# Patient Record
Sex: FEMALE | Race: WHITE | NOT HISPANIC OR LATINO | Employment: FULL TIME | URBAN - METROPOLITAN AREA
[De-identification: names, ages, dates, MRNs, and addresses within clinical notes are randomized per-mention and may not be internally consistent; named-entity substitution may affect disease eponyms.]

---

## 2017-01-16 ENCOUNTER — OFFICE VISIT (OUTPATIENT)
Dept: DERMATOLOGY | Facility: CLINIC | Age: 57
End: 2017-01-16
Payer: COMMERCIAL

## 2017-01-16 DIAGNOSIS — D22.9 MULTIPLE BENIGN NEVI: ICD-10-CM

## 2017-01-16 DIAGNOSIS — Z86.19 HISTORY OF ONYCHOMYCOSIS: ICD-10-CM

## 2017-01-16 DIAGNOSIS — L21.9 SEBORRHEIC DERMATITIS: ICD-10-CM

## 2017-01-16 DIAGNOSIS — Z12.83 SKIN CANCER SCREENING: Primary | ICD-10-CM

## 2017-01-16 PROCEDURE — 1159F MED LIST DOCD IN RCRD: CPT | Mod: S$GLB,,, | Performed by: DERMATOLOGY

## 2017-01-16 PROCEDURE — 99999 PR PBB SHADOW E&M-EST. PATIENT-LVL I: CPT | Mod: PBBFAC,,, | Performed by: DERMATOLOGY

## 2017-01-16 PROCEDURE — 99214 OFFICE O/P EST MOD 30 MIN: CPT | Mod: S$GLB,,, | Performed by: DERMATOLOGY

## 2017-01-16 RX ORDER — KETOCONAZOLE 20 MG/ML
SHAMPOO, SUSPENSION TOPICAL
Qty: 120 ML | Refills: 5 | Status: SHIPPED | OUTPATIENT
Start: 2017-01-16 | End: 2019-02-01

## 2017-01-16 NOTE — PROGRESS NOTES
Subjective:       Patient ID:  Brandie Richmond is a 56 y.o. female who presents for No chief complaint on file.    HPI Comments: Pt here for skin check. Last seen on 8-4-2016    Dry scaling skin on scalp for a few weeks since starting Prednisone, using [place] shampoo    Recent Pathology  FINAL PATHOLOGIC DIAGNOSIS   Skin, left neck, shave biopsy:   - ACROCHORDON (FIBROEPITHELIAL POLYP).   MICROSCOPIC DESCRIPTION: Sections show a fibrovascular core covered by squamous epithelium of the   surface epidermal type.   Diagnosed by: Chandrika Johnson M.D.   (Electronically Signed: 2016-08-08 13:40:08)   Gross Description   Clinic # [id number]   Received in formalin, labeled left neck, is a 0.4 cm shave biopsy nonpigmented skin which has a 0.3 x 0.3 cm   pedunculated skin papule. Specimen is bisected and entirely submitted in a single cassette.   Marco Antonio Atkinson      Onychomycosis  negative fungal culture in past  Dc'dtavaborole (KERYDIN) 5 % Marina July 2016 - toe nails have improved      No phx skin ca  Father had skin ca - type unknown                   Review of Systems   Skin: Positive for activity-related sunscreen use. Negative for daily sunscreen use and recent sunburn.        Objective:    Physical Exam   Constitutional: She appears well-developed and well-nourished. No distress.   HENT:   Head:       Mouth/Throat: Lips normal.    Eyes: Lids are normal.  No conjunctival no injection.   Cardiovascular: There is no local extremity swelling and no dependent edema.     Neurological: She is alert and oriented to person, place, and time. She is not disoriented.   Psychiatric: She has a normal mood and affect.   Skin:   Areas Examined (abnormalities noted in diagram):   Scalp / Hair Palpated and Inspected  Head / Face Inspection Performed  Neck Inspection Performed  Chest / Axilla Inspection Performed  Abdomen Inspection Performed  Genitals / Buttocks / Groin Inspection Performed  Back Inspection Performed  RUE Inspected  ROBERT  Inspection Performed  RLE Inspected  LLE Inspection Performed  Nails and Digits Inspection Performed              Diagram Legend     Erythematous scaling macule/papule c/w actinic keratosis       Vascular papule c/w angioma      Pigmented verrucoid papule/plaque c/w seborrheic keratosis      Yellow umbilicated papule c/w sebaceous hyperplasia      Irregularly shaped tan macule c/w lentigo     1-2 mm smooth white papules consistent with Milia      Movable subcutaneous cyst with punctum c/w epidermal inclusion cyst      Subcutaneous movable cyst c/w pilar cyst      Firm pink to brown papule c/w dermatofibroma      Pedunculated fleshy papule(s) c/w skin tag(s)      Evenly pigmented macule c/w junctional nevus     Mildly variegated pigmented, slightly irregular-bordered macule c/w mildly atypical nevus      Flesh colored to evenly pigmented papule c/w intradermal nevus       Pink pearly papule/plaque c/w basal cell carcinoma      Erythematous hyperkeratotic cursted plaque c/w SCC      Surgical scar with no sign of skin cancer recurrence      Open and closed comedones      Inflammatory papules and pustules      Verrucoid papule consistent consistent with wart     Erythematous eczematous patches and plaques     Dystrophic onycholytic nail with subungual debris c/w onychomycosis     Umbilicated papule    Erythematous-base heme-crusted tan verrucoid plaque consistent with inflamed seborrheic keratosis     Erythematous Silvery Scaling Plaque c/w Psoriasis     See annotation      Assessment / Plan:        Skin cancer screening    Total body skin examination performed today including at least 12 points as noted in physical examination. No lesions suspicious for malignancy noted.      Seborrheic dermatitis  -     ketoconazole (NIZORAL) 2 % shampoo; Wash hair with medicated shampoo at least 2x/week - let sit on scalp at least 5 minutes prior to rinsing  Dispense: 120 mL; Refill: 5    Multiple benign nevi  Discussed ABCDE's of  nevi.  Monitor for new mole or moles that are becoming bigger, darker, irritated, or developing irregular borders.       History of onychomycosis  Resolved, reassurance            Return if symptoms worsen or fail to improve.

## 2017-02-21 ENCOUNTER — OFFICE VISIT (OUTPATIENT)
Dept: CARDIOLOGY | Facility: CLINIC | Age: 57
End: 2017-02-21
Payer: COMMERCIAL

## 2017-02-21 VITALS
BODY MASS INDEX: 29.65 KG/M2 | HEART RATE: 82 BPM | SYSTOLIC BLOOD PRESSURE: 168 MMHG | DIASTOLIC BLOOD PRESSURE: 80 MMHG | WEIGHT: 184.5 LBS | HEIGHT: 66 IN

## 2017-02-21 DIAGNOSIS — I10 ESSENTIAL HYPERTENSION: ICD-10-CM

## 2017-02-21 DIAGNOSIS — Z82.49 FAMILY HISTORY OF CARDIOVASCULAR DISEASE: Chronic | ICD-10-CM

## 2017-02-21 DIAGNOSIS — M35.3 PMR (POLYMYALGIA RHEUMATICA): ICD-10-CM

## 2017-02-21 DIAGNOSIS — Z82.49 FAMILY HISTORY OF EARLY CAD: Primary | ICD-10-CM

## 2017-02-21 PROCEDURE — 99214 OFFICE O/P EST MOD 30 MIN: CPT | Mod: S$GLB,,, | Performed by: INTERNAL MEDICINE

## 2017-02-21 PROCEDURE — 3079F DIAST BP 80-89 MM HG: CPT | Mod: S$GLB,,, | Performed by: INTERNAL MEDICINE

## 2017-02-21 PROCEDURE — 3077F SYST BP >= 140 MM HG: CPT | Mod: S$GLB,,, | Performed by: INTERNAL MEDICINE

## 2017-02-21 PROCEDURE — 99999 PR PBB SHADOW E&M-EST. PATIENT-LVL II: CPT | Mod: PBBFAC,,, | Performed by: INTERNAL MEDICINE

## 2017-02-21 PROCEDURE — 1160F RVW MEDS BY RX/DR IN RCRD: CPT | Mod: S$GLB,,, | Performed by: INTERNAL MEDICINE

## 2017-02-21 RX ORDER — PREDNISONE 1 MG/1
4 TABLET ORAL DAILY
Refills: 2 | COMMUNITY
Start: 2017-02-10 | End: 2017-02-21

## 2017-02-21 RX ORDER — METHOTREXATE 2.5 MG/1
TABLET ORAL
Refills: 3 | COMMUNITY
Start: 2017-01-19 | End: 2021-07-02

## 2017-02-21 RX ORDER — CLONIDINE HYDROCHLORIDE 0.1 MG/1
0.1 TABLET ORAL 3 TIMES DAILY PRN
Qty: 90 TABLET | Refills: 6 | Status: SHIPPED | OUTPATIENT
Start: 2017-02-21 | End: 2021-11-08 | Stop reason: SDUPTHER

## 2017-02-21 RX ORDER — FOLIC ACID 1 MG/1
TABLET ORAL
Refills: 3 | COMMUNITY
Start: 2017-01-29 | End: 2021-07-02

## 2017-02-21 RX ORDER — DILTIAZEM HYDROCHLORIDE 120 MG/1
120 CAPSULE, EXTENDED RELEASE ORAL NIGHTLY
Qty: 90 CAPSULE | Refills: 6 | Status: SHIPPED | OUTPATIENT
Start: 2017-02-21 | End: 2017-03-30 | Stop reason: DRUGHIGH

## 2017-02-21 NOTE — MR AVS SNAPSHOT
[place] [facility]  [address]  [place] [address]  Phone: [phone]                  Brandie Richmond   2017 3:00 PM   Office Visit    Description:  Female : 1960   Provider:  David Meek MD   Department:  [place] [facility]           Reason for Visit     Hypertension     Shortness of Breath           Diagnoses this Visit        Comments    Family history of early CAD    -  Primary     Family history of cardiovascular disease         Essential hypertension         PMR (polymyalgia rheumatica)                To Do List           Future Appointments        Provider Department Dept Phone    2017 8:40 AM Judy Arrieta MD [place] [facility] [phone]    2017 8:00 AM Tuyet Gallo MD [facility] - [phone]      Goals (5 Years of Data)     None      Follow-Up and Disposition     Return in about 6 months (around 2017).       These Medications        Disp Refills Start End    diltiaZEM (DILACOR XR) 120 MG CDCR 90 capsule 6 2017    Take 1 capsule (120 mg total) by mouth every evening. - Oral    Pharmacy: [facility]/pharmacy #[address] [place] [address] - [id number] [facility] Ph #: [phone]       cloNIDine (CATAPRES) 0.1 MG tablet 90 tablet 6 2017    Take 1 tablet (0.1 mg total) by mouth 3 (three) times daily as needed (PRN SBP > 165 mmHg). - Oral    Pharmacy: [facility]/pharmacy #[address] [place] [address] - [id number] [facility] Ph #: [phone]         [facility]     [facility] On Call Nurse Care Line -  Assistance  Registered nurses in the [facility] provide clinical advisement, health education, appointment booking, and other advisory services.  Call for this free service at [phone].             Medications           Message regarding Medications     Verify the changes and/or additions to your medication regime listed below are the same as  discussed with your clinician today.  If any of these changes or additions are incorrect, please notify your healthcare provider.        START taking these NEW medications        Refills    diltiaZEM (DILACOR XR) 120 MG CDCR 6    Sig: Take 1 capsule (120 mg total) by mouth every evening.    Class: Normal    Route: Oral    cloNIDine (CATAPRES) 0.1 MG tablet 6    Sig: Take 1 tablet (0.1 mg total) by mouth 3 (three) times daily as needed (PRN SBP > 165 mmHg).    Class: Normal    Route: Oral      STOP taking these medications     metoprolol succinate (TOPROL-XL) 25 MG 24 hr tablet Take 1 tablet (25 mg total) by mouth once daily.           Verify that the below list of medications is an accurate representation of the medications you are currently taking.  If none reported, the list may be blank. If incorrect, please contact your healthcare provider. Carry this list with you in case of emergency.           Current Medications     folic acid (FOLVITE) 1 MG tablet 1 TABLET BY MOUTH ONCE A DAY.    hydrochlorothiazide (MICROZIDE) 12.5 mg capsule Take 1 capsule (12.5 mg total) by mouth once daily.    ketoconazole (NIZORAL) 2 % shampoo Wash hair with medicated shampoo at least 2x/week - let sit on scalp at least 5 minutes prior to rinsing    methotrexate 10 MG tablet Take 10 mg by mouth once a week.    methotrexate 2.5 MG Tab 4 TABS BY MOUTH ONCE WEEKLY ALL AT THE SAME TIME.    nystatin (MYCOSTATIN) cream Apply topically 2 (two) times daily.    predniSONE (DELTASONE) 10 MG tablet Take 15 mg by mouth once daily.    SYNTHROID 50 mcg tablet TAKE 1 TABLET BY MOUTH EVERY MONDAY, TUESDAY, THURSDAY, FRIDAY, SUNDAY    SYNTHROID 75 mcg tablet TAKE 1 TABLET BY MOUTH TWICE A WEEK.    cloNIDine (CATAPRES) 0.1 MG tablet Take 1 tablet (0.1 mg total) by mouth 3 (three) times daily as needed (PRN SBP > 165 mmHg).    diltiaZEM (DILACOR XR) 120 MG CDCR Take 1 capsule (120 mg total) by mouth every evening.           Clinical Reference Information  "          Your Vitals Were     BP Pulse Height Weight BMI    168/80 82 5' 6" (1.676 m) 83.7 kg (184 lb 8.4 oz) 29.78 kg/m2      Blood Pressure          Most Recent Value    BP  (!)  168/80      Allergies as of 2/21/2017     Iodine      Immunizations Administered on Date of Encounter - 2/21/2017     None      Language Assistance Services     ATTENTION: Language assistance services are available, free of charge. Please call [phone].      ATENCIÓN: Si habla rani, tiene a pickett disposición servicios gratuitos de asistencia lingüística. Llame al [phone].     CHÚ Ý: N?u b?n nói Ti?ng Vi?t, có các d?ch v? h? tr? ngôn ng? mi?n phí dành cho b?n. G?i s? [phone].         [facility] complies with applicable Federal civil rights laws and does not discriminate on the basis of race, color, national origin, age, disability, or sex.        "

## 2017-02-21 NOTE — PROGRESS NOTES
Subjective:    Patient ID:  Brandie Richmond is a 56 y.o. female who presents for follow-up of Hypertension (established pt) and Shortness of Breath      HPI   Here for f/u HTN/fam HX. Dx with PMR. BP elevating since then has spikes. Was in ED 1 x for . No CP or SOB. No raynauds. Patients states is doing well no chest pain, SOB or change in exertional tolerence.       Review of Systems   Constitution: Negative for chills, decreased appetite, weakness and night sweats.   HENT: Negative for headaches and nosebleeds.    Eyes: Negative for blurred vision and visual disturbance.   Cardiovascular: Negative for chest pain, claudication, cyanosis, dyspnea on exertion, irregular heartbeat, leg swelling, near-syncope, orthopnea, palpitations, paroxysmal nocturnal dyspnea and syncope.   Respiratory: Negative for cough and shortness of breath.    Endocrine: Negative for polyuria.   Hematologic/Lymphatic: Does not bruise/bleed easily.   Skin: Negative for flushing and rash.   Musculoskeletal: Negative for arthritis, joint pain, muscle cramps and myalgias.   Gastrointestinal: Negative for abdominal pain, change in bowel habit and heartburn.   Genitourinary: Negative for bladder incontinence.   Neurological: Negative for dizziness, light-headedness and loss of balance.   Psychiatric/Behavioral: Negative for altered mental status.        Objective:    Physical Exam   Constitutional: She is oriented to person, place, and time. She appears well-developed and well-nourished.   HENT:   Head: Normocephalic.   Eyes: Conjunctivae are normal.   Neck: Normal range of motion. Neck supple. No JVD present.   Cardiovascular: Normal rate, regular rhythm, normal heart sounds and intact distal pulses.    Pulses:       Carotid pulses are 2+ on the right side, and 2+ on the left side.       Radial pulses are 2+ on the right side, and 2+ on the left side.        Dorsalis pedis pulses are 2+ on the right side, and 2+ on the left side.         Posterior tibial pulses are 2+ on the right side, and 2+ on the left side.   Pulmonary/Chest: Effort normal and breath sounds normal.   Abdominal: Soft. Bowel sounds are normal.   Musculoskeletal: She exhibits no edema or tenderness.   Neurological: She is alert and oriented to person, place, and time. Gait normal.   Skin: Skin is warm, dry and intact. No cyanosis. Nails show no clubbing.   Psychiatric: She has a normal mood and affect. Her speech is normal and behavior is normal. Thought content normal.   Nursing note and vitals reviewed.            ..    Chemistry        Component Value Date/Time     06/28/2016 1419    K 3.5 06/28/2016 1419     06/28/2016 1419    CO2 28 06/28/2016 1419    BUN 21 (H) 06/28/2016 1419    CREATININE 0.9 06/28/2016 1419     06/28/2016 1419        Component Value Date/Time    CALCIUM 9.5 06/28/2016 1419    ALKPHOS 84 06/28/2016 1419    AST 16 06/28/2016 1419    ALT 18 06/28/2016 1419    BILITOT 0.3 06/28/2016 1419            ..  Lab Results   Component Value Date    CHOL 205 (H) 06/17/2016    CHOL 186 09/13/2014    CHOL 200 (H) 12/23/2013     Lab Results   Component Value Date    HDL 58 06/17/2016    HDL 62 09/13/2014    HDL 58 12/23/2013     Lab Results   Component Value Date    LDLCALC 124.0 06/17/2016    LDLCALC 102.4 09/13/2014    LDLCALC 116.4 12/23/2013     Lab Results   Component Value Date    TRIG 115 06/17/2016    TRIG 108 09/13/2014    TRIG 128 12/23/2013     Lab Results   Component Value Date    CHOLHDL 28.3 06/17/2016    CHOLHDL 33.3 09/13/2014    CHOLHDL 29.0 12/23/2013     ..  Lab Results   Component Value Date    WBC 10.45 06/28/2016    HGB 14.6 06/28/2016    HCT 42.7 06/28/2016    MCV 92 06/28/2016     06/28/2016       Test(s) Reviewed  I have reviewed the following in detail:  [] Stress test   [] Angiography   [x] Echocardiogram   [x] Labs   [] Other:       Assessment:         ICD-10-CM ICD-9-CM   1. Family history of early CAD Z82.49 V17.3    2. Family history of cardiovascular disease Z82.49 V17.49   3. Essential hypertension I10 401.9     Problem List Items Addressed This Visit     Essential hypertension    Family history of cardiovascular disease (Chronic)      Other Visit Diagnoses     Family history of early CAD    -  Primary           Plan:           Return to clinic 6 months   Aerobic exercise 5x's/wk. Heart healthy diet and risk factor modification.    See labs and med orders.  Change to cardizem. Clonidine PRN

## 2017-03-22 ENCOUNTER — PATIENT MESSAGE (OUTPATIENT)
Dept: CARDIOLOGY | Facility: CLINIC | Age: 57
End: 2017-03-22

## 2017-03-23 ENCOUNTER — PATIENT MESSAGE (OUTPATIENT)
Dept: CARDIOLOGY | Facility: CLINIC | Age: 57
End: 2017-03-23

## 2017-03-30 RX ORDER — DILTIAZEM HYDROCHLORIDE 180 MG/1
180 CAPSULE, EXTENDED RELEASE ORAL DAILY
Qty: 90 CAPSULE | Refills: 3 | Status: SHIPPED | OUTPATIENT
Start: 2017-03-30 | End: 2017-05-30

## 2017-04-06 ENCOUNTER — OFFICE VISIT (OUTPATIENT)
Dept: FAMILY MEDICINE | Facility: CLINIC | Age: 57
End: 2017-04-06
Payer: COMMERCIAL

## 2017-04-06 VITALS
SYSTOLIC BLOOD PRESSURE: 122 MMHG | BODY MASS INDEX: 29.72 KG/M2 | WEIGHT: 184.94 LBS | HEART RATE: 63 BPM | HEIGHT: 66 IN | DIASTOLIC BLOOD PRESSURE: 93 MMHG

## 2017-04-06 DIAGNOSIS — J06.9 VIRAL UPPER RESPIRATORY TRACT INFECTION: Primary | ICD-10-CM

## 2017-04-06 DIAGNOSIS — I10 ESSENTIAL HYPERTENSION: ICD-10-CM

## 2017-04-06 DIAGNOSIS — J30.1 SEASONAL ALLERGIC RHINITIS DUE TO POLLEN: ICD-10-CM

## 2017-04-06 PROCEDURE — 99213 OFFICE O/P EST LOW 20 MIN: CPT | Mod: S$GLB,,, | Performed by: PHYSICIAN ASSISTANT

## 2017-04-06 PROCEDURE — 1160F RVW MEDS BY RX/DR IN RCRD: CPT | Mod: S$GLB,,, | Performed by: PHYSICIAN ASSISTANT

## 2017-04-06 PROCEDURE — 3078F DIAST BP <80 MM HG: CPT | Mod: S$GLB,,, | Performed by: PHYSICIAN ASSISTANT

## 2017-04-06 PROCEDURE — 99999 PR PBB SHADOW E&M-EST. PATIENT-LVL IV: CPT | Mod: PBBFAC,,, | Performed by: PHYSICIAN ASSISTANT

## 2017-04-06 PROCEDURE — 3074F SYST BP LT 130 MM HG: CPT | Mod: S$GLB,,, | Performed by: PHYSICIAN ASSISTANT

## 2017-04-06 RX ORDER — FERROUS SULFATE, DRIED 160(50) MG
1 TABLET, EXTENDED RELEASE ORAL 2 TIMES DAILY WITH MEALS
COMMUNITY
End: 2019-02-01

## 2017-04-06 RX ORDER — FLUTICASONE PROPIONATE 50 MCG
2 SPRAY, SUSPENSION (ML) NASAL DAILY
Qty: 16 G | Refills: 12 | Status: SHIPPED | OUTPATIENT
Start: 2017-04-06 | End: 2018-04-06

## 2017-04-06 NOTE — PROGRESS NOTES
Subjective:       Patient ID: Brandie Richmond is a 56 y.o. female.    Chief Complaint: URI    HPI   Cough and congestion x 5 days  No fever  No colored mucus  BP is bouncing about a lot  Dr Meek recent DC'd metoprolol   Review of Systems   Constitutional: Negative.  Negative for activity change, appetite change, chills, diaphoresis, fatigue, fever and unexpected weight change.   HENT: Positive for congestion. Negative for postnasal drip, sinus pressure and sore throat.    Eyes: Negative.    Respiratory: Positive for cough. Negative for shortness of breath and wheezing.    Cardiovascular: Negative.  Negative for chest pain and leg swelling.   Gastrointestinal: Negative.    Endocrine: Negative.    Genitourinary: Negative.    Musculoskeletal: Negative.    Skin: Negative.  Negative for rash.   Neurological: Negative.        Objective:      Physical Exam   Constitutional: She appears well-developed and well-nourished. No distress.   HENT:   Head: Normocephalic and atraumatic.   Right Ear: External ear normal.   Left Ear: External ear normal.   Nose: Nose normal.   Mouth/Throat: Oropharynx is clear and moist. No oropharyngeal exudate.   Sinuses non tender  Mucus clear  Boggy pale 3+ turbinates bilat   Eyes: Conjunctivae are normal. No scleral icterus.   Neck: Normal range of motion. Neck supple. No tracheal deviation present. No thyromegaly present.   Cardiovascular: Normal rate, regular rhythm, normal heart sounds and intact distal pulses.  Exam reveals no gallop and no friction rub.    No murmur heard.  Pulmonary/Chest: Effort normal and breath sounds normal. No respiratory distress. She has no wheezes. She has no rales.   Musculoskeletal: She exhibits no edema.   Lymphadenopathy:     She has no cervical adenopathy.   Skin: Skin is warm and dry. No rash noted.   Vitals reviewed.      Assessment:       1. Viral upper respiratory tract infection    2. Seasonal allergic rhinitis due to pollen    3. Essential hypertension         Plan:       Brandie was seen today for uri.    Diagnoses and all orders for this visit:    Viral upper respiratory tract infection    Seasonal allergic rhinitis due to pollen  -     fluticasone (FLONASE) 50 mcg/actuation nasal spray; 2 sprays by Each Nare route once daily.    Essential hypertension  -     Cancel: Comprehensive metabolic panel; Future      Discussed otc's

## 2017-05-30 ENCOUNTER — TELEPHONE (OUTPATIENT)
Dept: FAMILY MEDICINE | Facility: CLINIC | Age: 57
End: 2017-05-30

## 2017-05-30 ENCOUNTER — OFFICE VISIT (OUTPATIENT)
Dept: INTERNAL MEDICINE | Facility: CLINIC | Age: 57
End: 2017-05-30
Payer: COMMERCIAL

## 2017-05-30 VITALS
RESPIRATION RATE: 16 BRPM | HEART RATE: 74 BPM | WEIGHT: 187.38 LBS | BODY MASS INDEX: 30.11 KG/M2 | DIASTOLIC BLOOD PRESSURE: 84 MMHG | OXYGEN SATURATION: 98 % | SYSTOLIC BLOOD PRESSURE: 138 MMHG | HEIGHT: 66 IN

## 2017-05-30 DIAGNOSIS — M79.673 PAIN OF FOOT, UNSPECIFIED LATERALITY: ICD-10-CM

## 2017-05-30 DIAGNOSIS — Z51.81 MEDICATION MONITORING ENCOUNTER: ICD-10-CM

## 2017-05-30 DIAGNOSIS — I10 ESSENTIAL HYPERTENSION: Primary | ICD-10-CM

## 2017-05-30 DIAGNOSIS — R09.A2 SENSATION OF LUMP IN THROAT: ICD-10-CM

## 2017-05-30 DIAGNOSIS — R10.10 PAIN OF UPPER ABDOMEN: ICD-10-CM

## 2017-05-30 PROCEDURE — 99999 PR PBB SHADOW E&M-EST. PATIENT-LVL IV: CPT | Mod: PBBFAC,,, | Performed by: INTERNAL MEDICINE

## 2017-05-30 PROCEDURE — 99214 OFFICE O/P EST MOD 30 MIN: CPT | Mod: S$GLB,,, | Performed by: INTERNAL MEDICINE

## 2017-05-30 RX ORDER — PREDNISONE 5 MG/1
10 TABLET ORAL DAILY
Refills: 3 | COMMUNITY
Start: 2017-05-23 | End: 2017-05-30

## 2017-05-30 RX ORDER — DILTIAZEM HYDROCHLORIDE 120 MG/1
1 CAPSULE, EXTENDED RELEASE ORAL DAILY
Qty: 30 CAPSULE | Refills: 1 | Status: SHIPPED | OUTPATIENT
Start: 2017-05-30 | End: 2017-06-29 | Stop reason: SDUPTHER

## 2017-05-30 NOTE — TELEPHONE ENCOUNTER
Per Dr Arrieta:    Pt. Stopped diltiazem, looks like B/Ps ok, is now on HCTZ only.  Was there a specific reason you would like her to continue with a CCB?      Please advise and I'll msg Dr Arrieta back. thx

## 2017-05-30 NOTE — PROGRESS NOTES
HISTORY OF PRESENT ILLNESS:  Pt. is a 56 y.o. female presents for monitoring of her HTN, hypothyroidism, PMR/chronic use of steroids.  HTN is in control on current med.  She is currently seeing Dr. Adam Gonzalez, now [place] of rheumatology, is weaning off of prednisone, now at 14 mg, is on methotrexate, doing better.  TSH is WNL on current med.  Mammogram and Pap performed 11/16/16.  Colonoscopy done 4/29/11.  She is UTD with tetanus vaccine.  She had seen Dr. Meek in the last several months for FMHx of early CAD and HTN, was changed to diltaizem and clonidine prn, but pt. States her B/P is fluctuating a lot.  She had cough, was put in folnase.  She states she stopped the cardizem, is only on HCTZ.  In the AMs: 135-145/78-85.  Messaged Dr. Meek concening med changes.  She feels a flushing.  She has had a sinusitis with yellow/green/ blood.  Today just a little specks of blood.  States in 4 months, feels like she may have mass in  Her throat.    Lab Results   Component Value Date    WBC 10.45 06/28/2016    HGB 14.6 06/28/2016    HCT 42.7 06/28/2016     06/28/2016    CHOL 205 (H) 06/17/2016    TRIG 115 06/17/2016    HDL 58 06/17/2016    ALT 18 06/28/2016    AST 16 06/28/2016     06/28/2016    K 3.5 06/28/2016     06/28/2016    CREATININE 0.9 06/28/2016    BUN 21 (H) 06/28/2016    CO2 28 06/28/2016    TSH 1.212 11/02/2016    INR 1.0 02/27/2012    HGBA1C 5.4 11/02/2016     Lab Results   Component Value Date    LDLCALC 124.0 06/17/2016             ROS:  GENERAL: No fever, chills, fatigability; no weight loss.  SKIN: No rashes, itching or changes in color or texture of skin; gets skin checks;  HEAD: Occ headaches/feeling of flushing; no recent head trauma.  EARS: Denies ear pain, discharge or vertigo.  NOSE: No loss of smell, occ epistaxis; positive postnasal drip.  MOUTH & THROAT: No hoarseness or change in voice. No excessive gum bleeding.  NODES: Denies swollen glands.  CHEST: Denies JUARES, cyanosis,  wheezing, cough and sputum production.  CARDIOVASCULAR: Occ chest pain, no PND, orthopnea or reduced exercise tolerance.  ABDOMEN: Appetite fine. No weight loss. Denies constipation, diarrhea, abdominal pain, hematemesis or blood in stool.  URINARY: No flank pain, dysuria or hematuria.  PERIPHERAL VASCULAR: No claudication or cyanosis. No edema.  MUSCULOSKELETAL: No joint stiffness or swelling. Denies back pain.  NEUROLOGIC: Denies numbness    PE:   Vitals:   Vitals:    05/30/17 0843   BP: 138/84   Pulse: 74   Resp: 16     GENERAL: no acute distress, A&Ox3, comfortable.  Female with BMI of 30   HEENT: tympanic membranes clear, nasal mucosa pink, no pharyngeal erythema or exudate  NECK: supple, no cervical lymphadenopathy, no thyromegaly; no supraclavicular nodes;   CHEST:  clear to auscultation bilaterally, no crackles or wheeze; no increased work of breathing;  CARDIOVASCULAR: regular rate and rhythm, no rubs, murmurs or gallops.  ABDOMEN: normal bowel sounds, soft positive pain on palpation of diaphragm, non-distended; no palpable organomegaly;   EXT: no clubbing, cyanosis or edema.     ASSESSMENT/PLAN:    Essential hypertension  -     Lipid panel; Future; Expected date: 05/30/2017    Medication monitoring encounter  -     Comprehensive metabolic panel; Future; Expected date: 05/30/2017  -     CBC auto differential; Future; Expected date: 05/30/2017    Sensation of lump in throat  -     US Soft Tissue Head Neck Thyroid; Future; Expected date: 05/30/2017    Pain of foot, unspecified laterality  -     Ambulatory referral to Podiatry    Pain of upper abdomen  -     US Abdomen Complete; Future; Expected date: 05/30/2017        Call if condition changes or worsens.

## 2017-05-30 NOTE — TELEPHONE ENCOUNTER
Pt. Stopped diltiazem, looks like B/Ps ok, is now on HCTZ only.  Was there a specific reason you would like her to continue with a CCB?

## 2017-05-30 NOTE — TELEPHONE ENCOUNTER
Notify pt. That I send a lower dose of cardizem [facility].  Dr. Meek would rather she take that that the HCTZ.  Have her stop the HCTZ and monitor B/P.  RTC in 3 weeks for nurse visit with B/P check.

## 2017-05-30 NOTE — TELEPHONE ENCOUNTER
Spoke to pt, advised pt of message below, pt verbalized understanding, pt scheduled for nurse visit on 6/21.

## 2017-06-01 PROBLEM — R09.A2 SENSATION OF LUMP IN THROAT: Status: ACTIVE | Noted: 2017-06-01

## 2017-06-01 PROBLEM — M79.673 PAIN OF FOOT: Status: ACTIVE | Noted: 2017-06-01

## 2017-06-12 ENCOUNTER — HOSPITAL ENCOUNTER (OUTPATIENT)
Dept: RADIOLOGY | Facility: HOSPITAL | Age: 57
Discharge: HOME | End: 2017-06-12
Attending: INTERNAL MEDICINE
Payer: COMMERCIAL

## 2017-06-12 DIAGNOSIS — R10.10 PAIN OF UPPER ABDOMEN: ICD-10-CM

## 2017-06-12 DIAGNOSIS — R09.A2 SENSATION OF LUMP IN THROAT: ICD-10-CM

## 2017-06-12 PROCEDURE — 76536 US EXAM OF HEAD AND NECK: CPT | Mod: 26,,, | Performed by: RADIOLOGY

## 2017-06-12 PROCEDURE — 76700 US EXAM ABDOM COMPLETE: CPT | Mod: 26,,, | Performed by: RADIOLOGY

## 2017-06-12 PROCEDURE — 76700 US EXAM ABDOM COMPLETE: CPT | Mod: TC,PO

## 2017-06-12 PROCEDURE — 76536 US EXAM OF HEAD AND NECK: CPT | Mod: TC,PO

## 2017-06-16 ENCOUNTER — TELEPHONE (OUTPATIENT)
Dept: FAMILY MEDICINE | Facility: CLINIC | Age: 57
End: 2017-06-16

## 2017-06-16 DIAGNOSIS — E87.0 HYPERNATREMIA: Primary | ICD-10-CM

## 2017-06-16 NOTE — TELEPHONE ENCOUNTER
The patient has a upcomming Nurse visit, she wanted to know if the patient there are any labs to do? Please advise TY

## 2017-06-16 NOTE — TELEPHONE ENCOUNTER
----- Message from Oneil Hernandez sent at 6/16/2017  1:15 PM CDT -----  Contact: Brandie  patient said she was advised she needs orders put in for a recheck. She is thinking she may need to change the nurse visit to the same day she gets her next labs done. She was not told when they need to be done. Please call back [phone] (home)   Thanks!

## 2017-06-29 ENCOUNTER — TELEPHONE (OUTPATIENT)
Dept: FAMILY MEDICINE | Facility: CLINIC | Age: 57
End: 2017-06-29

## 2017-06-29 ENCOUNTER — CLINICAL SUPPORT (OUTPATIENT)
Dept: FAMILY MEDICINE | Facility: CLINIC | Age: 57
End: 2017-06-29
Payer: COMMERCIAL

## 2017-06-29 ENCOUNTER — LAB VISIT (OUTPATIENT)
Dept: LAB | Facility: HOSPITAL | Age: 57
End: 2017-06-29
Attending: INTERNAL MEDICINE
Payer: COMMERCIAL

## 2017-06-29 VITALS — SYSTOLIC BLOOD PRESSURE: 128 MMHG | DIASTOLIC BLOOD PRESSURE: 80 MMHG

## 2017-06-29 DIAGNOSIS — Z01.30 BP CHECK: Primary | ICD-10-CM

## 2017-06-29 DIAGNOSIS — E87.0 HYPERNATREMIA: ICD-10-CM

## 2017-06-29 LAB
ANION GAP SERPL CALC-SCNC: 7 MMOL/L
BUN SERPL-MCNC: 21 MG/DL
CALCIUM SERPL-MCNC: 9.1 MG/DL
CHLORIDE SERPL-SCNC: 106 MMOL/L
CO2 SERPL-SCNC: 30 MMOL/L
CREAT SERPL-MCNC: 0.9 MG/DL
EST. GFR  (AFRICAN AMERICAN): >60 ML/MIN/1.73 M^2
EST. GFR  (NON AFRICAN AMERICAN): >60 ML/MIN/1.73 M^2
GLUCOSE SERPL-MCNC: 76 MG/DL
POTASSIUM SERPL-SCNC: 3.7 MMOL/L
SODIUM SERPL-SCNC: 143 MMOL/L

## 2017-06-29 PROCEDURE — 80048 BASIC METABOLIC PNL TOTAL CA: CPT

## 2017-06-29 PROCEDURE — 99499 UNLISTED E&M SERVICE: CPT | Mod: S$GLB,,, | Performed by: INTERNAL MEDICINE

## 2017-06-29 PROCEDURE — 36415 COLL VENOUS BLD VENIPUNCTURE: CPT | Mod: PO

## 2017-06-29 RX ORDER — DILTIAZEM HYDROCHLORIDE 120 MG/1
CAPSULE, EXTENDED RELEASE ORAL
Qty: 60 CAPSULE | Refills: 5 | Status: SHIPPED | OUTPATIENT
Start: 2017-06-29 | End: 2017-07-23 | Stop reason: SDUPTHER

## 2017-06-29 RX ORDER — DILTIAZEM HYDROCHLORIDE 120 MG/1
1 CAPSULE, EXTENDED RELEASE ORAL DAILY
Qty: 60 CAPSULE | Refills: 5 | Status: SHIPPED | OUTPATIENT
Start: 2017-06-29 | End: 2017-06-29 | Stop reason: SDUPTHER

## 2017-06-29 NOTE — TELEPHONE ENCOUNTER
Spoke with patient, she states her BP in the mornings range in the 130-140/78, and the evening ranges from 140-160/80.  She said when she was taking the diltiazem 12hr she felt better, she said she would like to go back to this and take it twice a day instead of once.

## 2017-06-29 NOTE — TELEPHONE ENCOUNTER
Pt had bp check on today 120/80, pt stated bp in am are low, in the evenings it gets higher, would like advise on med. Please advise.

## 2017-07-23 RX ORDER — DILTIAZEM HYDROCHLORIDE 120 MG/1
1 CAPSULE, EXTENDED RELEASE ORAL DAILY
Qty: 30 CAPSULE | Refills: 9 | Status: SHIPPED | OUTPATIENT
Start: 2017-07-23 | End: 2017-08-25

## 2017-08-03 ENCOUNTER — HOSPITAL ENCOUNTER (EMERGENCY)
Facility: HOSPITAL | Age: 57
Discharge: HOME | End: 2017-08-03
Attending: EMERGENCY MEDICINE | Admitting: EMERGENCY MEDICINE
Payer: COMMERCIAL

## 2017-08-03 VITALS
HEIGHT: 67 IN | OXYGEN SATURATION: 100 % | BODY MASS INDEX: 29.03 KG/M2 | DIASTOLIC BLOOD PRESSURE: 77 MMHG | TEMPERATURE: 99 F | HEART RATE: 79 BPM | SYSTOLIC BLOOD PRESSURE: 174 MMHG | RESPIRATION RATE: 17 BRPM | WEIGHT: 185 LBS
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
## 2017-08-03 DIAGNOSIS — S83.92XA SPRAIN OF LEFT KNEE, UNSPECIFIED LIGAMENT, INITIAL ENCOUNTER: Primary | ICD-10-CM

## 2017-08-03 PROCEDURE — 99284 EMERGENCY DEPT VISIT MOD MDM: CPT

## 2017-08-03 PROCEDURE — 99282 EMERGENCY DEPT VISIT SF MDM: CPT | Mod: ,,, | Performed by: PHYSICIAN ASSISTANT

## 2017-08-03 NOTE — ED TRIAGE NOTES
Brandie Richmond, a 56 y.o. female presents to the ED fall while trying to get into car today and heard a pop. Bag got caught on door and she fell.  Denies LOC. Denies numbness or tibgling.       Chief Complaint   Patient presents with    Fall     Pt presented to the ED via EMS. Pt slipped in the parking lot. Pt c/o left knee pain. Pt alert.      Review of patient's allergies indicates:   Allergen Reactions    Iodine      Other reaction(s): Rash     Past Medical History:   Diagnosis Date    Abnormal Pap smear of cervix     ascus 2013    Dizziness     DJD (degenerative joint disease) of cervical spine     DJD (degenerative joint disease) of cervical spine     Hemorrhoids     HTN (hypertension)     Hypothyroidism     Polyarthritis rheumatica 2016     LOC: Patient name and date of birth verified. The patient is awake, alert and aware of environment with an appropriate affect, the patient is oriented x 3 and speaking appropriately.   APPEARANCE: Patient resting comfortably, patient is clean and well groomed, patient's clothing is properly fastened.  SKIN: The skin is warm and dry, color consistent with ethnicity, patient has normal skin turgor and moist mucus membranes, skin intact, no breakdown or bruising noted.  MUSCULOSKELETAL: Patient moving all extremities well, no obvious swelling or deformities noted. L knee pain 5/10 from fall  RESPIRATORY: Respirations are spontaneous, patient has a normal effort and rate, no accessory muscle use noted.  CARDIAC: Patient has a normal rate and rhythm, no periphreal edema noted, capillary refill < 3 seconds.  ABDOMEN: Soft and non tender to palpation, no distention noted. Bowel sounds present in all four quadrants.  NEUROLOGIC: Eyes open spontaneously, behavior appropriate to situation, follows commands, facial expression symmetrical, bilateral hand grasp equal and even, purposeful motor response noted, normal sensation in all extremities when touched with a  finger.

## 2017-08-03 NOTE — ED PROVIDER NOTES
Encounter Date: 8/3/2017    SCRIBE #1 NOTE: I, Reno Goldberg, am scribing for, and in the presence of,  Benoit Curry MD. I have scribed the following portions of the note - the APC attestation. Other sections scribed: Xray Reading.       History     Chief Complaint   Patient presents with    Fall     Pt presented to the ED via EMS. Pt slipped in the parking lot. Pt c/o left knee pain. Pt alert.      This is a 56 year old female with a PMH of HTN, hypothyroid, polyarthritis rheumatica on daily prednisone and MTX presents to the ED with a chief complaint of fall. Patient was exiting her vehicle in the rain this morning when the strap of her bag caught something, causing her to slip and fall back. She landed on her left hip. States she heard a pop in the left knee. Has pain and swelling to the left knee and lower leg. She was able to get back into the car and called EMS for transportation. She denies head injury, LOC, chest pain, SOB, neck or back pain, weakness, numbness/tingling. There are no open wounds.          Review of patient's allergies indicates:   Allergen Reactions    Iodine      Other reaction(s): Rash     Past Medical History:   Diagnosis Date    Abnormal Pap smear of cervix     ascus     Dizziness     DJD (degenerative joint disease) of cervical spine     DJD (degenerative joint disease) of cervical spine     Hemorrhoids     HTN (hypertension)     Hypothyroidism     Polyarthritis rheumatica      Past Surgical History:   Procedure Laterality Date    cervical fusion       SECTION      COLONOSCOPY      DEXA      ,     RHINOPLASTY TIP       Family History   Problem Relation Age of Onset    Heart failure Mother     Heart disease Mother     Osteoporosis Mother     Heart failure Father     Heart disease Father     Hypertension Brother     Diabetes Maternal Grandfather      Social History   Substance Use Topics    Smoking status: Never Smoker    Smokeless  tobacco: Never Used    Alcohol use 1.0 oz/week     2 Standard drinks or equivalent per week      Comment: several times a week     Review of Systems   Constitutional: Negative for chills and fever.   HENT: Negative for sore throat.    Respiratory: Negative for shortness of breath.    Cardiovascular: Negative for chest pain.   Gastrointestinal: Negative for nausea and vomiting.   Genitourinary: Negative for dysuria.   Musculoskeletal: Positive for arthralgias and joint swelling. Negative for back pain.   Skin: Negative for rash.   Neurological: Negative for weakness.   Hematological: Does not bruise/bleed easily.       Physical Exam     Initial Vitals [08/03/17 0900]   BP Pulse Resp Temp SpO2   (!) 174/77 79 17 98.6 °F (37 °C) 100 %      MAP       109.33         Physical Exam    Constitutional: She appears well-developed and well-nourished. No distress.   HENT:   Head: Atraumatic.   Eyes: Conjunctivae and EOM are normal. Pupils are equal, round, and reactive to light.   Neck: Normal range of motion. Neck supple.   Cardiovascular: Normal rate, regular rhythm and normal heart sounds.   Pulmonary/Chest: Breath sounds normal. No respiratory distress. She has no wheezes. She has no rhonchi. She has no rales.   Abdominal: Soft. Bowel sounds are normal. There is no tenderness. There is no rebound and no guarding.   Musculoskeletal:        Left knee: She exhibits swelling. She exhibits normal range of motion and normal alignment. Tenderness (diffuse) found.        Cervical back: Normal. She exhibits normal range of motion and no tenderness.        Lumbar back: Normal. She exhibits normal range of motion and no tenderness.   Exam limited by significant left knee effusion. No gross joint laxity appreciated. Negative anterior drawer test. DP pulses 2+, distal sensation intact. Skin is warm and dry without wounds or rashes.   Neurological: She is alert and oriented to person, place, and time.   Skin: Skin is warm and dry. No  rash noted.         ED Course   Procedures  Labs Reviewed - No data to display      Imaging Results          X-Ray Knee 3 View Left (Final result)  Result time 08/03/17 10:49:13    Final result by Javier Koch III, MD (08/03/17 10:49:13)                 Narrative:    3 views: No fracture dislocation bone destruction or OCD seen.  There is mild DJD.      Electronically signed by: JAVIER KOCH  Date:     08/03/17  Time:    10:49                              X-Ray Tibia Fibula 2 View Left (Final result)  Result time 08/03/17 10:49:25    Final result by Javier Koch III, MD (08/03/17 10:49:25)                 Narrative:    2 views: No fracture dislocation bone destruction seen.  There is mild DJD.      Electronically signed by: JAVIER KOCH  Date:     08/03/17  Time:    10:49                                 X-Rays:   Independently Interpreted Readings:   Other Readings:  X-Ray Knee 3 View Left: No evidence of fracture or dislocation  X-Ray Tibia Fibula 2 View Left: No evidence of fracture or dislocation          Medical Decision Making:   History:   Old Medical Records: I decided to obtain old medical records.  Independently Interpreted Test(s):   I have ordered and independently interpreted X-rays - see prior notes.  Clinical Tests:   Radiological Study: Ordered and Reviewed       APC / Resident Notes:   56 year old female presents with left knee pain s/p fall.  On exam the left knee is markedly swollen. She has FROM with some limitation secondary to swelling. The joint is warm with generalized tenderness. No overlying erythema. No joint laxity. She is neurovascularly intact.    DDX includes but is not limited to fracture, ligamentous injury, knee sprain, contusion. I considered but do not suspect DVT.    X-ray negative for fracture.  Rest, ice, elevation, compression, limited weight bearing with crutches.  Referral for outpatient f/u with Ortho. NSAIDs as directed for pain/swelling. Return to ED precautions  given. She is stable for discharge. I discussed the care of this patient with my supervising MD.          Deannaibe Attestation:   Scribe #1: I performed the above scribed service and the documentation accurately describes the services I performed. I attest to the accuracy of the note.    Attending Attestation:     Physician Attestation Statement for NP/PA:   I discussed this assessment and plan of this patient with the NP/PA, but I did not personally examine the patient. The face to face encounter was performed by the NP/PA.    Other NP/PA Attestation Additions:    History of Present Illness: 56 year old woman with Hx of chronic pain presents by EMS for evaluation of left knee pain after sustaining a fall from standing early this morning          Physician Attestation for Scribe:  Physician Attestation Statement for Scribe #1: I, Benoit Curry MD, reviewed documentation, as scribed by Reno Goldberg in my presence, and it is both accurate and complete.                 ED Course     Clinical Impression:   The primary encounter diagnosis was Sprain of left knee, unspecified ligament, initial encounter. A diagnosis of Fall was also pertinent to this visit.    Disposition:   Disposition: Discharged  Condition: Stable                        SUKHI Vaughn  08/03/17 0769

## 2017-08-08 DIAGNOSIS — E03.9 HYPOTHYROIDISM: ICD-10-CM

## 2017-08-14 RX ORDER — LEVOTHYROXINE SODIUM 50 UG/1
TABLET ORAL
Qty: 30 TABLET | Refills: 2 | Status: SHIPPED | OUTPATIENT
Start: 2017-08-14 | End: 2017-12-05 | Stop reason: SDUPTHER

## 2017-08-25 ENCOUNTER — OFFICE VISIT (OUTPATIENT)
Dept: CARDIOLOGY | Facility: CLINIC | Age: 57
End: 2017-08-25
Payer: COMMERCIAL

## 2017-08-25 VITALS
HEART RATE: 72 BPM | DIASTOLIC BLOOD PRESSURE: 68 MMHG | BODY MASS INDEX: 29.52 KG/M2 | WEIGHT: 188.06 LBS | SYSTOLIC BLOOD PRESSURE: 139 MMHG | HEIGHT: 67 IN

## 2017-08-25 DIAGNOSIS — I10 ESSENTIAL HYPERTENSION: Primary | ICD-10-CM

## 2017-08-25 DIAGNOSIS — Z82.49 FAMILY HISTORY OF CARDIOVASCULAR DISEASE: Chronic | ICD-10-CM

## 2017-08-25 DIAGNOSIS — M35.3 PMR (POLYMYALGIA RHEUMATICA): ICD-10-CM

## 2017-08-25 PROCEDURE — 3078F DIAST BP <80 MM HG: CPT | Mod: S$GLB,,, | Performed by: INTERNAL MEDICINE

## 2017-08-25 PROCEDURE — 99213 OFFICE O/P EST LOW 20 MIN: CPT | Mod: S$GLB,,, | Performed by: INTERNAL MEDICINE

## 2017-08-25 PROCEDURE — 3008F BODY MASS INDEX DOCD: CPT | Mod: S$GLB,,, | Performed by: INTERNAL MEDICINE

## 2017-08-25 PROCEDURE — 99999 PR PBB SHADOW E&M-EST. PATIENT-LVL III: CPT | Mod: PBBFAC,,, | Performed by: INTERNAL MEDICINE

## 2017-08-25 PROCEDURE — 3075F SYST BP GE 130 - 139MM HG: CPT | Mod: S$GLB,,, | Performed by: INTERNAL MEDICINE

## 2017-08-25 RX ORDER — DILTIAZEM HYDROCHLORIDE 180 MG/1
180 CAPSULE, COATED, EXTENDED RELEASE ORAL DAILY
Qty: 90 CAPSULE | Refills: 5 | Status: SHIPPED | OUTPATIENT
Start: 2017-08-25 | End: 2017-12-05 | Stop reason: SDUPTHER

## 2017-08-25 NOTE — PROGRESS NOTES
Subjective:    Patient ID:  Brandie Richmond is a 56 y.o. female who presents for follow-up of Hypertension (6 month f/u )      HPI   Here for f/u HTN/fam HX CAD/PMR. Doing well . Need left ACL repair. Patients states is doing well no chest pain, SOB or change in exertional tolerence.         Review of Systems   Constitution: Negative for malaise/fatigue.   Eyes: Negative for blurred vision.   Cardiovascular: Negative for chest pain, claudication, cyanosis, dyspnea on exertion, irregular heartbeat, leg swelling, near-syncope, orthopnea, palpitations, paroxysmal nocturnal dyspnea and syncope.   Respiratory: Negative for cough and shortness of breath.    Hematologic/Lymphatic: Does not bruise/bleed easily.   Musculoskeletal: Negative for back pain, falls, joint pain, muscle cramps, muscle weakness and myalgias.   Gastrointestinal: Negative for abdominal pain, change in bowel habit, nausea and vomiting.   Genitourinary: Negative for urgency.   Neurological: Negative for dizziness, focal weakness and light-headedness.        Objective:    Physical Exam   Constitutional: She is oriented to person, place, and time. She appears well-developed and well-nourished.   HENT:   Head: Normocephalic.   Eyes: Conjunctivae are normal.   Neck: Normal range of motion. Neck supple. No JVD present.   Cardiovascular: Normal rate, regular rhythm, normal heart sounds and intact distal pulses.    Pulses:       Carotid pulses are 2+ on the right side, and 2+ on the left side.       Radial pulses are 2+ on the right side, and 2+ on the left side.        Dorsalis pedis pulses are 2+ on the right side, and 2+ on the left side.        Posterior tibial pulses are 2+ on the right side, and 2+ on the left side.   Pulmonary/Chest: Effort normal and breath sounds normal.   Abdominal: Soft. Bowel sounds are normal.   Musculoskeletal: She exhibits no edema or tenderness.   Neurological: She is alert and oriented to person, place, and time. Gait normal.    Skin: Skin is warm, dry and intact. No cyanosis. Nails show no clubbing.   Psychiatric: She has a normal mood and affect. Her speech is normal and behavior is normal. Thought content normal.   Nursing note and vitals reviewed.            ..    Chemistry        Component Value Date/Time     06/29/2017 0806    K 3.7 06/29/2017 0806     06/29/2017 0806    CO2 30 (H) 06/29/2017 0806    BUN 21 (H) 06/29/2017 0806    CREATININE 0.9 06/29/2017 0806    GLU 76 06/29/2017 0806        Component Value Date/Time    CALCIUM 9.1 06/29/2017 0806    ALKPHOS 58 06/12/2017 0758    AST 16 06/12/2017 0758    ALT 14 06/12/2017 0758    BILITOT 0.3 06/12/2017 0758    ESTGFRAFRICA >60.0 06/29/2017 0806    EGFRNONAA >60.0 06/29/2017 0806            ..  Lab Results   Component Value Date    CHOL 171 06/12/2017    CHOL 205 (H) 06/17/2016    CHOL 186 09/13/2014     Lab Results   Component Value Date    HDL 61 06/12/2017    HDL 58 06/17/2016    HDL 62 09/13/2014     Lab Results   Component Value Date    LDLCALC 58.4 (L) 06/12/2017    LDLCALC 124.0 06/17/2016    LDLCALC 102.4 09/13/2014     Lab Results   Component Value Date    TRIG 258 (H) 06/12/2017    TRIG 115 06/17/2016    TRIG 108 09/13/2014     Lab Results   Component Value Date    CHOLHDL 35.7 06/12/2017    CHOLHDL 28.3 06/17/2016    CHOLHDL 33.3 09/13/2014     ..  Lab Results   Component Value Date    WBC 8.66 06/12/2017    HGB 13.1 06/12/2017    HCT 40.7 06/12/2017    MCV 97 06/12/2017     06/12/2017       Test(s) Reviewed  I have reviewed the following in detail:  [] Stress test   [] Angiography   [] Echocardiogram   [x] Labs   [x] Other:       Assessment:         ICD-10-CM ICD-9-CM   1. Essential hypertension I10 401.9   2. Family history of cardiovascular disease Z82.49 V17.49   3. PMR (polymyalgia rheumatica) M35.3 725     Problem List Items Addressed This Visit     Essential hypertension - Primary    Family history of cardiovascular disease (Chronic)    PMR  (polymyalgia rheumatica)      Other Visit Diagnoses    None.          Plan:           Return to clinic 9 months   Aerobic exercise 5x's/wk. Heart healthy diet and risk factor modification.    See labs and med orders.  Change to cardizem cd 180  Clonidine prn  Patient low risk for surgery from CV standpoint. Continue BP meds nick-op.

## 2017-11-26 NOTE — PROGRESS NOTES
HISTORY OF PRESENT ILLNESS:  Pt. is a 57 y.o. female presents monitoring of her HTN, hypothyroidism, PMR/chronic use of steroids.  HTN is on current med.  She is currently seeing Dr. Adam Gonzalez, now [place] of rheumatology, was weaning off of prednisone, was on methotrexate/has been stopped after surgery, doing better.  She is overdue for TSH, is due for mammogram, has scheduled next week.  She had seen cardiology for clearance for ACL repair.  Thyroid nodule stable on recent US thyroid.  She has seen elevated B/Ps at home.    Health Maintenance Topics with due status: Not Due       Topic Last Completion Date    Colonoscopy 2011    TETANUS VACCINE 2016    Pap Smear with HPV Cotest 2016    Mammogram 2016    Lipid Panel 2017     There are no preventive care reminders to display for this patient.    Lab Results   Component Value Date    WBC 8.66 2017    HGB 13.1 2017    HCT 40.7 2017     2017    CHOL 171 2017    TRIG 258 (H) 2017    HDL 61 2017    LDLCALC 58.4 (L) 2017    ALT 14 2017    AST 16 2017     2017    K 3.7 2017     2017    CREATININE 0.9 2017    BUN 21 (H) 2017    CO2 30 (H) 2017    ALBUMIN 3.4 (L) 2017    TSH 1.212 2016    INR 1.0 2012    HGBA1C 5.4 2016       Past Medical History:   Diagnosis Date    Abnormal Pap smear of cervix     ascus     Dizziness     DJD (degenerative joint disease) of cervical spine     DJD (degenerative joint disease) of cervical spine     Hemorrhoids     HTN (hypertension)     Hypothyroidism     Polyarthritis rheumatica 2016       Past Surgical History:   Procedure Laterality Date    cervical fusion       SECTION      COLONOSCOPY      DEXA      , 2016    RHINOPLASTY TIP         Social History     Social History    Marital status:      Spouse name: N/A    Number of  children: 1    Years of education: N/A     Occupational History     [employer]     Social History Main Topics    Smoking status: Never Smoker    Smokeless tobacco: Never Used    Alcohol use 1.0 oz/week     2 Standard drinks or equivalent per week      Comment: several times a week    Drug use: No    Sexual activity: Yes     Partners: Male     Birth control/ protection: None     Other Topics Concern    None     Social History Narrative    None       ROS:  GENERAL: No fever, chills, fatigability or weight loss.  SKIN: No rashes, itching or changes in color or texture of skin.  HEAD: No headaches or recent head trauma.  EARS: Denies ear pain, discharge or vertigo.  NOSE: No loss of smell, no epistaxis or postnasal drip.  MOUTH & THROAT: No hoarseness or change in voice. No excessive gum bleeding.  NODES: Denies swollen glands.  CHEST: Denies JUARES, cyanosis, wheezing, cough and sputum production.  CARDIOVASCULAR: Denies chest pain, PND, orthopnea or reduced exercise tolerance.  ABDOMEN: Appetite fine. No weight loss. Denies constipation, diarrhea, abdominal pain, hematemesis or blood in stool.  URINARY: No flank pain, dysuria or hematuria.  PERIPHERAL VASCULAR: No claudication or cyanosis. No edema.  MUSCULOSKELETAL: Positive joint stiffness; minimal swelling. Denies back pain.  NEUROLOGIC: Denies numbness    PE:   Vitals:   Vitals:    11/28/17 0813   BP: (!) 142/84   Pulse: 65   Resp: 16   Temp: 97.8 °F (36.6 °C)     GENERAL: no acute distress, A&Ox3, comfortable.  Female with BMI of 30  HEENT: tympanic membranes clear, nasal mucosa pink, no pharyngeal erythema or exudate  NECK: supple, no cervical lymphadenopathy, no thyromegaly; no supraclavicular nodes;   CHEST:  clear to auscultation bilaterally, no crackles or wheeze; no increased work of breathing;  CARDIOVASCULAR: regular rate and rhythm, no rubs, murmurs or gallops.  ABDOMEN: normal bowel sounds, soft non-tender, non-distended; no  palpable organomegaly;   EXT: no clubbing, cyanosis or edema.     ASSESSMENT/PLAN:    Hypothyroidism, unspecified type  -     TSH; Future; Expected date: 11/28/2017    Hypertension: pt. Will followup with response; Dr. Meek had increased CCB to 180 mg po q day;  -     hydroCHLOROthiazide (MICROZIDE) 12.5 mg capsule; Take 1 capsule (12.5 mg total) by mouth once daily.  Dispense: 30 capsule; Refill: 1    Health Maintenance: has upcoming mammogram scheduled;      Medication List with Changes/Refills   New Medications    HYDROCHLOROTHIAZIDE (MICROZIDE) 12.5 MG CAPSULE    Take 1 capsule (12.5 mg total) by mouth once daily.   Current Medications    CALCIUM-VITAMIN D3 (CALCIUM 500 + D) 500 MG(1,250MG) -200 UNIT PER TABLET    Take 1 tablet by mouth 2 (two) times daily with meals.    CLONIDINE (CATAPRES) 0.1 MG TABLET    Take 1 tablet (0.1 mg total) by mouth 3 (three) times daily as needed (PRN SBP > 165 mmHg).    DILTIAZEM (CARDIZEM CD) 180 MG 24 HR CAPSULE    Take 1 capsule (180 mg total) by mouth once daily.    FLUTICASONE (FLONASE) 50 MCG/ACTUATION NASAL SPRAY    2 sprays by Each Nare route once daily.    FOLIC ACID (FOLVITE) 1 MG TABLET    1 TABLET BY MOUTH ONCE A DAY.    KETOCONAZOLE (NIZORAL) 2 % SHAMPOO    Wash hair with medicated shampoo at least 2x/week - let sit on scalp at least 5 minutes prior to rinsing    METHOTREXATE 2.5 MG TAB    4 TABS BY MOUTH ONCE WEEKLY ALL AT THE SAME TIME.    NYSTATIN (MYCOSTATIN) CREAM    Apply topically 2 (two) times daily.    PREDNISONE (DELTASONE) 1 MG TABLET        PREDNISONE (DELTASONE) 10 MG TABLET    Take 9 mg by mouth once daily.     SYNTHROID 50 MCG TABLET    TAKE 1 TABLET BY MOUTH EVERY MONDAY, TUESDAY, THURSDAY, FRIDAY, SUNDAY    SYNTHROID 75 MCG TABLET    TAKE 1 TABLET BY MOUTH TWICE A WEEK.   Discontinued Medications    HYDROCHLOROTHIAZIDE (MICROZIDE) 12.5 MG CAPSULE    Take 1 capsule (12.5 mg total) by mouth once daily.    HYDROCHLOROTHIAZIDE (MICROZIDE) 12.5 MG CAPSULE     Take 1 capsule (12.5 mg total) by mouth once daily.     Call if condition changes or worsens.

## 2017-11-28 ENCOUNTER — LAB VISIT (OUTPATIENT)
Dept: LAB | Facility: HOSPITAL | Age: 57
End: 2017-11-28
Attending: INTERNAL MEDICINE
Payer: COMMERCIAL

## 2017-11-28 ENCOUNTER — OFFICE VISIT (OUTPATIENT)
Dept: INTERNAL MEDICINE | Facility: CLINIC | Age: 57
End: 2017-11-28
Payer: COMMERCIAL

## 2017-11-28 VITALS
HEART RATE: 65 BPM | WEIGHT: 192.69 LBS | HEIGHT: 67 IN | BODY MASS INDEX: 30.24 KG/M2 | DIASTOLIC BLOOD PRESSURE: 84 MMHG | RESPIRATION RATE: 16 BRPM | TEMPERATURE: 98 F | SYSTOLIC BLOOD PRESSURE: 142 MMHG | OXYGEN SATURATION: 99 %

## 2017-11-28 DIAGNOSIS — I10 ESSENTIAL HYPERTENSION: ICD-10-CM

## 2017-11-28 DIAGNOSIS — E03.9 HYPOTHYROIDISM, UNSPECIFIED TYPE: Primary | ICD-10-CM

## 2017-11-28 DIAGNOSIS — Z00.00 HEALTH CARE MAINTENANCE: ICD-10-CM

## 2017-11-28 DIAGNOSIS — E03.9 HYPOTHYROIDISM, UNSPECIFIED TYPE: ICD-10-CM

## 2017-11-28 LAB — TSH SERPL DL<=0.005 MIU/L-ACNC: 1.93 UIU/ML

## 2017-11-28 PROCEDURE — 99213 OFFICE O/P EST LOW 20 MIN: CPT | Mod: S$GLB,,, | Performed by: INTERNAL MEDICINE

## 2017-11-28 PROCEDURE — 36415 COLL VENOUS BLD VENIPUNCTURE: CPT | Mod: PO

## 2017-11-28 PROCEDURE — 99999 PR PBB SHADOW E&M-EST. PATIENT-LVL III: CPT | Mod: PBBFAC,,, | Performed by: INTERNAL MEDICINE

## 2017-11-28 PROCEDURE — 84443 ASSAY THYROID STIM HORMONE: CPT

## 2017-11-28 RX ORDER — HYDROCHLOROTHIAZIDE 12.5 MG/1
12.5 CAPSULE ORAL DAILY
Qty: 30 CAPSULE | Refills: 1 | Status: SHIPPED | OUTPATIENT
Start: 2017-11-28 | End: 2018-01-28 | Stop reason: SDUPTHER

## 2017-11-28 RX ORDER — PREDNISONE 1 MG/1
2 TABLET ORAL DAILY
COMMUNITY
Start: 2017-09-01 | End: 2019-02-05

## 2017-12-04 ENCOUNTER — HOSPITAL ENCOUNTER (OUTPATIENT)
Dept: RADIOLOGY | Facility: HOSPITAL | Age: 57
Discharge: HOME | End: 2017-12-04
Attending: OBSTETRICS & GYNECOLOGY
Payer: COMMERCIAL

## 2017-12-04 ENCOUNTER — OFFICE VISIT (OUTPATIENT)
Dept: OBSTETRICS AND GYNECOLOGY | Facility: CLINIC | Age: 57
End: 2017-12-04
Payer: COMMERCIAL

## 2017-12-04 ENCOUNTER — PATIENT MESSAGE (OUTPATIENT)
Dept: CARDIOLOGY | Facility: CLINIC | Age: 57
End: 2017-12-04

## 2017-12-04 VITALS
BODY MASS INDEX: 31.11 KG/M2 | SYSTOLIC BLOOD PRESSURE: 132 MMHG | HEIGHT: 66 IN | WEIGHT: 193.56 LBS | DIASTOLIC BLOOD PRESSURE: 86 MMHG

## 2017-12-04 VITALS — HEIGHT: 66 IN | WEIGHT: 193 LBS | BODY MASS INDEX: 31.02 KG/M2

## 2017-12-04 DIAGNOSIS — M35.3 PMR (POLYMYALGIA RHEUMATICA): ICD-10-CM

## 2017-12-04 DIAGNOSIS — Z12.31 VISIT FOR SCREENING MAMMOGRAM: ICD-10-CM

## 2017-12-04 DIAGNOSIS — Z01.419 ENCOUNTER FOR GYNECOLOGICAL EXAMINATION WITHOUT ABNORMAL FINDING: Primary | ICD-10-CM

## 2017-12-04 DIAGNOSIS — Z12.4 ENCOUNTER FOR PAP SMEAR OF CERVIX WITH HPV DNA COTESTING: ICD-10-CM

## 2017-12-04 PROCEDURE — 99999 PR PBB SHADOW E&M-EST. PATIENT-LVL III: CPT | Mod: PBBFAC,,, | Performed by: OBSTETRICS & GYNECOLOGY

## 2017-12-04 PROCEDURE — 77067 SCR MAMMO BI INCL CAD: CPT | Mod: TC,PN

## 2017-12-04 PROCEDURE — 99396 PREV VISIT EST AGE 40-64: CPT | Mod: S$GLB,,, | Performed by: OBSTETRICS & GYNECOLOGY

## 2017-12-04 PROCEDURE — 77067 SCR MAMMO BI INCL CAD: CPT | Mod: 26,,, | Performed by: RADIOLOGY

## 2017-12-04 PROCEDURE — 88175 CYTOPATH C/V AUTO FLUID REDO: CPT

## 2017-12-04 PROCEDURE — 77063 BREAST TOMOSYNTHESIS BI: CPT | Mod: 26,,, | Performed by: RADIOLOGY

## 2017-12-04 PROCEDURE — 87624 HPV HI-RISK TYP POOLED RSLT: CPT

## 2017-12-04 NOTE — PROGRESS NOTES
Chief Complaint   Patient presents with    Well Woman       History of Present Illness: Brandie Richmond is a 57 y.o. female that presents today 2017 for well gyn visit.    Past Medical History:   Diagnosis Date    Abnormal Pap smear of cervix     ascus     Dizziness     DJD (degenerative joint disease) of cervical spine     DJD (degenerative joint disease) of cervical spine     Hemorrhoids     HTN (hypertension)     Hypothyroidism     Polyarthritis rheumatica        Past Surgical History:   Procedure Laterality Date    cervical fusion       SECTION      COLONOSCOPY      DEXA      ,     RHINOPLASTY TIP         Current Outpatient Prescriptions   Medication Sig Dispense Refill    calcium-vitamin D3 (CALCIUM 500 + D) 500 mg(1,250mg) -200 unit per tablet Take 1 tablet by mouth 2 (two) times daily with meals.      cloNIDine (CATAPRES) 0.1 MG tablet Take 1 tablet (0.1 mg total) by mouth 3 (three) times daily as needed (PRN SBP > 165 mmHg). 90 tablet 6    diltiaZEM (CARDIZEM CD) 180 MG 24 hr capsule Take 1 capsule (180 mg total) by mouth once daily. 90 capsule 5    fluticasone (FLONASE) 50 mcg/actuation nasal spray 2 sprays by Each Nare route once daily. 16 g 12    folic acid (FOLVITE) 1 MG tablet 1 TABLET BY MOUTH ONCE A DAY.  3    hydroCHLOROthiazide (MICROZIDE) 12.5 mg capsule Take 1 capsule (12.5 mg total) by mouth once daily. 30 capsule 1    ketoconazole (NIZORAL) 2 % shampoo Wash hair with medicated shampoo at least 2x/week - let sit on scalp at least 5 minutes prior to rinsing 120 mL 5    predniSONE (DELTASONE) 1 MG tablet       SYNTHROID 50 mcg tablet TAKE 1 TABLET BY MOUTH EVERY MONDAY, TUESDAY, THURSDAY, FRIDAY,  30 tablet 2    SYNTHROID 75 mcg tablet TAKE 1 TABLET BY MOUTH TWICE A WEEK. 30 tablet 9    methotrexate 2.5 MG Tab 4 TABS BY MOUTH ONCE WEEKLY ALL AT THE SAME TIME.  3    nystatin (MYCOSTATIN) cream Apply topically 2 (two) times daily. 15 g 0     predniSONE (DELTASONE) 10 MG tablet Take 9 mg by mouth once daily.        No current facility-administered medications for this visit.        Review of patient's allergies indicates:   Allergen Reactions    Iodine      Other reaction(s): Rash       Family History   Problem Relation Age of Onset    Heart failure Mother     Heart disease Mother     Osteoporosis Mother     Heart failure Father     Heart disease Father     Hypertension Brother     Diabetes Maternal Grandfather     Breast cancer Neg Hx     Ovarian cancer Neg Hx        Social History     Social History    Marital status:      Spouse name: N/A    Number of children: 1    Years of education: N/A     Occupational History     [employer]     Social History Main Topics    Smoking status: Never Smoker    Smokeless tobacco: Never Used    Alcohol use 1.0 oz/week     2 Standard drinks or equivalent per week      Comment: several times a week    Drug use: No    Sexual activity: Yes     Partners: Male     Birth control/ protection: None, Post-menopausal     Other Topics Concern    None     Social History Narrative    None       OB History    Para Term  AB Living   1 1 1     1   SAB TAB Ectopic Multiple Live Births           1      # Outcome Date GA Lbr Kevon/2nd Weight Sex Delivery Anes PTL Lv   1 Term    3.175 kg (7 lb) M CS-LTranv EPI N MESSI          Review of Symptoms:  GENERAL: Denies weight gain or weight loss. Feeling well overall.   SKIN: Denies rash or lesions.   HEAD: Denies head injury or headache.   NODES: Denies enlarged lymph nodes.   CHEST: Denies chest pain or shortness of breath.   CARDIOVASCULAR: Denies palpitations or left sided chest pain.   ABDOMEN: No abdominal pain, constipation, diarrhea, nausea, vomiting or rectal bleeding.   URINARY: No frequency, dysuria, hematuria, or burning on urination.  HEMATOLOGIC: No easy bruisability or excessive bleeding.   MUSCULOSKELETAL: Denies joint  "pain or swelling.     /86   Ht 5' 6" (1.676 m)   Wt 87.8 kg (193 lb 9 oz)   LMP 12/06/2013   Physical Exam:  APPEARANCE: Well nourished, well developed, in no acute distress.  SKIN: Normal skin turgor, no lesions.  NECK: Neck symmetric without masses   RESPIRATORY: Normal respiratory effort with no retractions or use of accessory muscles  CARDIOVASCULAR: Peripheral vascular system with no swelling no varicosities and palpation of pulses normal  LYMPHATIC: No enlargements of the lymph nodes noted in the neck, axillae, or groin  ABDOMEN: Soft. No tenderness or masses. No hepatosplenomegaly. No hernias.  BREASTS: Symmetrical, no skin changes or visible lesions. No palpable masses, nipple discharge or adenopathy bilaterally.  PELVIC: Normal external female genitalia without lesions. Normal hair distribution. Adequate perineal body, normal urethral meatus. Urethra with no masses.  Bladder nontender. Vagina moist and well rugated without lesions or discharge. Cervix pink and without lesions. No significant cystocele or rectocele. Bimanual exam showed uterus normal size, shape, position, mobile and nontender. Adnexa without masses or tenderness. Urethra and bladder normal.   EXTREMITIES: No clubbing cyanosis or edema.    ASSESSMENT/PLAN:  Encounter for gynecological examination without abnormal finding    Visit for screening mammogram  -     Cancel: Mammo Digital Screening Bilat With CAD; Future; Expected date: 12/04/2017    PMR (polymyalgia rheumatica)    Encounter for Pap smear of cervix with HPV DNA cotesting  -     Liquid-based pap smear, screening  -     HPV High Risk Genotypes, PCR          Patient was counseled today on Pap guidelines, recommendation for pelvic exams, mammograms every other year after the age of 40 and annually after the age of 50, Colonoscopy after the age of 50, Dexa Bone Scan and calcium and vitamin D supplementation in menopause and to see her PCP for other health maintenance.   " FOLLOW-UP:prn

## 2017-12-04 NOTE — TELEPHONE ENCOUNTER
Please advise:   Pt. States  BP elevated since ACL reconstruction surgery on Oct 3rd, been using the clonidine more often.  Dr. Arrieta prescribed hydrochlorithiazide 12.5 mg daily.  pt gets headaches and blurry vision, and it does not lower BP.     Wants to know can she increase the dosage for the cardizem? have not had any obvious side effects on the 180 mg dosage.

## 2017-12-05 DIAGNOSIS — E03.9 HYPOTHYROIDISM: ICD-10-CM

## 2017-12-05 RX ORDER — LEVOTHYROXINE SODIUM 50 UG/1
TABLET ORAL
Qty: 30 TABLET | Refills: 11 | Status: SHIPPED | OUTPATIENT
Start: 2017-12-05 | End: 2019-01-07 | Stop reason: SDUPTHER

## 2017-12-06 RX ORDER — DILTIAZEM HYDROCHLORIDE 240 MG/1
240 CAPSULE, COATED, EXTENDED RELEASE ORAL NIGHTLY
Qty: 90 CAPSULE | Refills: 3 | Status: SHIPPED | OUTPATIENT
Start: 2017-12-06 | End: 2018-11-06 | Stop reason: ALTCHOICE

## 2017-12-12 LAB
HPV16 AG SPEC QL: NEGATIVE
HPV16+18+H RISK 12 DNA CVX-IMP: NEGATIVE
HPV18 DNA SPEC QL NAA+PROBE: NEGATIVE

## 2018-01-22 ENCOUNTER — PATIENT MESSAGE (OUTPATIENT)
Dept: ADMINISTRATIVE | Facility: OTHER | Age: 58
End: 2018-01-22

## 2018-01-25 ENCOUNTER — PATIENT MESSAGE (OUTPATIENT)
Dept: ADMINISTRATIVE | Facility: OTHER | Age: 58
End: 2018-01-25

## 2018-01-26 ENCOUNTER — PATIENT OUTREACH (OUTPATIENT)
Dept: OTHER | Facility: OTHER | Age: 58
End: 2018-01-26

## 2018-01-26 NOTE — LETTER
Kristin Raman, PharmD  [address] [place] [address]     Dear Brandie Richmond,    Welcome to the [facility]pert[facility] Digital [facility] Program!         My name is Kristin Raman PharmD and I am your dedicated Digital Medicine clinician.  As an expert in medication management, I will help ensure that the medications you are taking continue to provide you with the intended benefits.      I am Briana Harper and I will be your health  for the duration of the program.  My  job is to help you identify lifestyle changes to improve your blood pressure control.  We will talk about nutrition, exercise, and other ways that you may be able to adjust your current habits to better your health. Together, we will work to improve your overall health and encourage you to meet your goals for a healthier lifestyle.    What we expect from YOU:    You will need to take blood pressure readings multiple times a week and no less than one reading per week.   It is important that you take your measurements at different times during the day, when possible.     What you should expect from your Digital Medicine Care Team:   We will provide you with education about high blood pressure, including lifestyle changes that could help you to control your blood pressure.   We will review your weekly readings and provide you with monthly blood pressure progress reports after you have been in the program for more than 30 days.   We will send monthly progress reports on your blood pressure control to your physician so they can follow along with your progress as well.    You will be able to reach me by phone at [phone] or through your [facility] account by clicking my name under Care Team on the right side of the home screen.    I look forward to working with you to achieve your blood pressure goals!    Sincerely,    Kristin Raman PharmD  Your personal clinician    Please visit  [email or web address] to learn more about high blood pressure and what you can do lower your blood pressure.                                                                                           Brandie Richmond  [address]  [place] [address]

## 2018-01-26 NOTE — PROGRESS NOTES
Ms. Brandie Richmond is a 57 y.o. female who is newly enrolled in the [facility] Medicine Hypertension Clinic.     The following information was reviewed/updated:  Preferred pharmacy   [id number] - [place], [place] - [address] [facility]  [address]  [facility] [address]  Phone: [phone] Fax: [phone]    [facility] [address] [place] [address] [address] [facility]  [address] [facility] [address]  Phone: [phone] Fax: [phone]    Patient prefers a 30 days supply    Review of patient's allergies indicates:   Allergen Reactions    Iodine      Other reaction(s): Rash     Current Outpatient Prescriptions on File Prior to Visit   Medication Sig Dispense Refill    calcium-vitamin D3 (CALCIUM 500 + D) 500 mg(1,250mg) -200 unit per tablet Take 1 tablet by mouth 2 (two) times daily with meals.      cloNIDine (CATAPRES) 0.1 MG tablet Take 1 tablet (0.1 mg total) by mouth 3 (three) times daily as needed (PRN SBP > 165 mmHg). 90 tablet 6    diltiaZEM (CARDIZEM CD) 240 MG 24 hr capsule Take 1 capsule (240 mg total) by mouth every evening. 90 capsule 3    fluticasone (FLONASE) 50 mcg/actuation nasal spray 2 sprays by Each Nare route once daily. 16 g 12    folic acid (FOLVITE) 1 MG tablet 1 TABLET BY MOUTH ONCE A DAY.  3    hydroCHLOROthiazide (MICROZIDE) 12.5 mg capsule Take 1 capsule (12.5 mg total) by mouth once daily. 30 capsule 1    ketoconazole (NIZORAL) 2 % shampoo Wash hair with medicated shampoo at least 2x/week - let sit on scalp at least 5 minutes prior to rinsing 120 mL 5    methotrexate 2.5 MG Tab 4 TABS BY MOUTH ONCE WEEKLY ALL AT THE SAME TIME.  3    nystatin (MYCOSTATIN) cream Apply topically 2 (two) times daily. 15 g 0    predniSONE (DELTASONE) 1 MG tablet       predniSONE (DELTASONE) 10 MG tablet Take 9 mg by mouth once daily.       SYNTHROID 50 mcg tablet TAKE 1 TABLET BY MOUTH EVERY MONDAY, TUESDAY,  THURSDAY,FRIDAY,AND SUNDAY 30 tablet 11    SYNTHROID 75 mcg tablet TAKE 1 TABLET BY MOUTH TWICE A WEEK. 30 tablet 9     No current facility-administered medications on file prior to visit.        Reviewed non-pharmacologic therapies and impact on BP:  Patient had a physical therapy appointment to make; defer to health   1. Low-sodium diet- 11 mmHg reduction 2-4 weeks. I have reviewed [facility] diet sodium restrictions (<2000mg/day)  2. Exercise- 7 mmHg reduction 4-12 weeks. I have recommended patient engage in exercise as tolerated at least 30 minutes 5x per week to improve cardiovascular health.    3. Alcohol intake- 3 mmHg reduction 4-12 weeks. I have discussed with patient the maximum recommended number of 1 drink(s) per day for women.     Explained that we expect patient to obtain several blood pressures per week at random times of day.   Our goal is to get  BP to consistently below 130/80mmHg and make the process convenient so patient can avoid extra trips to the office. Getting your blood pressure below 130/80mmHg (definition of control) will reduce your risk for heart attack, kidney failure, stroke and death (as well as kidney failure, eye disease, & dementia).     Patient is not meeting the goal already.   When asked what the patient thinks is causing BP to be elevated, she states:    Instructed patient not to allow anyone else to use phone and BP cuff.   I'm not available for emergencies. Patient will call [facility] on-call ([phone] or [phone]) or [phone] if needed.     Discussed appropriate BP measuring technique:  Before taking your blood pressure  Find a quiet place. You will need to listen for your heartbeat.  Roll up the sleeve on your left arm or remove any tight-sleeved clothing, if needed. (It's best to take your blood pressure from your left arm if you are right-handed.You can use the other arm if you have been told by your health care provider to do so.)  Rest in a chair next to a table  for 5 to 10 minutes. (Your left arm should rest comfortably at heart level.)  Sit up straight with your back against the chair, legs uncrossed and on the ground.  Rest your forearm on the table with the palm of your hand facing up.  You should not talk, read the newspaper, or watch television during this process.      Patient and I agreed that she will continue to monitor blood pressure and sodium intake, and continue to remain adherent to medications.     I will plan to follow-up with the patient in 3-4 weeks.   Emailed patient link to [facility] webpage and my contact information in case she has any questions.     Last 5 Patient Entered Readings                                      Current 30 Day Average: 138/83     Recent Readings 1/30/2018 1/29/2018 1/28/2018 1/27/2018 1/26/2018    SBP (mmHg) 131 123 131 122 162    DBP (mmHg) 78 78 79 80 91    Pulse 71 75 83 77 68          Patient has not been waiting 5 minutes prior to checking BP. Most BP readings have been taken prior to HCTZ dose. While her daily schedule varies, she will try to obtain some BP levels at least 1 hour after BP medications. Patient states plan is to decrease prednisone by 1 mg per month if she is able to tolerate. She is aware of the effect prednisone has on BP.

## 2018-01-28 ENCOUNTER — TELEPHONE (OUTPATIENT)
Dept: INTERNAL MEDICINE | Facility: CLINIC | Age: 58
End: 2018-01-28

## 2018-01-28 DIAGNOSIS — Z51.81 MEDICATION MONITORING ENCOUNTER: Primary | ICD-10-CM

## 2018-01-29 RX ORDER — HYDROCHLOROTHIAZIDE 12.5 MG/1
CAPSULE ORAL
Qty: 30 CAPSULE | Refills: 0 | Status: SHIPPED | OUTPATIENT
Start: 2018-01-29 | End: 2018-03-31 | Stop reason: SDUPTHER

## 2018-01-30 RX ORDER — MULTIVITAMIN
1 TABLET ORAL DAILY
COMMUNITY

## 2018-01-30 RX ORDER — SELENIUM 50 MCG
50 TABLET ORAL DAILY
COMMUNITY
End: 2018-08-30 | Stop reason: CLARIF

## 2018-01-30 RX ORDER — AMOXICILLIN 500 MG
CAPSULE ORAL
COMMUNITY
End: 2019-11-11

## 2018-01-31 ENCOUNTER — LAB VISIT (OUTPATIENT)
Dept: LAB | Facility: HOSPITAL | Age: 58
End: 2018-01-31
Attending: INTERNAL MEDICINE
Payer: COMMERCIAL

## 2018-01-31 DIAGNOSIS — Z51.81 MEDICATION MONITORING ENCOUNTER: ICD-10-CM

## 2018-01-31 LAB
ANION GAP SERPL CALC-SCNC: 11 MMOL/L
BUN SERPL-MCNC: 21 MG/DL
CALCIUM SERPL-MCNC: 9.5 MG/DL
CHLORIDE SERPL-SCNC: 101 MMOL/L
CO2 SERPL-SCNC: 28 MMOL/L
CREAT SERPL-MCNC: 1 MG/DL
EST. GFR  (AFRICAN AMERICAN): >60 ML/MIN/1.73 M^2
EST. GFR  (NON AFRICAN AMERICAN): >60 ML/MIN/1.73 M^2
GLUCOSE SERPL-MCNC: 89 MG/DL
POTASSIUM SERPL-SCNC: 3.4 MMOL/L
SODIUM SERPL-SCNC: 140 MMOL/L

## 2018-01-31 PROCEDURE — 36415 COLL VENOUS BLD VENIPUNCTURE: CPT | Mod: PO

## 2018-01-31 PROCEDURE — 80048 BASIC METABOLIC PNL TOTAL CA: CPT

## 2018-02-01 ENCOUNTER — PATIENT OUTREACH (OUTPATIENT)
Dept: OTHER | Facility: OTHER | Age: 58
End: 2018-02-01

## 2018-02-01 NOTE — PROGRESS NOTES
"Last 5 Patient Entered Readings                                      Current 30 Day Average: 136/81     Recent Readings 2/1/2018 1/31/2018 1/30/2018 1/29/2018 1/28/2018    SBP (mmHg) 111 149 131 123 131    DBP (mmHg) 72 75 78 78 79    Pulse 66 66 71 75 83          Hypertension Digital Medicine Program (HDMP): Health  Introduction    Introduced Mrs. Brandie Richmond to HDMP. Discussed health  role and recommended lifestyle modifications.    Diet (i.e. Low sodium, food labels): Reports she has started to reduce sodium by reading food labels, not adding salt to her food, and by increasing her amount of salads, and vegetables overall.  Notes her  does much of the cooking, and that he often grills or BBQ for  "meat and potatoes" meal.  Educated patient on benefits of continuing to monitor sodium on blood pressure, especially when adhering to <2000mg per day.  Verbalizes understanding and will send additional information on low sodium or salt-free seasonings.  Of note, patient states she has been successful with weight loss in the past when completing program with dietician and Rodrigue Cyr - however found this hard to continue after injuries and life changes.     Exercise: Per patient, completes PT exercises for knee 1-2x/day but does not generally complete any other cardiovascular activity. States she would like to begin strengthening and cardio once cleared to do so, and agrees to setting goal of researching local places to exercise to assist her in her overall goal of weight loss. States this is very important to her and that she is confident she will be able to research an option to help her fit her needs.   agrees to research additional programs/exercise facilities to assist patient on [facility] near [place].     Alcohol/Tobacco: Did not review this call. Will inquire at follow up.     Medication Adherence: has been compliant with the medicaiton regimen      Reviewed AHA recommendations:  Limit " sodium intake to <2000mg/day  Perform 150 minutes of physical activity per week    Patient is aware of the importance of taking daily BP readings at various times of the day  Patient aware that the health  can be used as a resource for lifestyle modifications to help reduce or maintain a healthy BP  Patient is aware of the importance of medication adherence.  [facility]. Patient should call 911 or [facility] if one arises.    3/1 FU pushed back 2 weeks per Edouard Raman.

## 2018-02-14 DIAGNOSIS — E03.9 HYPOTHYROIDISM, UNSPECIFIED TYPE: ICD-10-CM

## 2018-02-15 RX ORDER — LEVOTHYROXINE SODIUM 75 UG/1
TABLET ORAL
Qty: 30 TABLET | Refills: 9 | Status: SHIPPED | OUTPATIENT
Start: 2018-02-15 | End: 2019-01-07 | Stop reason: SDUPTHER

## 2018-02-22 ENCOUNTER — LAB VISIT (OUTPATIENT)
Dept: LAB | Facility: HOSPITAL | Age: 58
End: 2018-02-22
Attending: INTERNAL MEDICINE
Payer: COMMERCIAL

## 2018-02-22 DIAGNOSIS — Z82.49 FAMILY HISTORY OF CARDIOVASCULAR DISEASE: Chronic | ICD-10-CM

## 2018-02-22 DIAGNOSIS — I10 ESSENTIAL HYPERTENSION: ICD-10-CM

## 2018-02-22 LAB
ALBUMIN SERPL BCP-MCNC: 3.6 G/DL
ALP SERPL-CCNC: 61 U/L
ALT SERPL W/O P-5'-P-CCNC: 12 U/L
ANION GAP SERPL CALC-SCNC: 13 MMOL/L
AST SERPL-CCNC: 15 U/L
BILIRUB SERPL-MCNC: 0.6 MG/DL
BUN SERPL-MCNC: 21 MG/DL
CALCIUM SERPL-MCNC: 9.7 MG/DL
CHLORIDE SERPL-SCNC: 102 MMOL/L
CHOLEST SERPL-MCNC: 236 MG/DL
CHOLEST/HDLC SERPL: 3.6 {RATIO}
CO2 SERPL-SCNC: 27 MMOL/L
CREAT SERPL-MCNC: 1.1 MG/DL
EST. GFR  (AFRICAN AMERICAN): >60 ML/MIN/1.73 M^2
EST. GFR  (NON AFRICAN AMERICAN): 55.5 ML/MIN/1.73 M^2
GLUCOSE SERPL-MCNC: 88 MG/DL
HDLC SERPL-MCNC: 65 MG/DL
HDLC SERPL: 27.5 %
LDLC SERPL CALC-MCNC: 142.6 MG/DL
NONHDLC SERPL-MCNC: 171 MG/DL
POTASSIUM SERPL-SCNC: 3.7 MMOL/L
PROT SERPL-MCNC: 7.5 G/DL
SODIUM SERPL-SCNC: 142 MMOL/L
TRIGL SERPL-MCNC: 142 MG/DL

## 2018-02-22 PROCEDURE — 80053 COMPREHEN METABOLIC PANEL: CPT

## 2018-02-22 PROCEDURE — 80061 LIPID PANEL: CPT

## 2018-02-22 PROCEDURE — 36415 COLL VENOUS BLD VENIPUNCTURE: CPT | Mod: PO

## 2018-02-27 ENCOUNTER — OFFICE VISIT (OUTPATIENT)
Dept: CARDIOLOGY | Facility: CLINIC | Age: 58
End: 2018-02-27
Payer: COMMERCIAL

## 2018-02-27 ENCOUNTER — PATIENT OUTREACH (OUTPATIENT)
Dept: OTHER | Facility: OTHER | Age: 58
End: 2018-02-27

## 2018-02-27 VITALS
HEIGHT: 66 IN | SYSTOLIC BLOOD PRESSURE: 136 MMHG | WEIGHT: 188.06 LBS | DIASTOLIC BLOOD PRESSURE: 66 MMHG | HEART RATE: 62 BPM | BODY MASS INDEX: 30.22 KG/M2

## 2018-02-27 DIAGNOSIS — M35.3 PMR (POLYMYALGIA RHEUMATICA): ICD-10-CM

## 2018-02-27 DIAGNOSIS — I10 ESSENTIAL HYPERTENSION: Primary | ICD-10-CM

## 2018-02-27 DIAGNOSIS — Z82.49 FAMILY HISTORY OF CARDIOVASCULAR DISEASE: Chronic | ICD-10-CM

## 2018-02-27 PROCEDURE — 99213 OFFICE O/P EST LOW 20 MIN: CPT | Mod: S$GLB,,, | Performed by: INTERNAL MEDICINE

## 2018-02-27 PROCEDURE — 99999 PR PBB SHADOW E&M-EST. PATIENT-LVL III: CPT | Mod: PBBFAC,,, | Performed by: INTERNAL MEDICINE

## 2018-02-27 PROCEDURE — 3008F BODY MASS INDEX DOCD: CPT | Mod: S$GLB,,, | Performed by: INTERNAL MEDICINE

## 2018-02-27 NOTE — PROGRESS NOTES
Subjective:    Patient ID:  Brandie Richmond is a 57 y.o. female who presents for follow-up of Hypertension (6 month f/u - review labs )      HPI  Here for f/u HTN/fam HX CAD/PMR. Patients states is doing well no chest pain, SOB or change in exertional tolerence. Patient does not exercise but remains very active with out change in exertional tolerance or chest pain.     Review of Systems   Constitution: Negative for malaise/fatigue.   Eyes: Negative for blurred vision.   Cardiovascular: Negative for chest pain, claudication, cyanosis, dyspnea on exertion, irregular heartbeat, leg swelling, near-syncope, orthopnea, palpitations, paroxysmal nocturnal dyspnea and syncope.   Respiratory: Negative for cough and shortness of breath.    Hematologic/Lymphatic: Does not bruise/bleed easily.   Musculoskeletal: Negative for back pain, falls, joint pain, muscle cramps, muscle weakness and myalgias.   Gastrointestinal: Negative for abdominal pain, change in bowel habit, nausea and vomiting.   Genitourinary: Negative for urgency.   Neurological: Negative for dizziness, focal weakness and light-headedness.        Objective:    Physical Exam   Constitutional: She is oriented to person, place, and time. She appears well-developed and well-nourished.   HENT:   Head: Normocephalic.   Eyes: Conjunctivae are normal.   Neck: Normal range of motion. Neck supple. No JVD present.   Cardiovascular: Normal rate, regular rhythm, normal heart sounds and intact distal pulses.    Pulses:       Carotid pulses are 2+ on the right side, and 2+ on the left side.       Radial pulses are 2+ on the right side, and 2+ on the left side.        Dorsalis pedis pulses are 2+ on the right side, and 2+ on the left side.        Posterior tibial pulses are 2+ on the right side, and 2+ on the left side.   Pulmonary/Chest: Effort normal and breath sounds normal.   Abdominal: Soft. Bowel sounds are normal.   Musculoskeletal: She exhibits no edema or tenderness.    Neurological: She is alert and oriented to person, place, and time. Gait normal.   Skin: Skin is warm, dry and intact. No cyanosis. Nails show no clubbing.   Psychiatric: She has a normal mood and affect. Her speech is normal and behavior is normal. Thought content normal.   Nursing note and vitals reviewed.            ..    Chemistry        Component Value Date/Time     02/22/2018 0824    K 3.7 02/22/2018 0824     02/22/2018 0824    CO2 27 02/22/2018 0824    BUN 21 (H) 02/22/2018 0824    CREATININE 1.1 02/22/2018 0824    GLU 88 02/22/2018 0824        Component Value Date/Time    CALCIUM 9.7 02/22/2018 0824    ALKPHOS 61 02/22/2018 0824    AST 15 02/22/2018 0824    ALT 12 02/22/2018 0824    BILITOT 0.6 02/22/2018 0824    ESTGFRAFRICA >60.0 02/22/2018 0824    EGFRNONAA 55.5 (A) 02/22/2018 0824            ..  Lab Results   Component Value Date    CHOL 236 (H) 02/22/2018    CHOL 171 06/12/2017    CHOL 205 (H) 06/17/2016     Lab Results   Component Value Date    HDL 65 02/22/2018    HDL 61 06/12/2017    HDL 58 06/17/2016     Lab Results   Component Value Date    LDLCALC 142.6 02/22/2018    LDLCALC 58.4 (L) 06/12/2017    LDLCALC 124.0 06/17/2016     Lab Results   Component Value Date    TRIG 142 02/22/2018    TRIG 258 (H) 06/12/2017    TRIG 115 06/17/2016     Lab Results   Component Value Date    CHOLHDL 27.5 02/22/2018    CHOLHDL 35.7 06/12/2017    CHOLHDL 28.3 06/17/2016     ..  Lab Results   Component Value Date    WBC 8.66 06/12/2017    HGB 13.1 06/12/2017    HCT 40.7 06/12/2017    MCV 97 06/12/2017     06/12/2017       Test(s) Reviewed  I have reviewed the following in detail:  [] Stress test   [] Angiography   [x] Echocardiogram   [x] Labs   [] Other:       Assessment:         ICD-10-CM ICD-9-CM   1. Essential hypertension I10 401.9   2. Family history of cardiovascular disease Z82.49 V17.49   3. PMR (polymyalgia rheumatica) M35.3 725     Problem List Items Addressed This Visit     Essential  hypertension - Primary    Family history of cardiovascular disease (Chronic)    PMR (polymyalgia rheumatica)           Plan:       Return to clinic 1 year   Aerobic exercise 5x's/wk. Heart healthy diet and risk factor modification.    See labs and med orders.  Lipid panel in 5/2017 f/u PCP if still elevated try zetia

## 2018-02-27 NOTE — PROGRESS NOTES
Last 5 Patient Entered Readings                                      Current 30 Day Average: 144/78     Recent Readings 2/27/2018 2/26/2018 2/25/2018 2/24/2018 2/23/2018    SBP (mmHg) 146 149 157 158 153    DBP (mmHg) 84 68 87 84 77    Pulse 65 67 79 67 56          Patient's BP average is above goal of <130/80.     Patient denies s/s of hypotension (lightheadedness, dizziness, nausea, fatigue) associated with low readings. Instructed patient to inform me if this occurs, patient confirms understanding.      Patient denies s/s of hypertension (SOB, CP, severe headaches, changes in vision) associated with high readings. Instructed patient to go to the ED if BP > 180/110 and accompanied by hypertensive s/s, patient confirms understanding.    Will continue to monitor regularly. Will follow up in 2-3 weeks, sooner if BP begins to trend upward or downward.    Patient has my contact information and knows to call with any concerns or clinical changes.     Current HTN regimen:  Hypertension Medications             cloNIDine (CATAPRES) 0.1 MG tablet Take 1 tablet (0.1 mg total) by mouth 3 (three) times daily as needed (PRN SBP > 165 mmHg).    diltiaZEM (CARDIZEM CD) 240 MG 24 hr capsule Take 1 capsule (240 mg total) by mouth every evening.    hydroCHLOROthiazide (MICROZIDE) 12.5 mg capsule TAKE 1 CAPSULE(12.5 MG) BY MOUTH EVERY DAY        Patient has appointment with her cardiologist, Dr. Meek, today and would like to discuss medication changes with him. She was previously on olmesartan and associated gastroenteritis with it. She subsequently tried other ACE-I and ARBs with side effects. Would consider starting amlodipine and stopping diltiazem. Could initiate beta blocker in place of diltiazem if needed for headache prevention. Unclear why metoprolol changed to diltiazem in past.

## 2018-03-06 ENCOUNTER — PATIENT OUTREACH (OUTPATIENT)
Dept: OTHER | Facility: OTHER | Age: 58
End: 2018-03-06

## 2018-03-06 NOTE — PROGRESS NOTES
Last 5 Patient Entered Readings                                      Current 30 Day Average: 145/78     Recent Readings 3/7/2018 3/6/2018 3/5/2018 3/4/2018 3/3/2018    SBP (mmHg) 139 148 163 123 148    DBP (mmHg) 80 78 88 70 78    Pulse 65 60 61 74 77          Patient's BP average is above goal of <130/80.     Patient denies s/s of hypotension (lightheadedness, dizziness, nausea, fatigue) associated with low readings. Instructed patient to inform me if this occurs, patient confirms understanding.      Patient denies s/s of hypertension (SOB, CP, severe headaches, changes in vision) associated with high readings. Instructed patient to go to the ED if BP > 180/110 and accompanied by hypertensive s/s, patient confirms understanding.    Will continue to monitor regularly. Will follow up in 2-3 weeks, sooner if BP begins to trend upward or downward.    Patient has my contact information and knows to call with any concerns or clinical changes.     Current HTN regimen:  Hypertension Medications             cloNIDine (CATAPRES) 0.1 MG tablet Take 1 tablet (0.1 mg total) by mouth 3 (three) times daily as needed (PRN SBP > 165 mmHg).    diltiaZEM (CARDIZEM CD) 240 MG 24 hr capsule Take 1 capsule (240 mg total) by mouth every evening.    hydroCHLOROthiazide (MICROZIDE) 12.5 mg capsule TAKE 1 CAPSULE(12.5 MG) BY MOUTH EVERY DAY          No medication changes made by Dr. Meek. Patient plans to make healthy lifestyle changes, including diet to decrease cholesterol. She reports polymyalgia flare over past couple of days and stress at work. BP readings often taken in morning before work and less than 1 hour after BP medications. Reminded patient to wait at least 1 hour after medications to take BP so she may wait until she returns home from work.    3/20 - health  is attempting contact and will review BP cuff placement and technique with patient as office visit BP was lower

## 2018-03-15 ENCOUNTER — PATIENT OUTREACH (OUTPATIENT)
Dept: OTHER | Facility: OTHER | Age: 58
End: 2018-03-15

## 2018-03-15 NOTE — PROGRESS NOTES
Last 5 Patient Entered Readings                                      Current 30 Day Average: 147/79     Recent Readings 3/21/2018 3/20/2018 3/18/2018 3/17/2018 3/16/2018    SBP (mmHg) 153 150 157 140 144    DBP (mmHg) 88 77 77 79 79    Pulse 66 69 70 66 89          Hypertension Digital Medicine Program (HDMP): Health  Follow Up    Lifestyle Modifications:    1.Low sodium diet: yes Reports she has started to reduce red meat and animal products and is focusing on increasing plants and fish in her diet.  States she would like to decrease her cholesterol and better manage her blood pressure, and feels that with new nutrition regimen she will be able to do so.  Has been eating yogurt with granola and berries for breakfast then focusing on fish or chicken with vegetables for other meal times.  Notes she also changed from SmartBalance to Benecol. Patient states she feels confident in new routine and declines further coaching/resources in this area at this time. Encouraged to call sooner should this change.     2.Physical activity: yes Reports that after researching several gyms she has decided to exercise in the morning at home and purchased some equipment to do so.  States this has gone much better for her and helps her be more consistent.  Notes they are going to be moving into a new house, and she is looking forward to getting out her bicycles at that time. Praised patient for finding a way to integrate exercise that worked for her; discussed other ways to continue exercising at home including videos.     3.Hypotension/Hypertension symptoms: no Denies s/sx.     4.Patient has been compliant with the medication regimen.  Did not review bp technique with patient, as patient did not answer when I tried calling back to review. Will want to inquire about timing/technique at next call to ensure accuracy.     Follow up with Mrs. Brandie Richmond completed. No further questions or concerns. I will follow up in a few weeks to  assess progress. BP is not at goal.

## 2018-04-01 RX ORDER — HYDROCHLOROTHIAZIDE 12.5 MG/1
CAPSULE ORAL
Qty: 30 CAPSULE | Refills: 1 | Status: SHIPPED | OUTPATIENT
Start: 2018-04-01 | End: 2018-05-22 | Stop reason: SDUPTHER

## 2018-04-03 ENCOUNTER — PATIENT OUTREACH (OUTPATIENT)
Dept: OTHER | Facility: OTHER | Age: 58
End: 2018-04-03

## 2018-04-03 NOTE — PROGRESS NOTES
Last 5 Patient Entered Readings                                      Current 30 Day Average: 148/80     Recent Readings 4/3/2018 4/2/2018 3/30/2018 3/29/2018 3/28/2018    SBP (mmHg) 135 160 152 143 150    DBP (mmHg) 75 81 79 74 84    Pulse 75 71 58 65 66          Patient's BP average is above goal of <130/80.     Patient denies s/s of hypotension (lightheadedness, dizziness, nausea, fatigue) associated with low readings. Instructed patient to inform me if this occurs, patient confirms understanding.      Patient denies s/s of hypertension (SOB, CP, severe headaches, changes in vision) associated with high readings. Instructed patient to go to the ED if BP > 180/110 and accompanied by hypertensive s/s, patient confirms understanding.    Will continue to monitor regularly. Will follow up in 2-3 weeks, sooner if BP begins to trend upward or downward.    Patient has my contact information and knows to call with any concerns or clinical changes.     Current HTN regimen:  Hypertension Medications             cloNIDine (CATAPRES) 0.1 MG tablet Take 1 tablet (0.1 mg total) by mouth 3 (three) times daily as needed (PRN SBP > 165 mmHg).    diltiaZEM (CARDIZEM CD) 240 MG 24 hr capsule Take 1 capsule (240 mg total) by mouth every evening.    hydroCHLOROthiazide (MICROZIDE) 12.5 mg capsule TAKE 1 CAPSULE(12.5 MG) BY MOUTH EVERY DAY        Reviewed BP cuff placement and measurement technique with patient; she is not consistently resting prior to measurement. Evening readings tend to be higher and are taken between 20-30 minutes before diltiazem to 20-30 minutes after diltiazem. Reviewed timing of BP measurement relative to medications.

## 2018-04-13 ENCOUNTER — PATIENT OUTREACH (OUTPATIENT)
Dept: OTHER | Facility: OTHER | Age: 58
End: 2018-04-13

## 2018-04-13 NOTE — PROGRESS NOTES
Last 5 Patient Entered Readings                                      Current 30 Day Average: 143/78     Recent Readings 4/19/2018 4/17/2018 4/15/2018 4/14/2018 4/13/2018    SBP (mmHg) 115 153 121 145 125    DBP (mmHg) 65 82 76 66 84    Pulse 67 62 61 67 63        Hypertension Digital Medicine Program (HDMP): Health  Follow Up    Lifestyle Modifications:    1.Low sodium diet: yes States she has been monitoring sodium by reading food labels and by brining salads with lean protein rather than sandwiches for lunch. Notes she feels this is going well for her and is aiming to lose about 1lb per week. Reports success with this so far, noting she can tell in both her weight and blood pressure when she is more mindful of sodium intake. Praised for progress thus far and encouraged to continue finding ways to balance a healthy lifestyle.     2.Physical activity: no Deferred.     3.Hypotension/Hypertension symptoms: no Denies s/sx. Reports improved technique with blood pressure, is resting prior to taking readings and is now taking them before consuming caffeine rather than after. Notes she was previously consuming 1-2 cups of coffee prior to some readings.     4.Patient has been compliant with the medication regimen.     Follow up with Mrs. Brandie Richmond completed. No further questions or concerns. I will follow up in a few weeks to assess progress. Update new weight loss goal total with patient in chart at next call.

## 2018-05-01 ENCOUNTER — PATIENT OUTREACH (OUTPATIENT)
Dept: OTHER | Facility: OTHER | Age: 58
End: 2018-05-01

## 2018-05-01 NOTE — PROGRESS NOTES
Last 5 Patient Entered Readings                                      Current 30 Day Average: 134/76     Recent Readings 5/1/2018 4/30/2018 4/29/2018 4/28/2018 4/27/2018    SBP (mmHg) 125 128 133 126 129    DBP (mmHg) 78 75 74 69 73    Pulse 69 66 65 64 62          Patient's BP average is above goal of <130/80.     Patient denies s/s of hypotension (lightheadedness, dizziness, nausea, fatigue) associated with low readings. Instructed patient to inform me if this occurs, patient confirms understanding.      Patient denies s/s of hypertension (SOB, CP, severe headaches, changes in vision) associated with high readings. Instructed patient to go to the ED if BP > 180/110 and accompanied by hypertensive s/s, patient confirms understanding.    Will continue to monitor regularly. Will follow up in 4-6 weeks, sooner if BP begins to trend upward or downward.    Patient has my contact information and knows to call with any concerns or clinical changes.     Current HTN regimen:  Hypertension Medications             cloNIDine (CATAPRES) 0.1 MG tablet Take 1 tablet (0.1 mg total) by mouth 3 (three) times daily as needed (PRN SBP > 165 mmHg).    diltiaZEM (CARDIZEM CD) 240 MG 24 hr capsule Take 1 capsule (240 mg total) by mouth every evening.    hydroCHLOROthiazide (MICROZIDE) 12.5 mg capsule TAKE 1 CAPSULE(12.5 MG) BY MOUTH EVERY DAY        BP improving. Patient confirms decreasing salt intake, increasing walking, and changing timing of BP measurement to before caffeine intake. She is trying to lose 1 pound per week.

## 2018-05-11 ENCOUNTER — LAB VISIT (OUTPATIENT)
Dept: LAB | Facility: HOSPITAL | Age: 58
End: 2018-05-11
Attending: INTERNAL MEDICINE
Payer: COMMERCIAL

## 2018-05-11 DIAGNOSIS — Z82.49 FAMILY HISTORY OF CARDIOVASCULAR DISEASE: Chronic | ICD-10-CM

## 2018-05-11 DIAGNOSIS — I10 ESSENTIAL HYPERTENSION: ICD-10-CM

## 2018-05-11 DIAGNOSIS — M35.3 PMR (POLYMYALGIA RHEUMATICA): ICD-10-CM

## 2018-05-11 LAB
ALBUMIN SERPL BCP-MCNC: 3.6 G/DL
ALP SERPL-CCNC: 68 U/L
ALT SERPL W/O P-5'-P-CCNC: 11 U/L
ANION GAP SERPL CALC-SCNC: 8 MMOL/L
AST SERPL-CCNC: 14 U/L
BILIRUB SERPL-MCNC: 0.4 MG/DL
BUN SERPL-MCNC: 18 MG/DL
CALCIUM SERPL-MCNC: 9.5 MG/DL
CHLORIDE SERPL-SCNC: 103 MMOL/L
CHOLEST SERPL-MCNC: 211 MG/DL
CHOLEST/HDLC SERPL: 3 {RATIO}
CO2 SERPL-SCNC: 31 MMOL/L
CREAT SERPL-MCNC: 0.9 MG/DL
EST. GFR  (AFRICAN AMERICAN): >60 ML/MIN/1.73 M^2
EST. GFR  (NON AFRICAN AMERICAN): >60 ML/MIN/1.73 M^2
GLUCOSE SERPL-MCNC: 86 MG/DL
HDLC SERPL-MCNC: 70 MG/DL
HDLC SERPL: 33.2 %
LDLC SERPL CALC-MCNC: 108.6 MG/DL
NONHDLC SERPL-MCNC: 141 MG/DL
POTASSIUM SERPL-SCNC: 3.4 MMOL/L
PROT SERPL-MCNC: 7.2 G/DL
SODIUM SERPL-SCNC: 142 MMOL/L
TRIGL SERPL-MCNC: 162 MG/DL

## 2018-05-11 PROCEDURE — 80061 LIPID PANEL: CPT

## 2018-05-11 PROCEDURE — 36415 COLL VENOUS BLD VENIPUNCTURE: CPT | Mod: PO

## 2018-05-11 PROCEDURE — 80053 COMPREHEN METABOLIC PANEL: CPT

## 2018-05-22 DIAGNOSIS — I10 HYPERTENSION, UNSPECIFIED TYPE: Primary | ICD-10-CM

## 2018-05-22 RX ORDER — HYDROCHLOROTHIAZIDE 12.5 MG/1
CAPSULE ORAL
Qty: 30 CAPSULE | Refills: 1 | Status: SHIPPED | OUTPATIENT
Start: 2018-05-22 | End: 2018-07-30 | Stop reason: SDUPTHER

## 2018-05-31 ENCOUNTER — PATIENT OUTREACH (OUTPATIENT)
Dept: OTHER | Facility: OTHER | Age: 58
End: 2018-05-31

## 2018-05-31 NOTE — PROGRESS NOTES
Last 5 Patient Entered Readings                                      Current 30 Day Average: 133/77     Recent Readings 5/31/2018 5/30/2018 5/29/2018 5/28/2018 5/27/2018    SBP (mmHg) 115 141 130 125 134    DBP (mmHg) 72 74 79 73 69    Pulse 68 63 60 67 64          Digital Medicine: Health  Follow Up    Lifestyle Modifications:    1.Dietary Modifications (Sodium intake <2,000mg/day, food labels, dining out):  Reports they recently returned from vacation, and that she noticed that with increasing sodium it did affect her blood pressure. States she has resumed low sodium diet in addition to regular exercise, but that her vacation was extremely enjoyable. Praised patient for continued consistency and discussed weight loss progress. Patient states she'd like to set her next weight loss goal to be less than 180lbs, as she was close to this prior to vacation.     2.Physical Activity: Deferred.     3.Medication Therapy: Patient has been compliant with the medication regimen.    4.Patient has the following medication side effects/concerns: No concerns per patient.   (Frequency/Alleviating factors/Precipitating factors, etc.)     Follow up with Evelin Brandie MARGOT Richmond completed. No further questions or concerns. Will continue follow up to achieve health goals.

## 2018-05-31 NOTE — PROGRESS NOTES
Last 5 Patient Entered Readings                                      Current 30 Day Average: 133/77     Recent Readings 5/31/2018 5/30/2018 5/29/2018 5/28/2018 5/27/2018    SBP (mmHg) 115 141 130 125 134    DBP (mmHg) 72 74 79 73 69    Pulse 68 63 60 67 64            Digital Medicine: Health  Follow Up    Left voicemail to follow up with Mrs. Brandie Richmond.  Current BP average 133/77 mmHg is not at goal, 130/80 mmHg. FU with patient on weight loss goal to re-adjust.

## 2018-06-04 ENCOUNTER — OFFICE VISIT (OUTPATIENT)
Dept: FAMILY MEDICINE | Facility: CLINIC | Age: 58
End: 2018-06-04
Payer: COMMERCIAL

## 2018-06-04 VITALS
SYSTOLIC BLOOD PRESSURE: 148 MMHG | HEIGHT: 66 IN | DIASTOLIC BLOOD PRESSURE: 82 MMHG | HEART RATE: 64 BPM | TEMPERATURE: 98 F | RESPIRATION RATE: 16 BRPM | WEIGHT: 186.31 LBS | BODY MASS INDEX: 29.94 KG/M2

## 2018-06-04 DIAGNOSIS — I10 ESSENTIAL HYPERTENSION: ICD-10-CM

## 2018-06-04 DIAGNOSIS — Z00.00 ROUTINE GENERAL MEDICAL EXAMINATION AT A HEALTH CARE FACILITY: Primary | ICD-10-CM

## 2018-06-04 DIAGNOSIS — R16.0 HEPATOMEGALY: ICD-10-CM

## 2018-06-04 DIAGNOSIS — H91.90 HEARING LOSS, UNSPECIFIED HEARING LOSS TYPE, UNSPECIFIED LATERALITY: ICD-10-CM

## 2018-06-04 DIAGNOSIS — N28.1 RENAL CYST: ICD-10-CM

## 2018-06-04 DIAGNOSIS — M35.3 PMR (POLYMYALGIA RHEUMATICA): ICD-10-CM

## 2018-06-04 PROBLEM — M79.673 PAIN OF FOOT: Status: RESOLVED | Noted: 2017-06-01 | Resolved: 2018-06-04

## 2018-06-04 PROBLEM — R09.A2 SENSATION OF LUMP IN THROAT: Status: RESOLVED | Noted: 2017-06-01 | Resolved: 2018-06-04

## 2018-06-04 PROCEDURE — 99396 PREV VISIT EST AGE 40-64: CPT | Mod: S$GLB,,, | Performed by: FAMILY MEDICINE

## 2018-06-04 PROCEDURE — 3077F SYST BP >= 140 MM HG: CPT | Mod: CPTII,S$GLB,, | Performed by: FAMILY MEDICINE

## 2018-06-04 PROCEDURE — 3079F DIAST BP 80-89 MM HG: CPT | Mod: CPTII,S$GLB,, | Performed by: FAMILY MEDICINE

## 2018-06-04 PROCEDURE — 99999 PR PBB SHADOW E&M-EST. PATIENT-LVL III: CPT | Mod: PBBFAC,,, | Performed by: FAMILY MEDICINE

## 2018-06-04 NOTE — PROGRESS NOTES
Subjective:       Patient ID: Brandie Richmond is a 57 y.o. female.    Chief Complaint: Establish Care      Brandie Richmond is in the office to establish care, former pt of Berny.    HPI  No updates to medical hx.  Past Medical History:   Diagnosis Date    Abnormal Pap smear of cervix     ascus 2013    Dizziness     DJD (degenerative joint disease) of cervical spine     DJD (degenerative joint disease) of cervical spine     Hemorrhoids     HTN (hypertension)     Hypothyroidism     Polyarthritis rheumatica 2016       Current Outpatient Prescriptions:     ascorbic acid, vitamin C, (VITAMIN C) 100 MG tablet, Take 600 mg by mouth once daily., Disp: , Rfl:     calcium-vitamin D3 (CALCIUM 500 + D) 500 mg(1,250mg) -200 unit per tablet, Take 1 tablet by mouth 2 (two) times daily with meals., Disp: , Rfl:     CHROMIUM ORAL, Take by mouth once daily., Disp: , Rfl:     diltiaZEM (CARDIZEM CD) 240 MG 24 hr capsule, Take 1 capsule (240 mg total) by mouth every evening., Disp: 90 capsule, Rfl: 3    fish oil-omega-3 fatty acids 300-1,000 mg capsule, Take by mouth once daily., Disp: , Rfl:     folic acid (FOLVITE) 1 MG tablet, 1 TABLET BY MOUTH ONCE A DAY., Disp: , Rfl: 3    hydroCHLOROthiazide (MICROZIDE) 12.5 mg capsule, TAKE 1 CAPSULE(12.5 MG) BY MOUTH EVERY DAY, Disp: 30 capsule, Rfl: 1    ketoconazole (NIZORAL) 2 % shampoo, Wash hair with medicated shampoo at least 2x/week - let sit on scalp at least 5 minutes prior to rinsing, Disp: 120 mL, Rfl: 5    Lactobacillus rhamnosus GG (CULTURELLE) 10 billion cell capsule, Take 1 capsule by mouth once daily., Disp: , Rfl:     methotrexate 2.5 MG Tab, 4 TABS BY MOUTH ONCE WEEKLY ALL AT THE SAME TIME., Disp: , Rfl: 3    multivitamin (THERAGRAN) per tablet, Take 1 tablet by mouth once daily., Disp: , Rfl:     predniSONE (DELTASONE) 1 MG tablet, , Disp: , Rfl:     predniSONE (DELTASONE) 10 MG tablet, Take 5 mg by mouth once daily. , Disp: , Rfl:     selenium 50 mcg  Tab, Take 50 mcg by mouth once daily., Disp: , Rfl:     SYNTHROID 50 mcg tablet, TAKE 1 TABLET BY MOUTH EVERY MONDAY, TUESDAY, THURSDAY,FRIDAY,AND SUNDAY, Disp: 30 tablet, Rfl: 11    SYNTHROID 75 mcg tablet, TAKE 1 TABLET BY MOUTH TWICE A WEEK., Disp: 30 tablet, Rfl: 9    vitamin E 100 UNIT capsule, Take 100 Units by mouth once daily., Disp: , Rfl:     CALCIUM-MAGNESIUM-VITAMIN D2 ORAL, Take by mouth once daily., Disp: , Rfl:     cloNIDine (CATAPRES) 0.1 MG tablet, Take 1 tablet (0.1 mg total) by mouth 3 (three) times daily as needed (PRN SBP > 165 mmHg)., Disp: 90 tablet, Rfl: 6    nystatin (MYCOSTATIN) cream, Apply topically 2 (two) times daily., Disp: 15 g, Rfl: 0    The 10-year ASCVD risk score (Stas VAZQUEZ Jr., et al., 2013) is: 4.2%    Values used to calculate the score:      Age: 57 years      Sex: Female      Is Non- : No      Diabetic: No      Tobacco smoker: No      Systolic Blood Pressure: 148 mmHg      Is BP treated: Yes      HDL Cholesterol: 70 mg/dL      Total Cholesterol: 211 mg/dL     Lab Results   Component Value Date    HGBA1C 5.4 11/02/2016     Lab Results   Component Value Date    LDLCALC 108.6 05/11/2018    CREATININE 0.9 05/11/2018   Labs 2017 rev.  Thyroid us rev.  abd us rev.    Review of Systems   Constitutional: Negative for fatigue and unexpected weight change.   HENT: Positive for congestion and hearing loss (doesn't hear well in crowds). Negative for postnasal drip, rhinorrhea, sore throat and trouble swallowing.    Eyes: Negative for photophobia and visual disturbance.   Respiratory: Negative for cough and shortness of breath.    Cardiovascular: Negative for chest pain, palpitations and leg swelling.   Gastrointestinal: Negative for blood in stool, constipation, diarrhea and nausea.        +hem, no gerd c/o   Genitourinary: Positive for frequency. Negative for difficulty urinating and dysuria.   Musculoskeletal: Negative for arthralgias and myalgias.         +PMR   Skin: Negative for color change and rash.   Neurological: Negative for dizziness, light-headedness and headaches (frontal discomfort).   Psychiatric/Behavioral: Negative for dysphoric mood and sleep disturbance.       Objective:      Physical Exam   Constitutional: She is oriented to person, place, and time. She appears well-developed and well-nourished. No distress.   HENT:   Head: Normocephalic and atraumatic.   Right Ear: External ear normal.   Left Ear: External ear normal.   Nose: Nose normal.   Mouth/Throat: Oropharynx is clear and moist. No oropharyngeal exudate.   Eyes: Conjunctivae and EOM are normal. Pupils are equal, round, and reactive to light.   Neck: Normal range of motion. Neck supple. No thyromegaly present.   Cardiovascular: Normal rate and regular rhythm.    Pulmonary/Chest: Effort normal and breath sounds normal. No respiratory distress. She has no wheezes.   Abdominal: Soft. Bowel sounds are normal. She exhibits no distension and no mass. There is no tenderness. There is no rebound and no guarding.   Musculoskeletal: Normal range of motion. She exhibits no edema or deformity (early bunion, R).   Lymphadenopathy:     She has no cervical adenopathy.   Neurological: She is alert and oriented to person, place, and time. She displays normal reflexes. No cranial nerve deficit.   Skin: Skin is warm and dry.   Psychiatric: She has a normal mood and affect. Her behavior is normal.   Nursing note and vitals reviewed.      Screening recommendations appropriate to age and health status were reviewed.    Routine general medical examination at a health care facility  -     CBC auto differential; Future; Expected date: 06/04/2018  -     Comprehensive metabolic panel; Future; Expected date: 06/04/2018  -     Hemoglobin A1c; Future; Expected date: 06/04/2018  -     Lipid panel; Future; Expected date: 06/04/2018  -     TSH; Future; Expected date: 06/04/2018  -     Vitamin D; Future; Expected date:  06/04/2018  -     Vitamin B12; Future; Expected date: 06/04/2018  Anticipatory guidance reviewed.  Hepatomegaly  -     US Abdomen Complete; Future; Expected date: 06/04/2018  Cont monitoring.  Renal cyst  -     US Abdomen Complete; Future; Expected date: 06/04/2018  Incidental finding on prev. Will f/u sizing.  PMR (polymyalgia rheumatica)  Per rheum/pounds. Cont pred taper.  Essential hypertension  Variable digital HTN readings.  Hearing loss, unspecified hearing loss type, unspecified laterality  -     Ambulatory consult to Audiology  For review.

## 2018-06-05 ENCOUNTER — PATIENT OUTREACH (OUTPATIENT)
Dept: OTHER | Facility: OTHER | Age: 58
End: 2018-06-05

## 2018-06-05 ENCOUNTER — HOSPITAL ENCOUNTER (OUTPATIENT)
Dept: RADIOLOGY | Facility: HOSPITAL | Age: 58
Discharge: HOME | End: 2018-06-05
Attending: FAMILY MEDICINE
Payer: COMMERCIAL

## 2018-06-05 ENCOUNTER — TELEPHONE (OUTPATIENT)
Dept: FAMILY MEDICINE | Facility: CLINIC | Age: 58
End: 2018-06-05

## 2018-06-05 DIAGNOSIS — N28.1 RENAL CYST: ICD-10-CM

## 2018-06-05 DIAGNOSIS — R16.0 HEPATOMEGALY: ICD-10-CM

## 2018-06-05 DIAGNOSIS — N28.1 RENAL CYST: Primary | ICD-10-CM

## 2018-06-05 PROCEDURE — 76700 US EXAM ABDOM COMPLETE: CPT | Mod: 26,,, | Performed by: RADIOLOGY

## 2018-06-05 PROCEDURE — 76700 US EXAM ABDOM COMPLETE: CPT | Mod: TC,PO

## 2018-06-05 NOTE — TELEPHONE ENCOUNTER
Renal cyst we were monitoring on us has increased in size slightly. Recommend f/u in nephrology to review. Referral done.

## 2018-06-05 NOTE — PROGRESS NOTES
Last 5 Patient Entered Readings                                      Current 30 Day Average: 136/77     Recent Readings 6/5/2018 6/1/2018 5/31/2018 5/30/2018 5/29/2018    SBP (mmHg) 138 118 115 141 130    DBP (mmHg) 80 68 72 74 79    Pulse 66 59 68 63 60          Patient's BP average is above goal of <130/80.     Patient denies s/s of hypotension (lightheadedness, dizziness, nausea, fatigue) associated with low readings. Instructed patient to inform me if this occurs, patient confirms understanding.      Patient denies s/s of hypertension (SOB, CP, severe headaches, changes in vision) associated with high readings. Instructed patient to go to the ED if BP > 180/110 and accompanied by hypertensive s/s, patient confirms understanding.    Will continue to monitor regularly. Will follow up in 2-3 weeks, sooner if BP begins to trend upward or downward.    Patient has my contact information and knows to call with any concerns or clinical changes.     Current HTN regimen:  Hypertension Medications             cloNIDine (CATAPRES) 0.1 MG tablet Take 1 tablet (0.1 mg total) by mouth 3 (three) times daily as needed (PRN SBP > 165 mmHg).    diltiaZEM (CARDIZEM CD) 240 MG 24 hr capsule Take 1 capsule (240 mg total) by mouth every evening.    hydroCHLOROthiazide (MICROZIDE) 12.5 mg capsule TAKE 1 CAPSULE(12.5 MG) BY MOUTH EVERY DAY        Patient reports sodium intake has been higher since vacation. She is trying to get back into routine of meal planning/prep and regular exercise. Also reports been busy at work, trying to catch up from vacation. Encouraged resumption of healthy lifestyle modifications.    6/26 - health  left voicemail today.

## 2018-06-05 NOTE — TELEPHONE ENCOUNTER
Spoke with pt and informed her of results and that a referral has been placed to see nephrology.     Pt verbally understands and will call to schedule with nephrology

## 2018-06-07 ENCOUNTER — OFFICE VISIT (OUTPATIENT)
Dept: NEPHROLOGY | Facility: CLINIC | Age: 58
End: 2018-06-07
Payer: COMMERCIAL

## 2018-06-07 ENCOUNTER — PATIENT MESSAGE (OUTPATIENT)
Dept: NEPHROLOGY | Facility: CLINIC | Age: 58
End: 2018-06-07

## 2018-06-07 VITALS
WEIGHT: 186.31 LBS | DIASTOLIC BLOOD PRESSURE: 76 MMHG | BODY MASS INDEX: 29.94 KG/M2 | HEIGHT: 66 IN | OXYGEN SATURATION: 98 % | HEART RATE: 74 BPM | SYSTOLIC BLOOD PRESSURE: 128 MMHG

## 2018-06-07 DIAGNOSIS — N28.1 RENAL CYST: Primary | ICD-10-CM

## 2018-06-07 PROCEDURE — 99244 OFF/OP CNSLTJ NEW/EST MOD 40: CPT | Mod: S$GLB,,, | Performed by: INTERNAL MEDICINE

## 2018-06-07 PROCEDURE — 99999 PR PBB SHADOW E&M-EST. PATIENT-LVL III: CPT | Mod: PBBFAC,,, | Performed by: INTERNAL MEDICINE

## 2018-06-07 NOTE — LETTER
June 7, 2018      Amie Dupree MD  [address]  [place] [address]           [facility] [address] [facility] [address] [place] [address]  Phone: [phone]          Patient: Brandie Richmond   MR Number: [id number]   YOB: 1960   Date of Visit: 6/7/2018       Dear Dr. Amie Dupree:    Thank you for referring Brandie Richmond to me for evaluation. Attached you will find relevant portions of my assessment and plan of care.    If you have questions, please do not hesitate to call me. I look forward to following Brandie Richmond along with you.    Sincerely,    Chase Faustin MD    Enclosure  CC:  No Recipients    If you would like to receive this communication electronically, please contact [email or web address] or [phone] to request more information on [employer] access.    For providers and/or their staff who would like to refer a patient to [facility], please contact us through our one-stop-shop provider referral line, [facility], at [phone].    If you feel you have received this communication in error or would no longer like to receive these types of communications, please e-mail [email or web address]

## 2018-06-07 NOTE — PROGRESS NOTES
Subjective:       Patient ID: Brandie Richmond is a 57 y.o. White female who presents for new patient evaluation for renal cyst.    Brandie Richmond is referred by Amie Dupree MD to be evaluated for renal cyst.  She was found to have a renal cyst that is enlarged from what it was for her last renal US.  She is on prednisone and methotrexate for her PMR.  She reports she had an ACL repair last year but has had no procedures since then.  Her blood pressure is controlled at home.      Review of Systems   Constitutional: Negative for appetite change, chills and fever.   Eyes: Negative for visual disturbance.   Respiratory: Negative for cough and shortness of breath.    Cardiovascular: Negative for chest pain and leg swelling.   Gastrointestinal: Negative for diarrhea, nausea and vomiting.   Genitourinary: Negative for difficulty urinating, dysuria and hematuria.   Musculoskeletal: Positive for arthralgias. Negative for myalgias.   Skin: Negative for rash.   Neurological: Positive for headaches.   Psychiatric/Behavioral: Negative for sleep disturbance.       The past medical, family and social histories were reviewed for this encounter.     Past Medical History:   Diagnosis Date    Abnormal Pap smear of cervix     ascus     Cataract     Dizziness     DJD (degenerative joint disease) of cervical spine     DJD (degenerative joint disease) of cervical spine     Hemorrhoids     HTN (hypertension)     Hypothyroidism     Polyarthritis rheumatica 2016     Past Surgical History:   Procedure Laterality Date    cervical fusion       SECTION      COLONOSCOPY      DEXA      ,     KNEE ARTHROSCOPY W/ ACL RECONSTRUCTION Left     RHINOPLASTY TIP       Social History     Social History    Marital status:      Spouse name: N/A    Number of children: 1    Years of education: N/A     Occupational History     [employer]     Social History Main Topics    Smoking status: Never  "Smoker    Smokeless tobacco: Never Used    Alcohol use 1.0 oz/week     2 Standard drinks or equivalent per week      Comment: less frequent on mtx    Drug use: No    Sexual activity: Yes     Partners: Male     Birth control/ protection: None, Post-menopausal     Other Topics Concern    Not on file     Social History Narrative    Works as an , originally from .     Current Outpatient Prescriptions   Medication Sig    ascorbic acid, vitamin C, (VITAMIN C) 100 MG tablet Take 600 mg by mouth once daily.    CALCIUM-MAGNESIUM-VITAMIN D2 ORAL Take by mouth once daily.    calcium-vitamin D3 (CALCIUM 500 + D) 500 mg(1,250mg) -200 unit per tablet Take 1 tablet by mouth 2 (two) times daily with meals.    diltiaZEM (CARDIZEM CD) 240 MG 24 hr capsule Take 1 capsule (240 mg total) by mouth every evening.    fish oil-omega-3 fatty acids 300-1,000 mg capsule Take by mouth once daily.    folic acid (FOLVITE) 1 MG tablet 1 TABLET BY MOUTH ONCE A DAY.    hydroCHLOROthiazide (MICROZIDE) 12.5 mg capsule TAKE 1 CAPSULE(12.5 MG) BY MOUTH EVERY DAY    Lactobacillus rhamnosus GG (CULTURELLE) 10 billion cell capsule Take 1 capsule by mouth once daily.    methotrexate 2.5 MG Tab 4 TABS BY MOUTH ONCE WEEKLY ALL AT THE SAME TIME.    multivitamin (THERAGRAN) per tablet Take 1 tablet by mouth once daily.    predniSONE (DELTASONE) 1 MG tablet Currently on 8mg once daily.  On "step-down" program.  Plan is to decrease 1mg monthly.    selenium 50 mcg Tab Take 50 mcg by mouth once daily.    SYNTHROID 50 mcg tablet TAKE 1 TABLET BY MOUTH EVERY MONDAY, TUESDAY, THURSDAY,FRIDAY,AND SUNDAY    SYNTHROID 75 mcg tablet TAKE 1 TABLET BY MOUTH TWICE A WEEK.    vitamin E 100 UNIT capsule Take 100 Units by mouth once daily.    CHROMIUM ORAL Take by mouth once daily.    cloNIDine (CATAPRES) 0.1 MG tablet Take 1 tablet (0.1 mg total) by mouth 3 (three) times daily as needed (PRN SBP > 165 mmHg).    ketoconazole (NIZORAL) 2 % " "shampoo Wash hair with medicated shampoo at least 2x/week - let sit on scalp at least 5 minutes prior to rinsing    nystatin (MYCOSTATIN) cream Apply topically 2 (two) times daily.     No current facility-administered medications for this visit.        /76   Pulse 74   Ht 5' 6" (1.676 m)   Wt 84.5 kg (186 lb 4.6 oz)   LMP 12/06/2013   SpO2 98%   BMI 30.07 kg/m²     Objective:      Physical Exam   Constitutional: She is oriented to person, place, and time. She appears well-developed and well-nourished. No distress.   HENT:   Head: Normocephalic and atraumatic.   Eyes: Conjunctivae are normal.   Neck: Neck supple. No JVD present.   Cardiovascular: Normal rate, regular rhythm and normal heart sounds.  Exam reveals no gallop and no friction rub.    No murmur heard.  Pulmonary/Chest: Effort normal and breath sounds normal. No respiratory distress. She has no wheezes. She has no rales.   Abdominal: Soft. Bowel sounds are normal. She exhibits no distension. There is no tenderness.   Musculoskeletal: She exhibits no edema.   Neurological: She is alert and oriented to person, place, and time.   Skin: Skin is warm and dry. No rash noted.   Psychiatric: She has a normal mood and affect.   Vitals reviewed.      Assessment:       1. Renal cyst        Plan:   Return to clinic in 18 months.  Labs for next visit include rp and renal US.  Baseline creatinine is 0.8.  I reassured her regarding her renal cyst.  Continue current medications as prescribed and reviewed.   Blood pressure is controlled on the current regimen.        "

## 2018-06-26 ENCOUNTER — PATIENT OUTREACH (OUTPATIENT)
Dept: OTHER | Facility: OTHER | Age: 58
End: 2018-06-26

## 2018-06-26 NOTE — PROGRESS NOTES
Last 5 Patient Entered Readings                                      Current 30 Day Average: 131/75     Recent Readings 6/23/2018 6/22/2018 6/21/2018 6/19/2018 6/18/2018    SBP (mmHg) 142 129 127 134 138    DBP (mmHg) 82 80 82 70 74    Pulse 67 60 68 64 57            Digital Medicine: Health  Follow Up    Left voicemail to follow up with Mrs. Brandie Richmond.  Current BP average 131/75 mmHg is slightly above goal, 130/80 mmHg. FU regarding weight loss progress and goal of getting back into routine after returning from vacation.

## 2018-07-03 ENCOUNTER — PATIENT OUTREACH (OUTPATIENT)
Dept: OTHER | Facility: OTHER | Age: 58
End: 2018-07-03

## 2018-07-03 NOTE — PROGRESS NOTES
Last 5 Patient Entered Readings                                      Current 30 Day Average: 130/76     Recent Readings 7/3/2018 7/2/2018 7/1/2018 6/30/2018 6/29/2018    SBP (mmHg) 127 131 134 117 126    DBP (mmHg) 73 77 77 71 78    Pulse 65 63 59 63 64          Sent [facility] message to follow up with MsEvelin Brandie Richmond.    Per 30 day average, blood pressure is well controlled 130/76 mmHg. Encouraged adherence to low sodium diet and physical activity guidelines. Advised patient to call or message with questions or concerns. Follow up in 8 weeks. Advised can decrease to 3 to 4 BP readings per week.    Current HTN regimen:  Hypertension Medications             cloNIDine (CATAPRES) 0.1 MG tablet Take 1 tablet (0.1 mg total) by mouth 3 (three) times daily as needed (PRN SBP > 165 mmHg).    diltiaZEM (CARDIZEM CD) 240 MG 24 hr capsule Take 1 capsule (240 mg total) by mouth every evening.    hydroCHLOROthiazide (MICROZIDE) 12.5 mg capsule TAKE 1 CAPSULE(12.5 MG) BY MOUTH EVERY DAY

## 2018-07-18 NOTE — PROGRESS NOTES
"Last 5 Patient Entered Readings                                      Current 30 Day Average: 131/77     Recent Readings 7/11/2018 7/9/2018 7/4/2018 7/4/2018 7/3/2018    SBP (mmHg) 146 142 128 122 127    DBP (mmHg) 77 82 73 90 73    Pulse 63 60 54 60 65            Digital Medicine: Health  Follow Up    Left voicemail to follow up with Mrs. Brandie Richmond.  Current BP average 131/77 mmHg is not at goal 130/80 mmHg however is very close. FU regarding goal to "get back on track" regarding regular exercise and nutrition after vacation. Patient has had a couple higher readings than normal on 7/11 and 7/9 - check in regarding these.          "

## 2018-07-24 ENCOUNTER — PATIENT MESSAGE (OUTPATIENT)
Dept: ADMINISTRATIVE | Facility: OTHER | Age: 58
End: 2018-07-24

## 2018-07-25 NOTE — PROGRESS NOTES
"Last 5 Patient Entered Readings                                      Current 30 Day Average: 128/77     Recent Readings 7/25/2018 7/24/2018 7/23/2018 7/22/2018 7/21/2018    SBP (mmHg) 129 135 136 128 104    DBP (mmHg) 73 74 81 80 81    Pulse 67 64 70 70 63            Digital Medicine: Health  Follow Up    Lifestyle Modifications:    1.Dietary Modifications (Sodium intake <2,000mg/day, food labels, dining out): Patient states she returned from vacation yesterday and is "getting back on track" at her first day back to work today. Reports she feels confident in returning to normal routine with low sodium and high vegetable intake. Encouraged to continue as patient continues to have overall goal of working towards less than 180 lbs.     2.Physical Activity: Deferred.     3.Medication Therapy: Patient has been compliant with the medication regimen.    4.Patient has the following medication side effects/concerns: No concerns/ side effects per patient.   (Frequency/Alleviating factors/Precipitating factors, etc.)     Follow up with  Brandie Richmond completed. No further questions or concerns. Will continue follow up to achieve health goals. Patient continues to be independently motivated and is actively working towards goal of weight loss and continued blood pressure management.             "

## 2018-07-30 DIAGNOSIS — I10 HYPERTENSION, UNSPECIFIED TYPE: ICD-10-CM

## 2018-07-31 RX ORDER — HYDROCHLOROTHIAZIDE 12.5 MG/1
CAPSULE ORAL
Qty: 30 CAPSULE | Refills: 0 | Status: SHIPPED | OUTPATIENT
Start: 2018-07-31 | End: 2018-08-27 | Stop reason: SDUPTHER

## 2018-08-20 ENCOUNTER — LAB VISIT (OUTPATIENT)
Dept: LAB | Facility: HOSPITAL | Age: 58
End: 2018-08-20
Attending: OTOLARYNGOLOGY
Payer: COMMERCIAL

## 2018-08-20 ENCOUNTER — OFFICE VISIT (OUTPATIENT)
Dept: OTOLARYNGOLOGY | Facility: CLINIC | Age: 58
End: 2018-08-20
Payer: COMMERCIAL

## 2018-08-20 VITALS — WEIGHT: 189.81 LBS | HEIGHT: 66 IN | BODY MASS INDEX: 30.51 KG/M2

## 2018-08-20 DIAGNOSIS — G44.89 CHRONIC MIXED HEADACHE SYNDROME: ICD-10-CM

## 2018-08-20 DIAGNOSIS — R43.8 HYPOSMIA: ICD-10-CM

## 2018-08-20 DIAGNOSIS — H53.9 VISION CHANGES: ICD-10-CM

## 2018-08-20 DIAGNOSIS — R43.8 HYPOSMIA: Primary | ICD-10-CM

## 2018-08-20 LAB
CREAT SERPL-MCNC: 0.9 MG/DL
EST. GFR  (AFRICAN AMERICAN): >60 ML/MIN/1.73 M^2
EST. GFR  (NON AFRICAN AMERICAN): >60 ML/MIN/1.73 M^2

## 2018-08-20 PROCEDURE — 36415 COLL VENOUS BLD VENIPUNCTURE: CPT | Mod: PO

## 2018-08-20 PROCEDURE — 3008F BODY MASS INDEX DOCD: CPT | Mod: CPTII,S$GLB,, | Performed by: OTOLARYNGOLOGY

## 2018-08-20 PROCEDURE — 82565 ASSAY OF CREATININE: CPT

## 2018-08-20 PROCEDURE — 99999 PR PBB SHADOW E&M-EST. PATIENT-LVL IV: CPT | Mod: PBBFAC,,, | Performed by: OTOLARYNGOLOGY

## 2018-08-20 PROCEDURE — 99204 OFFICE O/P NEW MOD 45 MIN: CPT | Mod: S$GLB,,, | Performed by: OTOLARYNGOLOGY

## 2018-08-20 NOTE — PROGRESS NOTES
"Subjective:       Patient ID: Brandie Richmond is a 57 y.o. female.    Chief Complaint: Sinusitis and Headache    Brandie is here for headache. She has concerns for possible sinus issues. Symptoms have been present for years. Main complaint is headache. This has been fairly chronic for > 6 years. She also endorses diplopia described as a "ghosting" of her vision with some overlap of visual fields. This occurs looking in all movements. Headache is chronic, occurs all day, located forehead, ocular, and malar regions bilateral. These are the two main complaints. The only thing that improves the headaches are steroids. She was subsequently diagnosed with Polymyalgia rheumatica. She takes MTX 10 mg weekly and steroids, 17.5 mg Prednisone. Headache is throbbing.  Post-nasal drainage: no  Pressure: yes - she describes headaches which have been going back for 6 years.   Congestion: no  Decreased sense of smell: yes, slowly progressive.   Therapies tried: Nothing currently  Seasonal variation: no  She did have a cervical fusion a few years ago when this was thought to be related to the constellation of symptoms but this did not improve headaches.    Allergy testing: yes, remotely   History of asthma: no  Pertinent medical issues: none    SNOT-22=53    Social History     Tobacco Use   Smoking Status Never Smoker   Smokeless Tobacco Never Used     Social History     Substance and Sexual Activity   Alcohol Use Yes    Alcohol/week: 1.0 oz    Types: 2 Standard drinks or equivalent per week    Comment: less frequent on mtx        Review of Systems   Constitutional: Negative for activity change and appetite change.   Eyes: Negative for discharge.   Respiratory: Negative for difficulty breathing and wheezing   Cardiovascular: Negative for chest pain.   Gastrointestinal: Negative for abdominal distention and abdominal pain.   Endocrine: Negative for cold intolerance and heat intolerance.   Genitourinary: Negative for dysuria. "   Musculoskeletal: Negative for gait problem and joint swelling.   Skin: Negative for color change and pallor.   Neurological: Negative for syncope and weakness.   Psychiatric/Behavioral: Negative for agitation and confusion.     Objective:        Constitutional:   She is oriented to person, place, and time. She appears well-developed and well-nourished. She appears alert. She is active. Normal speech.      Head:  Normocephalic and atraumatic. Head is without TMJ tenderness. No scars. Salivary glands normal.  Facial strength is normal.      Ears:    Right Ear: No drainage or swelling. No middle ear effusion.   Left Ear: No drainage or swelling.  No middle ear effusion.     Nose:  No mucosal edema, rhinorrhea or sinus tenderness. No turbinate hypertrophy.      Mouth/Throat  Oropharynx clear and moist without lesions or asymmetry, normal uvula midline and mirror exam normal. Normal dentition. No uvula swelling, lacerations or trismus. No oropharyngeal exudate. Tonsillar erythema, tonsillar exudate.      Neck:  Full range of motion with neck supple and no adenopathy. Thyroid tenderness is present. No tracheal deviation, no edema, no erythema, normal range of motion, no stridor, no crepitus and no neck rigidity present. No thyroid mass present.     Cardiovascular:   Intact distal pulses and normal pulses.      Pulmonary/Chest:   Effort normal and breath sounds normal. No stridor.     Psychiatric:   Her speech is normal and behavior is normal. Her mood appears not anxious. Her affect is not labile.     Neurological:   She is alert and oriented to person, place, and time. No sensory deficit.     Skin:   No abrasions, lacerations, lesions, or rashes. No abrasion and no bruising noted.         Tests / Results:  MRI Brain WITHOUT contrast  Findings:  There are several punctate T2 hyperintense foci in the left cerebral basal ganglia region commensurate with the patient's age.  No significant abnormal signal abnormalities are  evident within the brain parenchyma.  There are no findings to suggest large   vessel infarction, recent ischemia, neoplasm, or hemorrhage.  The ventricular system is normal.  The temporal bone structures, orbits, and paranasal sinuses demonstrate no abnormalities.    I personally reviewed the CT Head 2016 and my findings reveal:   Paranasal sinuses clear     Assessment:       1. Hyposmia    2. Chronic mixed headache syndrome    3. Vision changes          Plan:       Given her symptoms, suspect a headache disorder, but need to rule out neoplastic cause given her slow hyposmia, visual change, and chronic headaches  Low suspicion for sinus / nasal cause  MRI Brain with and without contrast  Will call with results

## 2018-08-20 NOTE — LETTER
August 20, 2018      Amie Dupree MD  [address]  [facility] [address]           [place] [facility]  [address] [facility] [address] [place] [address]  Phone: [phone]  Fax: [phone]          Patient: Brandie Richmond   MR Number: [id number]   YOB: 1960   Date of Visit: 8/20/2018       Dear Dr. Amie Dupree:    Thank you for referring Brandie Richmond to me for evaluation. Attached you will find relevant portions of my assessment and plan of care.    If you have questions, please do not hesitate to call me. I look forward to following Brandie Richmond along with you.    Sincerely,    Christian Philip MD    Enclosure  CC:  No Recipients    If you would like to receive this communication electronically, please contact [email or web address] or [phone] to request more information on [employer] access.    For providers and/or their staff who would like to refer a patient to [facility], please contact us through our one-stop-shop provider referral line, [facility], at [phone].    If you feel you have received this communication in error or would no longer like to receive these types of communications, please e-mail [email or web address]

## 2018-08-21 ENCOUNTER — OFFICE VISIT (OUTPATIENT)
Dept: DERMATOLOGY | Facility: CLINIC | Age: 58
End: 2018-08-21
Payer: COMMERCIAL

## 2018-08-21 VITALS — WEIGHT: 189 LBS | RESPIRATION RATE: 18 BRPM | HEIGHT: 66 IN | BODY MASS INDEX: 30.37 KG/M2

## 2018-08-21 DIAGNOSIS — L60.3 ONYCHOSCHIZIA: Primary | ICD-10-CM

## 2018-08-21 DIAGNOSIS — Z12.83 SKIN CANCER SCREENING: ICD-10-CM

## 2018-08-21 DIAGNOSIS — L82.1 SEBORRHEIC KERATOSES: ICD-10-CM

## 2018-08-21 DIAGNOSIS — L72.0 MILIA: ICD-10-CM

## 2018-08-21 PROCEDURE — 99999 PR PBB SHADOW E&M-EST. PATIENT-LVL II: CPT | Mod: PBBFAC,,, | Performed by: DERMATOLOGY

## 2018-08-21 PROCEDURE — 3008F BODY MASS INDEX DOCD: CPT | Mod: CPTII,S$GLB,, | Performed by: DERMATOLOGY

## 2018-08-21 PROCEDURE — 99214 OFFICE O/P EST MOD 30 MIN: CPT | Mod: S$GLB,,, | Performed by: DERMATOLOGY

## 2018-08-21 NOTE — PROGRESS NOTES
Subjective:       Patient ID:  Brandie Richmond is a 57 y.o. female who presents for   Chief Complaint   Patient presents with    Follow-up    Skin Check    Lesion     left face    Nail Problem     toenails & fingernails - splitting     Last seen 1-16-17   Skin check today   Lesion Left face - slightly raised approx 2 months . No tx, asx  Fingernails  splitting approx 8 months . Treats w/ OTC nail creams & oils . No improvement   History thyroid disease on synthroid  Upcoming MRI for evaluation new onset blurred vision and headaches- 3 weeks      Phx Seborrheic dermatitis  -     ketoconazole (NIZORAL) 2 % shampoo    Phx Onychomycosis  negative fungal culture in past  Dc'dtavaborole (KERYDIN) 5 % Marina July 2016 - toe nails have improved      No phx skin ca  Father had skin ca - type unknown         Review of Systems   Skin: Positive for activity-related sunscreen use. Negative for daily sunscreen use, sensitivity to antibiotic ointment, sensitivity to bandage adhesive, tendency to form keloidal scars and recent sunburn.   Hematologic/Lymphatic: Bruises/bleeds easily.        Objective:    Physical Exam   Constitutional: She appears well-developed and well-nourished. No distress.   HENT:   Mouth/Throat: Lips normal.    Eyes: Lids are normal.  No conjunctival no injection.   Cardiovascular: There is no local extremity swelling and no dependent edema.     Neurological: She is alert and oriented to person, place, and time. She is not disoriented.   Psychiatric: She has a normal mood and affect.   Skin:   Areas Examined (abnormalities noted in diagram):   Head / Face Inspection Performed  Neck Inspection Performed  Chest / Axilla Inspection Performed  Abdomen Inspection Performed  Back Inspection Performed  RUE Inspected  LUE Inspection Performed  RLE Inspected  LLE Inspection Performed  Nails and Digits Inspection Performed                   Diagram Legend     Erythematous scaling macule/papule c/w actinic keratosis        Vascular papule c/w angioma      Pigmented verrucoid papule/plaque c/w seborrheic keratosis      Yellow umbilicated papule c/w sebaceous hyperplasia      Irregularly shaped tan macule c/w lentigo     1-2 mm smooth white papules consistent with Milia      Movable subcutaneous cyst with punctum c/w epidermal inclusion cyst      Subcutaneous movable cyst c/w pilar cyst      Firm pink to brown papule c/w dermatofibroma      Pedunculated fleshy papule(s) c/w skin tag(s)      Evenly pigmented macule c/w junctional nevus     Mildly variegated pigmented, slightly irregular-bordered macule c/w mildly atypical nevus      Flesh colored to evenly pigmented papule c/w intradermal nevus       Pink pearly papule/plaque c/w basal cell carcinoma      Erythematous hyperkeratotic cursted plaque c/w SCC      Surgical scar with no sign of skin cancer recurrence      Open and closed comedones      Inflammatory papules and pustules      Verrucoid papule consistent consistent with wart     Erythematous eczematous patches and plaques     Dystrophic onycholytic nail with subungual debris c/w onychomycosis     Umbilicated papule    Erythematous-base heme-crusted tan verrucoid plaque consistent with inflamed seborrheic keratosis     Erythematous Silvery Scaling Plaque c/w Psoriasis     See annotation      Assessment / Plan:        Onychoschizia  Avoid chronic wetting of nails, wear gloves for all housework and food preparation  Keep nails trimmed  Avoid nail hardeners with formaldehyde  Discussed the following dry skin tips:    Avoid hot baths and showers, keep showers or baths brief (10-15 min), use a mild soap or cleanser, pat skin dry after showering and apply a moisturizer within 3 minutes of getting out of bath (cetaphil cream, ceraVe, aquaphor) and reapply moisturizers 2-4 times/day.         Skin cancer screening  Upper body skin examination performed today including at least 6 points as noted in physical examination. No lesions  suspicious for malignancy noted.        Milia, face  Reassurance given to patient. No treatment is necessary.   Treatment of benign, asymptomatic lesions may be considered cosmetic.      Seborrheic keratoses, trunk and extremities  These are benign inherited growths without a malignant potential. Reassurance given to patient. No treatment is necessary.                Follow-up in about 1 year (around 8/21/2019).

## 2018-08-22 ENCOUNTER — HOSPITAL ENCOUNTER (OUTPATIENT)
Dept: RADIOLOGY | Facility: HOSPITAL | Age: 58
Discharge: HOME | End: 2018-08-22
Attending: OTOLARYNGOLOGY
Payer: COMMERCIAL

## 2018-08-22 ENCOUNTER — PATIENT MESSAGE (OUTPATIENT)
Dept: OTOLARYNGOLOGY | Facility: CLINIC | Age: 58
End: 2018-08-22

## 2018-08-22 DIAGNOSIS — G44.89 CHRONIC MIXED HEADACHE SYNDROME: ICD-10-CM

## 2018-08-22 DIAGNOSIS — G44.52 HEADACHE, NEW DAILY PERSISTENT (NDPH): Primary | ICD-10-CM

## 2018-08-22 PROCEDURE — 70553 MRI BRAIN STEM W/O & W/DYE: CPT | Mod: 26,,, | Performed by: RADIOLOGY

## 2018-08-22 PROCEDURE — A9585 GADOBUTROL INJECTION: HCPCS | Mod: PO | Performed by: OTOLARYNGOLOGY

## 2018-08-22 PROCEDURE — 25500020 PHARM REV CODE 255: Mod: PO | Performed by: OTOLARYNGOLOGY

## 2018-08-22 PROCEDURE — 70553 MRI BRAIN STEM W/O & W/DYE: CPT | Mod: TC,PO

## 2018-08-22 RX ORDER — GADOBUTROL 604.72 MG/ML
8 INJECTION INTRAVENOUS
Status: COMPLETED | OUTPATIENT
Start: 2018-08-22 | End: 2018-08-22

## 2018-08-22 RX ADMIN — GADOBUTROL 8 ML: 604.72 INJECTION INTRAVENOUS at 09:08

## 2018-08-23 ENCOUNTER — PATIENT OUTREACH (OUTPATIENT)
Dept: OTHER | Facility: OTHER | Age: 58
End: 2018-08-23

## 2018-08-23 NOTE — PROGRESS NOTES
Last 5 Patient Entered Readings                                      Current 30 Day Average: 129/77     Recent Readings 8/23/2018 8/20/2018 8/17/2018 8/15/2018 8/13/2018    SBP (mmHg) 135 142 132 121 139    DBP (mmHg) 78 82 75 83 79    Pulse 63 66 67 62 63          Digital Medicine: Health  Follow Up    Lifestyle Modifications:    1.Dietary Modifications (Sodium intake <2,000mg/day, food labels, dining out): Deferred.     2.Physical Activity: Patient states she has not been as active due to headaches. Is planning to see a specialist to find a solution. Reports the headaches have occurred on and off for the past few years, but that after her most recent vacation is when this bout started. As a result, she has not been as active, but is taking steps to help resolve symptoms including following up with physicians and considering appointment with opthalmology.     3.Medication Therapy: Patient has been compliant with the medication regimen.    4.Patient has the following medication side effects/concerns: Patient reporting HA and some vision changes that started at the beginning of this month and is being followed for these concerns. Has noticed slight increase in systolic bp with headaches.      Follow up with Mrs. Brandie Richmond completed. No further questions or concerns. Will continue follow up to achieve health goals.

## 2018-08-27 ENCOUNTER — PATIENT MESSAGE (OUTPATIENT)
Dept: OTOLARYNGOLOGY | Facility: CLINIC | Age: 58
End: 2018-08-27

## 2018-08-27 DIAGNOSIS — I10 HYPERTENSION, UNSPECIFIED TYPE: ICD-10-CM

## 2018-08-27 RX ORDER — HYDROCHLOROTHIAZIDE 12.5 MG/1
CAPSULE ORAL
Qty: 30 CAPSULE | Refills: 0 | Status: SHIPPED | OUTPATIENT
Start: 2018-08-27 | End: 2018-09-26

## 2018-08-28 ENCOUNTER — PATIENT MESSAGE (OUTPATIENT)
Dept: SURGERY | Facility: OTHER | Age: 58
End: 2018-08-28

## 2018-08-28 NOTE — PROGRESS NOTES
Last 5 Patient Entered Readings                                      Current 30 Day Average: 129/77     Recent Readings 8/28/2018 8/24/2018 8/23/2018 8/20/2018 8/17/2018    SBP (mmHg) 138 126 135 142 132    DBP (mmHg) 74 78 78 82 75    Pulse 66 66 63 66 67          Chart reviewed. Recent increased BP readings likely related to headaches and decreased exercise. Patient being followed for headaches and possible temporary artery biopsy. No medication recommendations at this time.

## 2018-08-30 ENCOUNTER — HOSPITAL ENCOUNTER (OUTPATIENT)
Dept: PREADMISSION TESTING | Facility: OTHER | Age: 58
Discharge: HOME | End: 2018-08-30
Attending: SPECIALIST
Payer: COMMERCIAL

## 2018-08-30 ENCOUNTER — ANESTHESIA EVENT (OUTPATIENT)
Dept: SURGERY | Facility: OTHER | Age: 58
End: 2018-08-30
Payer: COMMERCIAL

## 2018-08-30 VITALS
HEIGHT: 66 IN | WEIGHT: 185 LBS | DIASTOLIC BLOOD PRESSURE: 65 MMHG | BODY MASS INDEX: 29.73 KG/M2 | HEART RATE: 66 BPM | SYSTOLIC BLOOD PRESSURE: 142 MMHG | TEMPERATURE: 99 F

## 2018-08-30 RX ORDER — LIDOCAINE HYDROCHLORIDE 10 MG/ML
0.5 INJECTION, SOLUTION EPIDURAL; INFILTRATION; INTRACAUDAL; PERINEURAL ONCE
Status: CANCELLED | OUTPATIENT
Start: 2018-08-30 | End: 2018-08-30

## 2018-08-30 RX ORDER — SODIUM CHLORIDE, SODIUM LACTATE, POTASSIUM CHLORIDE, CALCIUM CHLORIDE 600; 310; 30; 20 MG/100ML; MG/100ML; MG/100ML; MG/100ML
INJECTION, SOLUTION INTRAVENOUS CONTINUOUS
Status: CANCELLED | OUTPATIENT
Start: 2018-08-30

## 2018-08-30 NOTE — DISCHARGE INSTRUCTIONS
PRE-ADMIT TESTING -  [phone]    [address]  [facility]          Your surgery has been scheduled at [facility]. We are pleased to have the opportunity to serve you. For Further Information please call [phone].    On the day of surgery please report to the Information Desk on the 1st floor.    · CONTACT YOUR PHYSICIAN'S OFFICE THE DAY PRIOR TO YOUR SURGERY TO OBTAIN YOUR ARRIVAL TIME.     · The evening before surgery do not eat anything after 9 p.m. ( this includes hard candy, chewing gum and mints).  You may only have GATORADE, POWERADE AND WATER  from 9 p.m. until you leave your home.   DO NOT DRINK ANY LIQUIDS ON THE WAY TO THE HOSPITAL.      SPECIAL MEDICATION INSTRUCTIONS: TAKE medications checked off by the Anesthesiologist on your Medication List.    Angiogram Patients: Take medications as instructed by your physician, including aspirin.     Surgery Patients:    If you take ASPIRIN - Your PHYSICIAN/SURGEON will need to inform you IF/OR when you need to stop taking aspirin prior to your surgery.     Do Not take any medications containing IBUPROFEN.  Do Not Wear any make-up or dark nail polish   (especially eye make-up) to surgery. If you come to surgery with makeup on you will be required to remove the makeup or nail polish.    Do not shave your surgical area at least 5 days prior to your surgery. The surgical prep will be performed at the hospital according to Infection Control regulations.    Leave all valuables at home.   Do Not wear any jewelry or watches, including any metal in body piercings.  Contact Lens must be removed before surgery. Either do not wear the contact lens or bring a case and solution for storage.  Please bring a container for eyeglasses or dentures as required.  Bring any paperwork your physician has provided, such as consent forms,  history and physicals, doctor's orders, etc.   Bring comfortable clothes that are loose fitting to wear upon  discharge. Take into consideration the type of surgery being performed.  Maintain your diet as advised per your physician the day prior to surgery.      Adequate rest the night before surgery is advised.   Park in the Parking lot behind the hospital or in the [facility] across the street from the parking lot. Parking is complimentary.  If you will be discharged the same day as your procedure, please arrange for a responsible adult to drive you home or to accompany you if traveling by taxi.   YOU WILL NOT BE PERMITTED TO DRIVE OR TO LEAVE THE HOSPITAL ALONE AFTER SURGERY.   It is strongly recommended that you arrange for someone to remain with you for the first 24 hrs following your surgery.       Thank you for your cooperation.  The Staff of [facility].        Bathing Instructions                                                                 Please shower the evening before and morning of your procedure with    ANTIBACTERIAL SOAP. ( DIAL, etc )  Concentrate on the surgical area   for at least 3 minutes and rinse completely. Dry off as usual.   Do not use any deodorant, powder, body lotions, perfume, after shave or    cologne.

## 2018-08-30 NOTE — ANESTHESIA PREPROCEDURE EVALUATION
08/30/2018  Brandie Acevedo is a 57 y.o., female.    Anesthesia Evaluation    I have reviewed the Patient Summary Reports.    I have reviewed the Nursing Notes.   I have reviewed the Medications.   Prednisone    Review of Systems  Anesthesia Hx:  Denies Family Hx of Anesthesia complications.   Denies Personal Hx of Anesthesia complications.   Cardiovascular:   Hypertension    Musculoskeletal:   Arthritis (polyarthritis rheumatica)  S/p ACF Spine Disorders: cervical Degenerative disease    Neurological:   Headaches (r/o arteritis) polymyalgia   Endocrine:   Hypothyroidism        Physical Exam  General:  Well nourished      Dental:  Dental Findings: In tact        Mental Status:  Mental Status Findings:  Cooperative, Alert and Oriented         Anesthesia Plan  Type of Anesthesia, risks & benefits discussed:  Anesthesia Type:  MAC  Patient's Preference:   Intra-op Monitoring Plan: standard ASA monitors  Intra-op Monitoring Plan Comments:   Post Op Pain Control Plan: multimodal analgesia  Post Op Pain Control Plan Comments:   Induction:   IV  Beta Blocker:         Informed Consent: Patient understands risks and agrees with Anesthesia plan.  Questions answered. Anesthesia consent signed with patient.  ASA Score: 3     Day of Surgery Review of History & Physical:    H&P update referred to the surgeon.     Anesthesia Plan Notes: On Prednisone for polymyalgia rheumatica for 2 years, dose increased to 40mg daily for possible arteritis causing HA's  Pt states unable to take prednisone on an empty stomach, ordered hydrocortisone IV, will take prednisone later in the day    Recent labs in [facility]            Ready For Surgery From Anesthesia Perspective.

## 2018-08-31 ENCOUNTER — HOSPITAL ENCOUNTER (OUTPATIENT)
Facility: OTHER | Age: 58
Discharge: HOME | End: 2018-08-31
Attending: SPECIALIST | Admitting: SPECIALIST
Payer: COMMERCIAL

## 2018-08-31 ENCOUNTER — ANESTHESIA (OUTPATIENT)
Dept: SURGERY | Facility: OTHER | Age: 58
End: 2018-08-31
Payer: COMMERCIAL

## 2018-08-31 VITALS
BODY MASS INDEX: 29.73 KG/M2 | HEART RATE: 68 BPM | DIASTOLIC BLOOD PRESSURE: 70 MMHG | SYSTOLIC BLOOD PRESSURE: 138 MMHG | TEMPERATURE: 98 F | WEIGHT: 185 LBS | RESPIRATION RATE: 18 BRPM | HEIGHT: 66 IN | OXYGEN SATURATION: 100 %

## 2018-08-31 DIAGNOSIS — R51.9 FACIAL PAIN: Primary | ICD-10-CM

## 2018-08-31 DIAGNOSIS — G44.52 HEADACHE, NEW DAILY PERSISTENT (NDPH): ICD-10-CM

## 2018-08-31 PROCEDURE — 36000707: Performed by: SPECIALIST

## 2018-08-31 PROCEDURE — 63600175 PHARM REV CODE 636 W HCPCS: Performed by: NURSE ANESTHETIST, CERTIFIED REGISTERED

## 2018-08-31 PROCEDURE — 37000009 HC ANESTHESIA EA ADD 15 MINS: Performed by: SPECIALIST

## 2018-08-31 PROCEDURE — 36000706: Performed by: SPECIALIST

## 2018-08-31 PROCEDURE — 25000003 PHARM REV CODE 250: Performed by: NURSE ANESTHETIST, CERTIFIED REGISTERED

## 2018-08-31 PROCEDURE — 25000003 PHARM REV CODE 250: Performed by: ANESTHESIOLOGY

## 2018-08-31 PROCEDURE — 88305 TISSUE EXAM BY PATHOLOGIST: CPT | Mod: 26,,, | Performed by: PATHOLOGY

## 2018-08-31 PROCEDURE — 25000003 PHARM REV CODE 250: Performed by: SPECIALIST

## 2018-08-31 PROCEDURE — 88305 TISSUE EXAM BY PATHOLOGIST: CPT | Performed by: PATHOLOGY

## 2018-08-31 PROCEDURE — 37000008 HC ANESTHESIA 1ST 15 MINUTES: Performed by: SPECIALIST

## 2018-08-31 PROCEDURE — 71000015 HC POSTOP RECOV 1ST HR: Performed by: SPECIALIST

## 2018-08-31 RX ORDER — HYDROCODONE BITARTRATE AND ACETAMINOPHEN 5; 325 MG/1; MG/1
TABLET ORAL
Qty: 20 TABLET | Refills: 0 | Status: SHIPPED | OUTPATIENT
Start: 2018-08-31 | End: 2019-02-01

## 2018-08-31 RX ORDER — SODIUM CHLORIDE 9 MG/ML
INJECTION, SOLUTION INTRAVENOUS CONTINUOUS
Status: DISCONTINUED | OUTPATIENT
Start: 2018-08-31 | End: 2018-08-31 | Stop reason: HOSPADM

## 2018-08-31 RX ORDER — LIDOCAINE HYDROCHLORIDE 10 MG/ML
0.5 INJECTION, SOLUTION EPIDURAL; INFILTRATION; INTRACAUDAL; PERINEURAL ONCE
Status: DISCONTINUED | OUTPATIENT
Start: 2018-08-31 | End: 2018-08-31 | Stop reason: HOSPADM

## 2018-08-31 RX ORDER — SODIUM CHLORIDE, SODIUM LACTATE, POTASSIUM CHLORIDE, CALCIUM CHLORIDE 600; 310; 30; 20 MG/100ML; MG/100ML; MG/100ML; MG/100ML
INJECTION, SOLUTION INTRAVENOUS CONTINUOUS
Status: DISCONTINUED | OUTPATIENT
Start: 2018-08-31 | End: 2018-08-31 | Stop reason: HOSPADM

## 2018-08-31 RX ORDER — FENTANYL CITRATE 50 UG/ML
INJECTION, SOLUTION INTRAMUSCULAR; INTRAVENOUS
Status: DISCONTINUED | OUTPATIENT
Start: 2018-08-31 | End: 2018-08-31

## 2018-08-31 RX ORDER — SODIUM CHLORIDE 0.9 % (FLUSH) 0.9 %
3 SYRINGE (ML) INJECTION
Status: DISCONTINUED | OUTPATIENT
Start: 2018-08-31 | End: 2018-08-31 | Stop reason: HOSPADM

## 2018-08-31 RX ORDER — LIDOCAINE HYDROCHLORIDE 10 MG/ML
INJECTION, SOLUTION EPIDURAL; INFILTRATION; INTRACAUDAL; PERINEURAL
Status: DISCONTINUED | OUTPATIENT
Start: 2018-08-31 | End: 2018-08-31 | Stop reason: HOSPADM

## 2018-08-31 RX ORDER — ONDANSETRON 2 MG/ML
INJECTION INTRAMUSCULAR; INTRAVENOUS
Status: DISCONTINUED | OUTPATIENT
Start: 2018-08-31 | End: 2018-08-31

## 2018-08-31 RX ORDER — ONDANSETRON 2 MG/ML
4 INJECTION INTRAMUSCULAR; INTRAVENOUS DAILY PRN
Status: DISCONTINUED | OUTPATIENT
Start: 2018-08-31 | End: 2018-08-31 | Stop reason: HOSPADM

## 2018-08-31 RX ORDER — OXYCODONE HYDROCHLORIDE 5 MG/1
5 TABLET ORAL
Status: DISCONTINUED | OUTPATIENT
Start: 2018-08-31 | End: 2018-08-31 | Stop reason: HOSPADM

## 2018-08-31 RX ORDER — HYDROMORPHONE HYDROCHLORIDE 2 MG/ML
0.4 INJECTION, SOLUTION INTRAMUSCULAR; INTRAVENOUS; SUBCUTANEOUS EVERY 5 MIN PRN
Status: DISCONTINUED | OUTPATIENT
Start: 2018-08-31 | End: 2018-08-31 | Stop reason: HOSPADM

## 2018-08-31 RX ORDER — MIDAZOLAM HYDROCHLORIDE 1 MG/ML
INJECTION INTRAMUSCULAR; INTRAVENOUS
Status: DISCONTINUED | OUTPATIENT
Start: 2018-08-31 | End: 2018-08-31

## 2018-08-31 RX ORDER — PROPOFOL 10 MG/ML
VIAL (ML) INTRAVENOUS CONTINUOUS PRN
Status: DISCONTINUED | OUTPATIENT
Start: 2018-08-31 | End: 2018-08-31

## 2018-08-31 RX ORDER — PROPOFOL 10 MG/ML
VIAL (ML) INTRAVENOUS
Status: DISCONTINUED | OUTPATIENT
Start: 2018-08-31 | End: 2018-08-31

## 2018-08-31 RX ORDER — GLYCOPYRROLATE 0.2 MG/ML
INJECTION INTRAMUSCULAR; INTRAVENOUS
Status: DISCONTINUED | OUTPATIENT
Start: 2018-08-31 | End: 2018-08-31

## 2018-08-31 RX ORDER — FENTANYL CITRATE 50 UG/ML
25 INJECTION, SOLUTION INTRAMUSCULAR; INTRAVENOUS EVERY 5 MIN PRN
Status: DISCONTINUED | OUTPATIENT
Start: 2018-08-31 | End: 2018-08-31 | Stop reason: HOSPADM

## 2018-08-31 RX ORDER — MEPERIDINE HYDROCHLORIDE 50 MG/ML
12.5 INJECTION INTRAMUSCULAR; INTRAVENOUS; SUBCUTANEOUS ONCE AS NEEDED
Status: DISCONTINUED | OUTPATIENT
Start: 2018-08-31 | End: 2018-08-31 | Stop reason: HOSPADM

## 2018-08-31 RX ORDER — LIDOCAINE HYDROCHLORIDE 10 MG/ML
INJECTION, SOLUTION INTRAVENOUS
Status: DISCONTINUED | OUTPATIENT
Start: 2018-08-31 | End: 2018-08-31

## 2018-08-31 RX ORDER — ACETAMINOPHEN 10 MG/ML
INJECTION, SOLUTION INTRAVENOUS
Status: DISCONTINUED | OUTPATIENT
Start: 2018-08-31 | End: 2018-08-31

## 2018-08-31 RX ADMIN — SODIUM CHLORIDE, SODIUM LACTATE, POTASSIUM CHLORIDE, AND CALCIUM CHLORIDE: 600; 310; 30; 20 INJECTION, SOLUTION INTRAVENOUS at 06:08

## 2018-08-31 RX ADMIN — PROPOFOL 30 MG: 10 INJECTION, EMULSION INTRAVENOUS at 07:08

## 2018-08-31 RX ADMIN — CEFAZOLIN SODIUM 1 G: 1 SOLUTION INTRAVENOUS at 07:08

## 2018-08-31 RX ADMIN — ONDANSETRON 4 MG: 2 INJECTION INTRAMUSCULAR; INTRAVENOUS at 07:08

## 2018-08-31 RX ADMIN — FENTANYL CITRATE 50 MCG: 50 INJECTION, SOLUTION INTRAMUSCULAR; INTRAVENOUS at 07:08

## 2018-08-31 RX ADMIN — PROPOFOL 100 MCG/KG/MIN: 10 INJECTION, EMULSION INTRAVENOUS at 07:08

## 2018-08-31 RX ADMIN — ACETAMINOPHEN 1000 MG: 10 INJECTION, SOLUTION INTRAVENOUS at 07:08

## 2018-08-31 RX ADMIN — LIDOCAINE HYDROCHLORIDE 50 MG: 10 INJECTION, SOLUTION INTRAVENOUS at 07:08

## 2018-08-31 RX ADMIN — MIDAZOLAM HYDROCHLORIDE 2 MG: 1 INJECTION, SOLUTION INTRAMUSCULAR; INTRAVENOUS at 07:08

## 2018-08-31 RX ADMIN — GLYCOPYRROLATE 0.2 MG: 0.2 INJECTION, SOLUTION INTRAMUSCULAR; INTRAVENOUS at 07:08

## 2018-08-31 NOTE — DISCHARGE INSTRUCTIONS
Understanding Temporal Artery Biopsy    A biopsy is a procedure used to remove samples of tissue from a site in your body. During a temporal artery biopsy, the healthcare provider will remove a small sample of tissue from one or both of your temporal arteries. These arteries are located in the sides of your forehead (temples).  Why a temporal artery biopsy is done  A temporal artery biopsy is most often done to confirm a diagnosis of giant cell arteritis. This condition occurs when the lining of your arteries become red, swollen, and irritated (inflamed). Giant cell arteritis most often involves the temporal arteries. For this reason, the condition is also called temporal arteritis. Prompt diagnosis and treatment of giant cell arteritis is important because the condition can lead to problems such as blindness and stroke.  How temporal artery biopsy is done  The procedure is usually done as an outpatient procedure. This means you go home the same day. The time it takes to do the procedure can vary. During the procedure:  · The skin over the biopsy site (left or right temple) is cleaned.  · The healthcare provider uses Doppler ultrasound imaging to locate the artery, if needed. He or she marks the site with a pen.  · Medicine may be put on the site to numb the skin. Local anesthetic is also injected into the area. This helps prevent pain during the procedure.  · When the area is numb, the provider makes an incision over the temporal artery.  · Once the artery is located, the provider clamps or ties off with stitches (sutures) the section to be used for the biopsy. He or she than cuts a small piece of the artery from this section and removes it. The provider sutures or cauterizes the remaining ends of the artery.  · The skin incision is closed with sutures. A pressure bandage may be placed over the site.  · If needed, the provider repeats the procedure in the other temple.  · The sample of tissue from the artery is  sent to a lab to be checked for giant cell arteritis (or any other problems).  · Your healthcare provider will get in touch with you about the results when they are ready.  Risks of temporal artery biopsy  · Bleeding and collection of blood under the skin (hematoma)  · Infection  · Scarring or hair loss at the incision site  · Unable to get a diagnosis  Date Last Reviewed: 6/1/2016  © 6576-2266 [employer]. [address] [place] [address], [place] [address]. All rights reserved. This information is not intended as a substitute for professional medical care. Always follow your healthcare professional's instructions.      Anesthesia: Monitored Anesthesia Care (MAC)    Anesthesia Safety  · Have an adult family member or friend drive you home after the procedure.  · For the first 24 hours after your surgery:  ¨ Do not drive or use heavy equipment.  ¨ Do not make important decisions or sign documents.  ¨ Avoid alcohol.  ¨ Have someone stay with you, if possible. They can watch for problems and help keep you safe.

## 2018-08-31 NOTE — OP NOTE
DATE OF PROCEDURE:  08/31/2018.    PREOPERATIVE DIAGNOSIS:  Rule out arteritis.    POSTOPERATIVE DIAGNOSIS:  Rule out arteritis.    PROCEDURE:  Right temporal artery biopsy.    PROCEDURE IN DETAIL:  The patient was taken to the Operating Room, placed on the   operating table in the supine position.  The area around the right ear was   prepped using alcohol.  Sterile drapes were applied.  After adequate IV   sedation, the preauricular area was anesthetized using 1% Xylocaine.  An   incision was made, carried down to the subcutaneous tissue.  Careful dissection   revealed a small temporal artery.  This was dissected out for 2 cm.  It was then   clamp distally and divided.  There was a pulsatile flow through this small   vessel.  The proximal and distal portions were ligated using 3-0 silk.  The 2-cm   segment was removed and sent to Pathology for appropriate studies.  Hemostasis   was obtained using electrocautery.  The wound was irrigated with lidocaine.  The   wound was then closed using 3-0 Vicryl followed by 4-0 Vicryl on the skin glue   and Steri-Strip were applied.  Blood loss was minimal.  The patient tolerated   the procedure and left the Operating Room in stable condition.      SALLY/JAHAIRA  dd: 08/31/2018 08:04:38 (CDT)  td: 08/31/2018 09:03:50 (CDT)  Doc ID   #[id number]  Job ID #[id number]    CC:

## 2018-08-31 NOTE — BRIEF OP NOTE
[facility]  Brief Operative Note     SUMMARY     Surgery Date: 8/31/2018     Surgeon(s) and Role:     * Jamie Hoang MD - Primary    Assisting Surgeon: None    Pre-op Diagnosis:  Acute nonintractable headache, unspecified headache type [R51]    Post-op Diagnosis:  Post-Op Diagnosis Codes:     * Acute nonintractable headache, unspecified headache type [R51]    Procedure(s) (LRB):  BIOPSY, ARTERY, TEMPORAL (Right)    Anesthesia: Monitor Anesthesia Care    Description of the findings of the procedure: 2 cm segment    Findings/Key Components:     Estimated Blood Loss: * No values recorded between 8/31/2018  7:34 AM and 8/31/2018  8:00 AM *min         Specimens:   Specimen (12h ago, onward)    None          Discharge Note    SUMMARY     Admit Date: 8/31/2018    Discharge Date and Time:  08/31/2018 8:01 AM    Hospital Course (synopsis of major diagnoses, care, treatment, and services provided during the course of the hospital stay): treated     Final Diagnosis: Post-Op Diagnosis Codes:     * Acute nonintractable headache, unspecified headache type [R51]    Disposition: Home or Self Care    Follow Up/Patient Instructions:     Medications:  Reconciled Home Medications:      Medication List      START taking these medications    HYDROcodone-acetaminophen 5-325 mg per tablet  Commonly known as:  NORCO  Take 1 or 2 tabs every 4 hours as needed.        CONTINUE taking these medications    CALCIUM 500 + D 500 mg(1,250mg) -200 unit per tablet  Generic drug:  calcium-vitamin D3  Take 1 tablet by mouth 2 (two) times daily with meals.     CALCIUM-MAGNESIUM-VITAMIN D2 ORAL  Take by mouth once daily.     cloNIDine 0.1 MG tablet  Commonly known as:  CATAPRES  Take 1 tablet (0.1 mg total) by mouth 3 (three) times daily as needed (PRN SBP > 165 mmHg).     diltiaZEM 240 MG 24 hr capsule  Commonly known as:  CARDIZEM CD  Take 1 capsule (240 mg total) by mouth every evening.     fish oil-omega-3 fatty acids  "300-1,000 mg capsule  Take by mouth once daily.     folic acid 1 MG tablet  Commonly known as:  FOLVITE  1 TABLET BY MOUTH ONCE A DAY.     hydroCHLOROthiazide 12.5 mg capsule  Commonly known as:  MICROZIDE  TAKE 1 CAPSULE(12.5 MG) BY MOUTH EVERY DAY     ketoconazole 2 % shampoo  Commonly known as:  NIZORAL  Wash hair with medicated shampoo at least 2x/week - let sit on scalp at least 5 minutes prior to rinsing     Lactobacillus rhamnosus GG 10 billion cell capsule  Commonly known as:  CULTURELLE  Take 1 capsule by mouth once daily.     methotrexate 2.5 MG Tab  4 TABS BY MOUTH ONCE WEEKLY ALL AT THE SAME TIME.     multivitamin per tablet  Commonly known as:  THERAGRAN  Take 1 tablet by mouth once daily.     nystatin cream  Commonly known as:  MYCOSTATIN  Apply topically 2 (two) times daily.     predniSONE 1 MG tablet  Commonly known as:  DELTASONE  17.5 mg. Currently on 8mg once daily.  On "step-down" program.  Plan is to decrease 1mg monthly.     * SYNTHROID 50 MCG tablet  Generic drug:  levothyroxine  TAKE 1 TABLET BY MOUTH EVERY MONDAY, TUESDAY, THURSDAY,FRIDAY,AND SUNDAY     * SYNTHROID 75 MCG tablet  Generic drug:  levothyroxine  TAKE 1 TABLET BY MOUTH TWICE A WEEK.     VITAMIN C 100 MG tablet  Generic drug:  ascorbic acid (vitamin C)  Take 600 mg by mouth once daily.         * This list has 2 medication(s) that are the same as other medications prescribed for you. Read the directions carefully, and ask your doctor or other care provider to review them with you.              Discharge Procedure Orders   Diet general     Call MD for:  redness, tenderness, or signs of infection (pain, swelling, redness, odor or green/yellow discharge around incision site)     No dressing needed     Shower on day dressing removed (No bath)   Order Comments: You may shower on the 2nd day following your surgery.     Follow-up Information     Jamie Hoang MD.    Specialty:  General Surgery  Why:  As needed  Contact " information:  [id number]  [place] [address]  [phone]

## 2018-08-31 NOTE — TRANSFER OF CARE
"Anesthesia Transfer of Care Note    Patient: Brandie Acevedo    Procedure(s) Performed: Procedure(s) (LRB):  BIOPSY, ARTERY, TEMPORAL (Right)    Patient location: [facility]    Anesthesia Type: general    Transport from OR: Transported from OR on 2-3 L/min O2 by NC with adequate spontaneous ventilation    Post pain: adequate analgesia    Post assessment: no apparent anesthetic complications    Post vital signs: stable    Level of consciousness: awake    Nausea/Vomiting: no nausea/vomiting    Complications: none    Transfer of care protocol was followed      Last vitals:   Visit Vitals  BP (!) 180/81 (BP Location: Right arm, Patient Position: Lying)   Pulse 60   Temp 36.5 °C (97.7 °F) (Oral)   Resp 16   Ht 5' 6" (1.676 m)   Wt 83.9 kg (184 lb 16 oz)   LMP 12/06/2013   SpO2 96%   Breastfeeding? No   BMI 29.86 kg/m²     "

## 2018-08-31 NOTE — INTERVAL H&P NOTE
The patient has been examined and the H&P has been reviewed:    I concur with the findings and no changes have occurred since H&P was written.    Anesthesia/Surgery risks, benefits and alternative options discussed and understood by patient/family.          Active Hospital Problems    Diagnosis  POA    Facial pain [R51]  Yes      Resolved Hospital Problems   No resolved problems to display.

## 2018-08-31 NOTE — ANESTHESIA POSTPROCEDURE EVALUATION
"Anesthesia Post Evaluation    Patient: Brandie Acevedo    Procedure(s) Performed: Procedure(s) (LRB):  BIOPSY, ARTERY, TEMPORAL (Right)    Final Anesthesia Type: general  Patient location during evaluation: [facility]  Patient participation: Yes- Able to Participate  Level of consciousness: awake and alert  Post-procedure vital signs: reviewed and stable  Pain management: adequate  Airway patency: patent  PONV status at discharge: No PONV  Anesthetic complications: no      Cardiovascular status: blood pressure returned to baseline  Respiratory status: unassisted  Hydration status: euvolemic  Follow-up not needed.        Visit Vitals  BP (!) 180/81 (BP Location: Right arm, Patient Position: Lying)   Pulse 60   Temp 36.5 °C (97.7 °F) (Oral)   Resp 16   Ht 5' 6" (1.676 m)   Wt 83.9 kg (184 lb 16 oz)   LMP 12/06/2013   SpO2 96%   Breastfeeding? No   BMI 29.86 kg/m²       Pain/Lety Score: Pain Assessment Performed: Yes (8/31/2018  5:35 AM)  Presence of Pain: complains of pain/discomfort (8/31/2018  5:35 AM)        "

## 2018-08-31 NOTE — PLAN OF CARE
Brandie Acevedo has met all discharge criteria from Phase II. Vital Signs are stable, ambulating  without difficulty. Discharge instructions given, patient verbalized understanding. Discharged from facility via wheelchair in stable condition.

## 2018-09-11 ENCOUNTER — PATIENT OUTREACH (OUTPATIENT)
Dept: OTHER | Facility: OTHER | Age: 58
End: 2018-09-11

## 2018-09-11 NOTE — PROGRESS NOTES
Last 5 Patient Entered Readings                                      Current 30 Day Average: 137/80     Recent Readings 9/11/2018 9/10/2018 9/10/2018 9/7/2018 9/6/2018    SBP (mmHg) 138 158 148 141 145    DBP (mmHg) 80 79 90 84 88    Pulse 66 72 73 76 68          Patient's BP average is above goal of <130/80.     Patient denies s/s of hypotension (lightheadedness, dizziness, nausea, fatigue) associated with low readings. Instructed patient to inform me if this occurs, patient confirms understanding.      Patient denies s/s of hypertension (SOB, CP, severe headaches, changes in vision) associated with high readings. Instructed patient to go to the ED if BP > 180/110 and accompanied by hypertensive s/s, patient confirms understanding.    Will continue to monitor regularly. Will follow up in 2-3 weeks, sooner if BP begins to trend upward or downward.    Patient has my contact information and knows to call with any concerns or clinical changes.     Current HTN regimen:  Hypertension Medications             cloNIDine (CATAPRES) 0.1 MG tablet Take 1 tablet (0.1 mg total) by mouth 3 (three) times daily as needed (PRN SBP > 165 mmHg).    diltiaZEM (CARDIZEM CD) 240 MG 24 hr capsule Take 1 capsule (240 mg total) by mouth every evening.    hydroCHLOROthiazide (MICROZIDE) 12.5 mg capsule TAKE 1 CAPSULE(12.5 MG) BY MOUTH EVERY DAY        Patient reports taking higher dose of prednisone 40 mg daily since 8/31. She is not sleeping as well on higher dose of prednisone. She is awaiting results of biopsy to rule out arteritis. She took clonidine last night for the first time recently. Discussed increasing diltiazem or hctz if patient remains on higher dose of prednisone. She expects to receive results of biopsy and treatment plan this week and requests call next week.

## 2018-09-12 ENCOUNTER — TELEPHONE (OUTPATIENT)
Dept: OTOLARYNGOLOGY | Facility: CLINIC | Age: 58
End: 2018-09-12

## 2018-09-12 NOTE — TELEPHONE ENCOUNTER
----- Message from Pauly [facility] sent at 9/12/2018  2:49 PM CDT -----  Contact: Felisha with Dr Dae Simmons  The patient scheduled an appt with Dr Dae Simmons and she saw him today and explained that Dr Philip referred her to Dr Simmons which they do not have.  Would you please fax over what she is being sent to a neurologist for and a referral for Dr Gaytan.  If any questions please call [phone] or fax over the requested to [phone].  Thank you!

## 2018-09-19 NOTE — PROGRESS NOTES
Last 5 Patient Entered Readings                                      Current 30 Day Average: 137/79     Recent Readings 9/19/2018 9/18/2018 9/17/2018 9/16/2018 9/15/2018    SBP (mmHg) 146 137 129 139 126    DBP (mmHg) 82 79 70 88 76    Pulse 68 64 72 70 63        Health  scheduled for outreach this week. Placed task to review timing of BP measurement after waking and taking medications.

## 2018-09-20 ENCOUNTER — PATIENT OUTREACH (OUTPATIENT)
Dept: OTHER | Facility: OTHER | Age: 58
End: 2018-09-20

## 2018-09-20 NOTE — PROGRESS NOTES
Last 5 Patient Entered Readings                                      Current 30 Day Average: 137/79     Recent Readings 9/20/2018 9/19/2018 9/18/2018 9/17/2018 9/16/2018    SBP (mmHg) 133 146 137 129 139    DBP (mmHg) 83 82 79 70 88    Pulse 73 68 64 72 70          Digital Medicine: Health  Follow Up    Lifestyle Modifications:    1.Dietary Modifications (Sodium intake <2,000mg/day, food labels, dining out): Deferred.     2.Physical Activity: Patient reports her sleep has improved as have headaches. Reports she went to a neurologist outside of [facility] for a workup and they believe she is experiencing migraines. She is now taking 10mg of amitriptyline each night per her report.     3.Medication Therapy: Patient has been compliant with the medication regimen. States she is continuing to taper down on prednisone and that this has helped sleep in addition to amitriptyline. Has been taking first blood pressure reading in the morning prior to her bp medication and not always resting prior to readings during the day. Educated on technique and timing and agrees to wait until 45 minutes to one hour after taking blood pressure medication before taking readings and resting for 5-10 minutes during the day. Encouraged to wait 10-15 minutes, however she does note days are busy due to work. Agrees to make adjustments on timing of readings beginning today.     4.Patient has the following medication side effects/concerns:  No side effects/concerns aside from headache/migraine at this point, and she is being followed for this by MD.     Follow up with Mrs. Brandie FROST Curtis completed. No further questions or concerns. Will continue to follow up to achieve health goals.

## 2018-09-24 ENCOUNTER — PATIENT MESSAGE (OUTPATIENT)
Dept: CARDIOLOGY | Facility: CLINIC | Age: 58
End: 2018-09-24

## 2018-09-26 ENCOUNTER — PATIENT OUTREACH (OUTPATIENT)
Dept: OTHER | Facility: OTHER | Age: 58
End: 2018-09-26

## 2018-09-26 DIAGNOSIS — I10 ESSENTIAL HYPERTENSION: Primary | ICD-10-CM

## 2018-09-26 RX ORDER — FOSINOPIRL SODIUM 10 MG/1
10 TABLET ORAL DAILY
Qty: 30 TABLET | Refills: 2 | Status: SHIPPED | OUTPATIENT
Start: 2018-09-26 | End: 2018-11-06 | Stop reason: SINTOL

## 2018-09-26 NOTE — PROGRESS NOTES
Last 5 Patient Entered Readings                                      Current 30 Day Average: 141/79     Recent Readings 9/26/2018 9/25/2018 9/25/2018 9/24/2018 9/22/2018    SBP (mmHg) 150 149 149 154 153    DBP (mmHg) 83 83 89 78 70    Pulse 75 73 75 82 76          Patient's BP average is above goal of <130/80.     Patient denies s/s of hypotension (lightheadedness, dizziness, nausea, fatigue) associated with low readings. Instructed patient to inform me if this occurs, patient confirms understanding.      Patient denies s/s of hypertension (SOB, CP, severe headaches, changes in vision) associated with high readings. Instructed patient to go to the ED if BP > 180/110 and accompanied by hypertensive s/s, patient confirms understanding.    Patient reports she has stopped and rechallenged hctz multiple times and experiences headache with each rechallenge. She requests medication change. She has had side effects with multiple ARBs. She had cough with lisinopril and is amenable to challenge of alternative ACE-I; begin fosinopril 10 mg daily. Consider changing diltiazem to amlodipine if need additional BP lowering in future.    Will continue to monitor regularly. Will follow up in 2-3 weeks, sooner if BP begins to trend upward or downward.    Patient has my contact information and knows to call with any concerns or clinical changes.     Current HTN regimen:  Hypertension Medications             cloNIDine (CATAPRES) 0.1 MG tablet Take 1 tablet (0.1 mg total) by mouth 3 (three) times daily as needed (PRN SBP > 165 mmHg).    diltiaZEM (CARDIZEM CD) 240 MG 24 hr capsule Take 1 capsule (240 mg total) by mouth every evening.    hydroCHLOROthiazide (MICROZIDE) 12.5 mg capsule TAKE 1 CAPSULE(12.5 MG) BY MOUTH EVERY DAY

## 2018-10-16 ENCOUNTER — PATIENT OUTREACH (OUTPATIENT)
Dept: OTHER | Facility: OTHER | Age: 58
End: 2018-10-16

## 2018-10-16 NOTE — PROGRESS NOTES
Last 5 Patient Entered Readings                                      Current 30 Day Average: 146/81     Recent Readings 10/16/2018 10/15/2018 10/15/2018 10/14/2018 10/14/2018    SBP (mmHg) 123 144 142 145 159    DBP (mmHg) 73 80 95 86 95    Pulse 73 77 84 82 83          Left voicemail. Follow up on fosinopril initiation.

## 2018-10-23 NOTE — PROGRESS NOTES
Last 5 Patient Entered Readings                                      Current 30 Day Average: 146/82     Recent Readings 10/20/2018 10/19/2018 10/18/2018 10/16/2018 10/15/2018    SBP (mmHg) 139 136 127 123 144    DBP (mmHg) 83 83 73 73 80    Pulse 71 64 67 73 77          Patient's BP average is above goal of <130/80.     Patient denies s/s of hypotension (lightheadedness, dizziness, nausea, fatigue) associated with low readings. Instructed patient to inform me if this occurs, patient confirms understanding.      Patient denies s/s of hypertension (SOB, CP, severe headaches, changes in vision) associated with high readings. Instructed patient to go to the ED if BP > 180/110 and accompanied by hypertensive s/s, patient confirms understanding.    Patient expects to further decrease prednisone in ~ 1.5 weeks (now at 7mg). She reports taking 1 dose of clonidine last week but unable to clarify exact date (likely 10/16 or 17). She is feeling better regarding headaches and dizziness since stopping hctz. Reports slight cough with fosinopril, specifically mentioned at night. However, she does not want to make further BP medication adjustment today. She will call if cough is disruptive. Could consider changing diltiazem to amlodipine.    Will continue to monitor regularly. Will follow up in 2-3 weeks, sooner if BP begins to trend upward or downward.    Patient has my contact information and knows to call with any concerns or clinical changes.     Current HTN regimen:  Hypertension Medications             cloNIDine (CATAPRES) 0.1 MG tablet Take 1 tablet (0.1 mg total) by mouth 3 (three) times daily as needed (PRN SBP > 165 mmHg).    diltiaZEM (CARDIZEM CD) 240 MG 24 hr capsule Take 1 capsule (240 mg total) by mouth every evening.    fosinopril (MONOPRIL) 10 MG Tab Take 1 tablet (10 mg total) by mouth once daily. (for blood pressure, replaces hydrochlorothiazide)

## 2018-11-02 ENCOUNTER — PATIENT MESSAGE (OUTPATIENT)
Dept: CARDIOLOGY | Facility: CLINIC | Age: 58
End: 2018-11-02

## 2018-11-06 ENCOUNTER — PATIENT OUTREACH (OUTPATIENT)
Dept: OTHER | Facility: OTHER | Age: 58
End: 2018-11-06

## 2018-11-06 DIAGNOSIS — I10 ESSENTIAL HYPERTENSION: Primary | ICD-10-CM

## 2018-11-06 RX ORDER — AMLODIPINE BESYLATE 5 MG/1
TABLET ORAL
Qty: 60 TABLET | Refills: 2 | OUTPATIENT
Start: 2018-11-06 | End: 2018-11-27 | Stop reason: SINTOL

## 2018-11-06 NOTE — PROGRESS NOTES
Last 5 Patient Entered Readings                                      Current 30 Day Average: 138/82     Recent Readings 11/6/2018 11/5/2018 11/3/2018 11/2/2018 10/30/2018    SBP (mmHg) 143 151 139 155 127    DBP (mmHg) 78 86 83 83 77    Pulse 70 70 73 66 79          Patient agreeable to medication change due to cough from fosinopril. Stop fosinopril and diltiazem. Begin amlodipine 5 mg once daily.

## 2018-11-20 ENCOUNTER — PATIENT OUTREACH (OUTPATIENT)
Dept: OTHER | Facility: OTHER | Age: 58
End: 2018-11-20

## 2018-11-20 NOTE — PROGRESS NOTES
"Last 5 Patient Entered Readings                                      Current 30 Day Average: 142/85     Recent Readings 11/20/2018 11/19/2018 11/19/2018 11/18/2018 11/14/2018    SBP (mmHg) 144 152 145 149 161    DBP (mmHg) 78 86 101 87 92    Pulse 71 68 71 74 67        Digital Medicine: Health  Follow Up    Lifestyle Modifications:    1.Dietary Modifications (Sodium intake <2,000mg/day, food labels, dining out):  Deferred.     2.Physical Activity: Reports she has been under increased stress due to moving. States she is now "getting settled" and would like to focus on getting more physical activity through walking and biking over the next 3-4 weeks. States she has not been as active and attributes this and moving to increased readings. Reports she has been resting prior to her blood pressure readings and using appropriate timing and technique.     3.Medication Therapy: Patient has been compliant with the medication regimen.    4.Patient has the following medication side effects/concerns: Patient is reporting a "slight headache" towards the end of the day, however states she is unable to determine if this is a tension/stress headache or from blood pressure. Denies other side effects/concerns at this time. Will want to inquire about what helps to relieve headache. She reports it does not happen every day.     Follow up with . Brandie FROST Curtis completed. No further questions or concerns. Will continue to follow up to achieve health goals.      "

## 2018-11-27 ENCOUNTER — PATIENT OUTREACH (OUTPATIENT)
Dept: OTHER | Facility: OTHER | Age: 58
End: 2018-11-27

## 2018-11-27 DIAGNOSIS — I10 ESSENTIAL HYPERTENSION: Primary | ICD-10-CM

## 2018-11-27 RX ORDER — DILTIAZEM HYDROCHLORIDE 240 MG/1
240 CAPSULE, COATED, EXTENDED RELEASE ORAL DAILY
COMMUNITY
End: 2018-12-10 | Stop reason: SDUPTHER

## 2018-11-27 NOTE — PROGRESS NOTES
Last 5 Patient Entered Readings                                      Current 30 Day Average: 145/85     Recent Readings 11/27/2018 11/26/2018 11/26/2018 11/26/2018 11/25/2018    SBP (mmHg) 133 147 154 148 144    DBP (mmHg) 77 76 90 90 72    Pulse 66 70 58 59 79          Patient's BP average is above goal of <130/80.     Patient denies s/s of hypotension (lightheadedness, dizziness, nausea, fatigue) associated with low readings. Instructed patient to inform me if this occurs, patient confirms understanding.      Patient denies s/s of hypertension (SOB, CP, severe headaches, changes in vision) associated with high readings. Instructed patient to go to the ED if BP > 180/110 and accompanied by hypertensive s/s, patient confirms understanding.    Patient reports she was not feeling well, lethargic, muscle aches, and headaches with amlodipine. Patient reports symptoms started about 11/15. As of 11/22, she stopped amlodipine and restarted diltiazem. Reports symptoms have improved. Endorses overeating during the holidays. Encouraged healthy diet through the holidays. She reports EtOH in moderation. Consider increasing diltiazem if BP elevated on follow up. Requested at least 3 readings per week.    Will continue to monitor regularly. Will follow up in 2-3 weeks, sooner if BP begins to trend upward or downward.    Patient has my contact information and knows to call with any concerns or clinical changes.     Current HTN regimen:  Hypertension Medications             amLODIPine (NORVASC) 5 MG tablet Take 1 tablet (5mg) by mouth once daily. May increase to 2 tablets once daily in 2-4 weeks dependent on blood pressure. (stop fosinopril and diltiazem)    cloNIDine (CATAPRES) 0.1 MG tablet Take 1 tablet (0.1 mg total) by mouth 3 (three) times daily as needed (PRN SBP > 165 mmHg).

## 2018-12-09 ENCOUNTER — PATIENT MESSAGE (OUTPATIENT)
Dept: CARDIOLOGY | Facility: CLINIC | Age: 58
End: 2018-12-09

## 2018-12-10 RX ORDER — DILTIAZEM HYDROCHLORIDE 240 MG/1
240 CAPSULE, COATED, EXTENDED RELEASE ORAL DAILY
Qty: 30 CAPSULE | Refills: 5 | Status: SHIPPED | OUTPATIENT
Start: 2018-12-10 | End: 2019-02-27

## 2018-12-10 NOTE — PROGRESS NOTES
Last 5 Patient Entered Readings                                      Current 30 Day Average: 143/84     Recent Readings 12/7/2018 12/6/2018 12/5/2018 12/5/2018 11/30/2018    SBP (mmHg) 135 136 140 150 119    DBP (mmHg) 78 87 84 85 79    Pulse 69 70 69 75 66        BP improving. No further medication recommendations at this time.

## 2019-01-07 ENCOUNTER — PATIENT MESSAGE (OUTPATIENT)
Dept: FAMILY MEDICINE | Facility: CLINIC | Age: 59
End: 2019-01-07

## 2019-01-07 DIAGNOSIS — E03.9 HYPOTHYROIDISM, UNSPECIFIED TYPE: ICD-10-CM

## 2019-01-07 DIAGNOSIS — E03.9 HYPOTHYROIDISM: ICD-10-CM

## 2019-01-07 RX ORDER — LEVOTHYROXINE SODIUM 50 UG/1
TABLET ORAL
Qty: 30 TABLET | Refills: 11 | Status: SHIPPED | OUTPATIENT
Start: 2019-01-07 | End: 2020-01-09

## 2019-01-07 RX ORDER — LEVOTHYROXINE SODIUM 75 UG/1
TABLET ORAL
Qty: 30 TABLET | Refills: 9 | Status: SHIPPED | OUTPATIENT
Start: 2019-01-07 | End: 2020-01-07

## 2019-01-07 RX ORDER — LEVOTHYROXINE SODIUM 50 UG/1
TABLET ORAL
Qty: 30 TABLET | Refills: 0 | OUTPATIENT
Start: 2019-01-07

## 2019-01-08 ENCOUNTER — PATIENT OUTREACH (OUTPATIENT)
Dept: OTHER | Facility: OTHER | Age: 59
End: 2019-01-08

## 2019-01-08 NOTE — PROGRESS NOTES
Last 5 Patient Entered Readings                                      Current 30 Day Average: 140/83     Recent Readings 1/5/2019 12/30/2018 12/28/2018 12/19/2018 12/19/2018    SBP (mmHg) 130 140 131 152 153    DBP (mmHg) 84 76 74 84 97    Pulse 64 67 66 60 64        HPI:  Called patient to follow up. Patient endorses adherence to medication regimen. Patient denies hypotensive s/sx (lightheadedness, dizziness, nausea, fatigue); patient denies hypertensive s/sx (SOB, CP, severe headaches, changes in vision, dizziness, fatigue, confusion, anxiety, nosebleeds).    Assessment:  Reviewed recent readings. Per 2017 ACC/ AHA HTN guidelines (goal of BP < 130/80), current 30-day average needs to be addressed more thoroughly today. BP on downward trend.     Plan:  Continue current medication regimen. Discussed increasing diltiazem if BP does not continue to improve. Encouraged resumption of healthy lifestyle after holidays. I will continue to monitor regularly and will follow-up in 2 to 3 weeks, sooner if blood pressure begins to trend upward or downward.     Current medication regimen:  Hypertension Medications             cloNIDine (CATAPRES) 0.1 MG tablet Take 1 tablet (0.1 mg total) by mouth 3 (three) times daily as needed (PRN SBP > 165 mmHg).    diltiaZEM (CARDIZEM CD) 240 MG 24 hr capsule Take 1 capsule (240 mg total) by mouth once daily.          Patient denies having questions or concerns. Patient has my contact information and knows to call with any concerns or clinical changes.

## 2019-01-14 ENCOUNTER — TELEPHONE (OUTPATIENT)
Dept: OBSTETRICS AND GYNECOLOGY | Facility: CLINIC | Age: 59
End: 2019-01-14

## 2019-01-14 DIAGNOSIS — Z12.31 VISIT FOR SCREENING MAMMOGRAM: Primary | ICD-10-CM

## 2019-01-14 NOTE — TELEPHONE ENCOUNTER
----- Message from Kamala Basilio sent at 1/14/2019 11:20 AM CST -----  Contact: pt  Calling to schedule a mammogram but need orders and please contact pt once orders have been sent and please advise. [phone] (home)

## 2019-01-29 NOTE — PROGRESS NOTES
Last 5 Patient Entered Readings                                      Current 30 Day Average: 129/76     Recent Readings 1/29/2019 1/29/2019 1/27/2019 1/24/2019 1/21/2019    SBP (mmHg) 113 102 123 121 128    DBP (mmHg) 68 75 67 75 73    Pulse 62 67 55 59 60          BP improved and at goal. No medication recommendations at this time.

## 2019-01-31 ENCOUNTER — HOSPITAL ENCOUNTER (OUTPATIENT)
Dept: RADIOLOGY | Facility: HOSPITAL | Age: 59
Discharge: HOME | End: 2019-01-31
Attending: OBSTETRICS & GYNECOLOGY
Payer: COMMERCIAL

## 2019-01-31 DIAGNOSIS — Z12.31 VISIT FOR SCREENING MAMMOGRAM: ICD-10-CM

## 2019-01-31 PROCEDURE — 77067 MAMMO DIGITAL SCREENING BILAT WITH TOMOSYNTHESIS_CAD: ICD-10-PCS | Mod: 26,,, | Performed by: RADIOLOGY

## 2019-01-31 PROCEDURE — 77063 BREAST TOMOSYNTHESIS BI: CPT | Mod: 26,,, | Performed by: RADIOLOGY

## 2019-01-31 PROCEDURE — 77067 SCR MAMMO BI INCL CAD: CPT | Mod: TC,PO

## 2019-01-31 PROCEDURE — 77063 MAMMO DIGITAL SCREENING BILAT WITH TOMOSYNTHESIS_CAD: ICD-10-PCS | Mod: 26,,, | Performed by: RADIOLOGY

## 2019-01-31 PROCEDURE — 77067 SCR MAMMO BI INCL CAD: CPT | Mod: 26,,, | Performed by: RADIOLOGY

## 2019-02-01 ENCOUNTER — OFFICE VISIT (OUTPATIENT)
Dept: OBSTETRICS AND GYNECOLOGY | Facility: CLINIC | Age: 59
End: 2019-02-01
Payer: COMMERCIAL

## 2019-02-01 VITALS
SYSTOLIC BLOOD PRESSURE: 100 MMHG | BODY MASS INDEX: 30.44 KG/M2 | DIASTOLIC BLOOD PRESSURE: 64 MMHG | HEIGHT: 66 IN | WEIGHT: 189.38 LBS

## 2019-02-01 DIAGNOSIS — M35.3 PMR (POLYMYALGIA RHEUMATICA): ICD-10-CM

## 2019-02-01 DIAGNOSIS — Z01.419 ENCOUNTER FOR GYNECOLOGICAL EXAMINATION WITHOUT ABNORMAL FINDING: Primary | ICD-10-CM

## 2019-02-01 PROCEDURE — 3078F DIAST BP <80 MM HG: CPT | Mod: CPTII,S$GLB,, | Performed by: OBSTETRICS & GYNECOLOGY

## 2019-02-01 PROCEDURE — 99396 PR PREVENTIVE VISIT,EST,40-64: ICD-10-PCS | Mod: S$GLB,,, | Performed by: OBSTETRICS & GYNECOLOGY

## 2019-02-01 PROCEDURE — 3078F PR MOST RECENT DIASTOLIC BLOOD PRESSURE < 80 MM HG: ICD-10-PCS | Mod: CPTII,S$GLB,, | Performed by: OBSTETRICS & GYNECOLOGY

## 2019-02-01 PROCEDURE — 99396 PREV VISIT EST AGE 40-64: CPT | Mod: S$GLB,,, | Performed by: OBSTETRICS & GYNECOLOGY

## 2019-02-01 PROCEDURE — 3074F PR MOST RECENT SYSTOLIC BLOOD PRESSURE < 130 MM HG: ICD-10-PCS | Mod: CPTII,S$GLB,, | Performed by: OBSTETRICS & GYNECOLOGY

## 2019-02-01 PROCEDURE — 3074F SYST BP LT 130 MM HG: CPT | Mod: CPTII,S$GLB,, | Performed by: OBSTETRICS & GYNECOLOGY

## 2019-02-01 PROCEDURE — 99999 PR PBB SHADOW E&M-EST. PATIENT-LVL III: CPT | Mod: PBBFAC,,, | Performed by: OBSTETRICS & GYNECOLOGY

## 2019-02-01 PROCEDURE — 99999 PR PBB SHADOW E&M-EST. PATIENT-LVL III: ICD-10-PCS | Mod: PBBFAC,,, | Performed by: OBSTETRICS & GYNECOLOGY

## 2019-02-01 NOTE — PROGRESS NOTES
Chief Complaint   Patient presents with    Well Woman    mmg done in december/ dx views done yesterday       History of Present Illness: Brandie Acevedo is a 58 y.o. female that presents today 2019 for well gyn visit.    Past Medical History:   Diagnosis Date    Abnormal Pap smear of cervix     ascus     Cataract     Dizziness     DJD (degenerative joint disease) of cervical spine     DJD (degenerative joint disease) of cervical spine     Hemorrhoids     HTN (hypertension)     Hypothyroidism     Polyarthritis rheumatica 2016    Polymyalgia        Past Surgical History:   Procedure Laterality Date    BIOPSY, ARTERY, TEMPORAL Right 2018    Performed by Jamie Hoang [facility] at [facility] OR    cervical fusion       SECTION      COLONOSCOPY      COSMETIC SURGERY      rhinoplasty    DEXA      ,     KNEE ARTHROSCOPY W/ ACL RECONSTRUCTION Left     RHINOPLASTY TIP      WISDOM TOOTH EXTRACTION         Current Outpatient Medications   Medication Sig Dispense Refill    ascorbic acid, vitamin C, (VITAMIN C) 100 MG tablet Take 600 mg by mouth once daily.      CALCIUM-MAGNESIUM-VITAMIN D2 ORAL Take by mouth once daily.      diltiaZEM (CARDIZEM CD) 240 MG 24 hr capsule Take 1 capsule (240 mg total) by mouth once daily. 30 capsule 5    fish oil-omega-3 fatty acids 300-1,000 mg capsule Take by mouth once daily.      folic acid (FOLVITE) 1 MG tablet 1 TABLET BY MOUTH ONCE A DAY.  3    Lactobacillus rhamnosus GG (CULTURELLE) 10 billion cell capsule Take 1 capsule by mouth once daily.      methotrexate 2.5 MG Tab 4 TABS BY MOUTH ONCE WEEKLY ALL AT THE SAME TIME.  3    multivitamin (THERAGRAN) per tablet Take 1 tablet by mouth once daily.      predniSONE (DELTASONE) 1 MG tablet Take 2 mg by mouth once daily.       SYNTHROID 50 mcg tablet TAKE 1 TABLET BY MOUTH EVERY MONDAY, TUESDAY, THURSDAY,FRIDAY,AND  30 tablet 11    SYNTHROID 75 mcg tablet TAKE 1 TABLET BY MOUTH TWICE  A WEEK. 30 tablet 9    cloNIDine (CATAPRES) 0.1 MG tablet Take 1 tablet (0.1 mg total) by mouth 3 (three) times daily as needed (PRN SBP > 165 mmHg). 90 tablet 6     No current facility-administered medications for this visit.        Review of patient's allergies indicates:   Allergen Reactions    Iodine      Other reaction(s): Rash       Family History   Problem Relation Age of Onset    Heart failure Mother     Heart disease Mother     Osteoporosis Mother     Thyroid disease Mother     Heart failure Father     Heart disease Father     Hypertension Brother     Diabetes Maternal Grandfather     Breast cancer Neg Hx     Ovarian cancer Neg Hx        Social History     Socioeconomic History    Marital status:      Spouse name: None    Number of children: 1    Years of education: None    Highest education level: None   Social Needs    Financial resource strain: None    Food insecurity - worry: None    Food insecurity - inability: None    Transportation needs - medical: None    Transportation needs - non-medical: None   Occupational History     Employer: [employer]   Tobacco Use    Smoking status: Never Smoker    Smokeless tobacco: Never Used   Substance and Sexual Activity    Alcohol use: Yes     Alcohol/week: 1.0 oz     Types: 2 Standard drinks or equivalent per week     Comment: less frequent on mtx    Drug use: No    Sexual activity: Yes     Partners: Male     Birth control/protection: None, Post-menopausal   Other Topics Concern    None   Social History Narrative    Works as an , originally from [employer]       OB History    Para Term  AB Living   1 1 1     1   SAB TAB Ectopic Multiple Live Births           1      # Outcome Date GA Lbr Kevon/2nd Weight Sex Delivery Anes PTL Lv   1 Term    3.175 kg (7 lb) M CS-LTranv EPI N MESSI          Review of Symptoms:  GENERAL: Denies weight gain or weight loss. Feeling well overall.   SKIN: Denies rash or  "lesions.   HEAD: Denies head injury or headache.   NODES: Denies enlarged lymph nodes.   CHEST: Denies chest pain or shortness of breath.   CARDIOVASCULAR: Denies palpitations or left sided chest pain.   ABDOMEN: No abdominal pain, constipation, diarrhea, nausea, vomiting or rectal bleeding.   URINARY: No frequency, dysuria, hematuria, or burning on urination.  HEMATOLOGIC: No easy bruisability or excessive bleeding.   MUSCULOSKELETAL: Denies joint pain or swelling.     /64   Ht 5' 6" (1.676 m)   Wt 85.9 kg (189 lb 6 oz)   LMP 12/06/2013   Physical Exam:  APPEARANCE: Well nourished, well developed, in no acute distress.  SKIN: Normal skin turgor, no lesions.  NECK: Neck symmetric without masses   RESPIRATORY: Normal respiratory effort with no retractions or use of accessory muscles  CARDIOVASCULAR: Peripheral vascular system with no swelling no varicosities and palpation of pulses normal  LYMPHATIC: No enlargements of the lymph nodes noted in the neck, axillae, or groin  ABDOMEN: Soft. No tenderness or masses. No hepatosplenomegaly. No hernias.  BREASTS: Symmetrical, no skin changes or visible lesions. No palpable masses, nipple discharge or adenopathy bilaterally.  PELVIC: Normal external female genitalia without lesions. Normal hair distribution. Adequate perineal body, normal urethral meatus. Urethra with no masses.  Bladder nontender. Vagina moist and well rugated without lesions or discharge. Cervix pink and without lesions. No significant cystocele or rectocele. Bimanual exam showed uterus normal size, shape, position, mobile and nontender. Adnexa without masses or tenderness. Urethra and bladder normal.   EXTREMITIES: No clubbing cyanosis or edema.    ASSESSMENT/PLAN:  Encounter for gynecological examination without abnormal finding    PMR (polymyalgia rheumatica)        Plan pap next year      Patient was counseled today on Pelvic exams and Pap Smear guidelines.   We discussed STD screening if at " high risk for a STD.  We discussed recommendation for breast cancer screening with mammogram every other year after the age of 40 and annually after the age of 50.    We discussed colon cancer screening when indicated.   Osteoporosis screening discussed when indicated.   She was advised to see her primary care physician for all other health maintenance.     FOLLOW-UP with me for next routine visit.

## 2019-02-02 ENCOUNTER — PATIENT MESSAGE (OUTPATIENT)
Dept: ADMINISTRATIVE | Facility: OTHER | Age: 59
End: 2019-02-02

## 2019-02-05 ENCOUNTER — OFFICE VISIT (OUTPATIENT)
Dept: OPTOMETRY | Facility: CLINIC | Age: 59
End: 2019-02-05
Payer: COMMERCIAL

## 2019-02-05 DIAGNOSIS — H25.13 NUCLEAR SCLEROSIS, BILATERAL: ICD-10-CM

## 2019-02-05 DIAGNOSIS — Z13.5 GLAUCOMA SCREENING: ICD-10-CM

## 2019-02-05 DIAGNOSIS — Z46.0 CONTACT LENS/GLASSES FITTING: ICD-10-CM

## 2019-02-05 DIAGNOSIS — H43.393 VITREOUS FLOATERS, BILATERAL: Primary | ICD-10-CM

## 2019-02-05 DIAGNOSIS — H52.203 MYOPIA WITH ASTIGMATISM AND PRESBYOPIA, BILATERAL: ICD-10-CM

## 2019-02-05 DIAGNOSIS — H52.4 MYOPIA WITH ASTIGMATISM AND PRESBYOPIA, BILATERAL: ICD-10-CM

## 2019-02-05 DIAGNOSIS — H52.13 MYOPIA WITH ASTIGMATISM AND PRESBYOPIA, BILATERAL: ICD-10-CM

## 2019-02-05 PROCEDURE — 92014 PR EYE EXAM, EST PATIENT,COMPREHESV: ICD-10-PCS | Mod: S$GLB,,, | Performed by: OPTOMETRIST

## 2019-02-05 PROCEDURE — 99999 PR PBB SHADOW E&M-EST. PATIENT-LVL III: ICD-10-PCS | Mod: PBBFAC,,, | Performed by: OPTOMETRIST

## 2019-02-05 PROCEDURE — 92014 COMPRE OPH EXAM EST PT 1/>: CPT | Mod: S$GLB,,, | Performed by: OPTOMETRIST

## 2019-02-05 PROCEDURE — 92015 DETERMINE REFRACTIVE STATE: CPT | Mod: S$GLB,,, | Performed by: OPTOMETRIST

## 2019-02-05 PROCEDURE — 92015 PR REFRACTION: ICD-10-PCS | Mod: S$GLB,,, | Performed by: OPTOMETRIST

## 2019-02-05 PROCEDURE — 99999 PR PBB SHADOW E&M-EST. PATIENT-LVL III: CPT | Mod: PBBFAC,,, | Performed by: OPTOMETRIST

## 2019-02-05 NOTE — PROGRESS NOTES
HPI     Annual Exam      Additional comments: DLE 7-19 (katelyn)   needs updated specs & clrx              Contact Lens Fit      Additional comments: unhappy w/ current clrx -- wearing multifocal   hybrids OU              Blurred Vision      Additional comments: at both near & distance              Comments     Agree above  Notes VA improved after stopping BP meds / HCTZ  Recent temp biopsy 2018 neg for GCA  Now feels VA better, but still some issues taking oral pred            Last edited by LUIZ Mckeon, OD on 2/5/2019  1:51 PM. (History)        ROS     Positive for: Eyes    Negative for: Constitutional, Gastrointestinal, Neurological, Skin,   Genitourinary, Musculoskeletal, HENT, Endocrine, Cardiovascular,   Respiratory, Psychiatric, Allergic/Imm, Heme/Lymph    Last edited by LUIZ Mckeon, OD on 2/5/2019  1:51 PM. (History)        Assessment /Plan     For exam results, see Encounter Report.    Vitreous floaters, bilateral    Nuclear sclerosis, bilateral    Glaucoma screening    Myopia with astigmatism and presbyopia, bilateral    Contact lens/glasses fitting      1. RD precautions given  2. Early changes, not vis sig for consult   3. Not suspect  4. Updated specs rx, gave copy, fill prn  5. Waiting on old records for MF rgp lenses  Will consider refit to old lens vs distance only rgp lenses    Discussed and educated patient on current findings /plan.  RTC 1 year, prn if any changes / issues    Did not charge for clfu today---waiting on old notes

## 2019-02-08 ENCOUNTER — PATIENT MESSAGE (OUTPATIENT)
Dept: CARDIOLOGY | Facility: CLINIC | Age: 59
End: 2019-02-08

## 2019-02-27 ENCOUNTER — PATIENT MESSAGE (OUTPATIENT)
Dept: FAMILY MEDICINE | Facility: CLINIC | Age: 59
End: 2019-02-27

## 2019-02-27 RX ORDER — DILTIAZEM HYDROCHLORIDE 180 MG/1
180 CAPSULE, COATED, EXTENDED RELEASE ORAL DAILY
Qty: 30 CAPSULE | Refills: 1 | Status: SHIPPED | OUTPATIENT
Start: 2019-02-27 | End: 2019-04-26 | Stop reason: SDUPTHER

## 2019-02-28 NOTE — PROGRESS NOTES
Last 5 Patient Entered Readings                                      Current 30 Day Average: 124/75     Recent Readings 2/27/2019 2/27/2019 2/20/2019 2/19/2019 2/19/2019    SBP (mmHg) 129 137 125 132 133    DBP (mmHg) 70 82 82 70 68    Pulse 54 60 65 58 57          Patient completing trial of reduced dose of diltiazem. Reporting leg pain with higher dose. States prednisone has been reduced to 1 mg.

## 2019-03-11 ENCOUNTER — PATIENT OUTREACH (OUTPATIENT)
Dept: OTHER | Facility: OTHER | Age: 59
End: 2019-03-11

## 2019-03-11 NOTE — PROGRESS NOTES
"Last 5 Patient Entered Readings                                      Current 30 Day Average: 126/77     Recent Readings 3/10/2019 3/9/2019 3/5/2019 3/3/2019 2/27/2019    SBP (mmHg) 130 134 132 129 129    DBP (mmHg) 83 83 73 81 70    Pulse 63 73 57 67 54          Digital Medicine: Health  Follow Up    Patient reports she has lost 10 pounds since the start of the year and would like to lose 40 more.  Patient reports she has lost 10 pounds due to dieting and exercising.     Lifestyle Modifications:    1.Dietary Modifications (Sodium intake <2,000mg/day, food labels, dining out): Deferred.     2.Physical Activity: Patient reports she is doing physical therapy and dry needling due to leg pain 2-3x/wk. Patient reports her physical therapist wants her to start an exercise program.     3.Medication Therapy: Patient has been compliant with the medication regimen. Patient reports she felt her BP was "to low" and asked her doctor to lower Cardizem to 180mg. Patient reports her ultimate goal is to get off medication.     4.Patient has the following medication side effects/concerns: None.       Follow up with Mrs. Brandie FROST Curtis completed. No further questions or concerns. Will continue to follow up to achieve health goals.    "

## 2019-04-02 ENCOUNTER — PATIENT MESSAGE (OUTPATIENT)
Dept: CARDIOLOGY | Facility: CLINIC | Age: 59
End: 2019-04-02

## 2019-04-02 DIAGNOSIS — Z82.49 FAMILY HISTORY OF CARDIOVASCULAR DISEASE: Chronic | ICD-10-CM

## 2019-04-02 DIAGNOSIS — I10 ESSENTIAL HYPERTENSION: Primary | ICD-10-CM

## 2019-04-20 ENCOUNTER — LAB VISIT (OUTPATIENT)
Dept: LAB | Facility: HOSPITAL | Age: 59
End: 2019-04-20
Attending: INTERNAL MEDICINE
Payer: COMMERCIAL

## 2019-04-20 DIAGNOSIS — I10 ESSENTIAL HYPERTENSION: ICD-10-CM

## 2019-04-20 DIAGNOSIS — Z82.49 FAMILY HISTORY OF CARDIOVASCULAR DISEASE: Chronic | ICD-10-CM

## 2019-04-20 LAB
ALBUMIN SERPL BCP-MCNC: 3.7 G/DL (ref 3.5–5.2)
ALP SERPL-CCNC: 94 U/L (ref 55–135)
ALT SERPL W/O P-5'-P-CCNC: 14 U/L (ref 10–44)
ANION GAP SERPL CALC-SCNC: 10 MMOL/L (ref 8–16)
AST SERPL-CCNC: 16 U/L (ref 10–40)
BILIRUB SERPL-MCNC: 0.4 MG/DL (ref 0.1–1)
BUN SERPL-MCNC: 20 MG/DL (ref 6–20)
CALCIUM SERPL-MCNC: 9.5 MG/DL (ref 8.7–10.5)
CHLORIDE SERPL-SCNC: 108 MMOL/L (ref 95–110)
CHOLEST SERPL-MCNC: 221 MG/DL (ref 120–199)
CHOLEST/HDLC SERPL: 3.6 {RATIO} (ref 2–5)
CO2 SERPL-SCNC: 26 MMOL/L (ref 23–29)
CREAT SERPL-MCNC: 0.8 MG/DL (ref 0.5–1.4)
EST. GFR  (AFRICAN AMERICAN): >60 ML/MIN/1.73 M^2
EST. GFR  (NON AFRICAN AMERICAN): >60 ML/MIN/1.73 M^2
GLUCOSE SERPL-MCNC: 85 MG/DL (ref 70–110)
HDLC SERPL-MCNC: 62 MG/DL (ref 40–75)
HDLC SERPL: 28.1 % (ref 20–50)
LDLC SERPL CALC-MCNC: 134 MG/DL (ref 63–159)
NONHDLC SERPL-MCNC: 159 MG/DL
POTASSIUM SERPL-SCNC: 4.1 MMOL/L (ref 3.5–5.1)
PROT SERPL-MCNC: 7.1 G/DL (ref 6–8.4)
SODIUM SERPL-SCNC: 144 MMOL/L (ref 136–145)
TRIGL SERPL-MCNC: 125 MG/DL (ref 30–150)

## 2019-04-20 PROCEDURE — 80053 COMPREHEN METABOLIC PANEL: CPT

## 2019-04-20 PROCEDURE — 80061 LIPID PANEL: CPT

## 2019-04-20 PROCEDURE — 36415 COLL VENOUS BLD VENIPUNCTURE: CPT | Mod: PO

## 2019-04-26 RX ORDER — DILTIAZEM HYDROCHLORIDE 180 MG/1
CAPSULE, COATED, EXTENDED RELEASE ORAL
Qty: 30 CAPSULE | Refills: 0 | OUTPATIENT
Start: 2019-04-26

## 2019-04-29 ENCOUNTER — OFFICE VISIT (OUTPATIENT)
Dept: CARDIOLOGY | Facility: CLINIC | Age: 59
End: 2019-04-29
Payer: COMMERCIAL

## 2019-04-29 VITALS
DIASTOLIC BLOOD PRESSURE: 73 MMHG | BODY MASS INDEX: 28.88 KG/M2 | WEIGHT: 179.69 LBS | HEIGHT: 66 IN | SYSTOLIC BLOOD PRESSURE: 139 MMHG | HEART RATE: 66 BPM

## 2019-04-29 DIAGNOSIS — Z82.49 FAMILY HISTORY OF CARDIOVASCULAR DISEASE: Chronic | ICD-10-CM

## 2019-04-29 DIAGNOSIS — M35.3 PMR (POLYMYALGIA RHEUMATICA): ICD-10-CM

## 2019-04-29 DIAGNOSIS — I10 ESSENTIAL HYPERTENSION: Primary | ICD-10-CM

## 2019-04-29 PROCEDURE — 99999 PR PBB SHADOW E&M-EST. PATIENT-LVL III: CPT | Mod: PBBFAC,,, | Performed by: INTERNAL MEDICINE

## 2019-04-29 PROCEDURE — 3075F PR MOST RECENT SYSTOLIC BLOOD PRESS GE 130-139MM HG: ICD-10-PCS | Mod: CPTII,S$GLB,, | Performed by: INTERNAL MEDICINE

## 2019-04-29 PROCEDURE — 3075F SYST BP GE 130 - 139MM HG: CPT | Mod: CPTII,S$GLB,, | Performed by: INTERNAL MEDICINE

## 2019-04-29 PROCEDURE — 3078F PR MOST RECENT DIASTOLIC BLOOD PRESSURE < 80 MM HG: ICD-10-PCS | Mod: CPTII,S$GLB,, | Performed by: INTERNAL MEDICINE

## 2019-04-29 PROCEDURE — 3078F DIAST BP <80 MM HG: CPT | Mod: CPTII,S$GLB,, | Performed by: INTERNAL MEDICINE

## 2019-04-29 PROCEDURE — 3008F PR BODY MASS INDEX (BMI) DOCUMENTED: ICD-10-PCS | Mod: CPTII,S$GLB,, | Performed by: INTERNAL MEDICINE

## 2019-04-29 PROCEDURE — 3008F BODY MASS INDEX DOCD: CPT | Mod: CPTII,S$GLB,, | Performed by: INTERNAL MEDICINE

## 2019-04-29 PROCEDURE — 99214 OFFICE O/P EST MOD 30 MIN: CPT | Mod: S$GLB,,, | Performed by: INTERNAL MEDICINE

## 2019-04-29 PROCEDURE — 99214 PR OFFICE/OUTPT VISIT, EST, LEVL IV, 30-39 MIN: ICD-10-PCS | Mod: S$GLB,,, | Performed by: INTERNAL MEDICINE

## 2019-04-29 PROCEDURE — 99999 PR PBB SHADOW E&M-EST. PATIENT-LVL III: ICD-10-PCS | Mod: PBBFAC,,, | Performed by: INTERNAL MEDICINE

## 2019-04-29 RX ORDER — DILTIAZEM HYDROCHLORIDE 180 MG/1
180 CAPSULE, COATED, EXTENDED RELEASE ORAL DAILY
Qty: 90 CAPSULE | Refills: 0 | Status: SHIPPED | OUTPATIENT
Start: 2019-04-29 | End: 2019-07-09 | Stop reason: SDUPTHER

## 2019-04-29 RX ORDER — PREDNISONE 1 MG/1
2 TABLET ORAL DAILY
COMMUNITY
End: 2020-09-29

## 2019-04-29 NOTE — PROGRESS NOTES
Subjective:    Patient ID:  Brandie Acevedo is a 58 y.o. female who presents for follow-up of Hypertension f/u and Medication Refill      HPI  Here for f/u HTN/fam HX CAD/PMR. Patients states is doing well no chest pain, SOB or change in exertional tolerence. Patient does not exercise but remains very active with out change in exertional tolerance or chest pain.     Review of Systems   Constitution: Negative for malaise/fatigue.   Eyes: Negative for blurred vision.   Cardiovascular: Negative for chest pain, claudication, cyanosis, dyspnea on exertion, irregular heartbeat, leg swelling, near-syncope, orthopnea, palpitations, paroxysmal nocturnal dyspnea and syncope.   Respiratory: Negative for cough and shortness of breath.    Hematologic/Lymphatic: Does not bruise/bleed easily.   Musculoskeletal: Negative for back pain, falls, joint pain, muscle cramps, muscle weakness and myalgias.   Gastrointestinal: Negative for abdominal pain, change in bowel habit, nausea and vomiting.   Genitourinary: Negative for urgency.   Neurological: Negative for dizziness, focal weakness and light-headedness.        Objective:    Physical Exam   Constitutional: She is oriented to person, place, and time. She appears well-developed and well-nourished.   HENT:   Head: Normocephalic.   Eyes: Conjunctivae are normal.   Neck: Normal range of motion. Neck supple. No JVD present.   Cardiovascular: Normal rate, regular rhythm, normal heart sounds and intact distal pulses.   Pulses:       Carotid pulses are 2+ on the right side, and 2+ on the left side.       Radial pulses are 2+ on the right side, and 2+ on the left side.        Dorsalis pedis pulses are 2+ on the right side, and 2+ on the left side.        Posterior tibial pulses are 2+ on the right side, and 2+ on the left side.   Pulmonary/Chest: Effort normal and breath sounds normal.   Abdominal: Soft. Bowel sounds are normal.   Musculoskeletal: She exhibits no edema or tenderness.    Neurological: She is alert and oriented to person, place, and time. Gait normal.   Skin: Skin is warm, dry and intact. No cyanosis. Nails show no clubbing.   Psychiatric: She has a normal mood and affect. Her speech is normal and behavior is normal. Thought content normal.   Nursing note and vitals reviewed.              ..    Chemistry        Component Value Date/Time     04/20/2019 0751    K 4.1 04/20/2019 0751     04/20/2019 0751    CO2 26 04/20/2019 0751    BUN 20 04/20/2019 0751    CREATININE 0.8 04/20/2019 0751    GLU 85 04/20/2019 0751        Component Value Date/Time    CALCIUM 9.5 04/20/2019 0751    ALKPHOS 94 04/20/2019 0751    AST 16 04/20/2019 0751    ALT 14 04/20/2019 0751    BILITOT 0.4 04/20/2019 0751    ESTGFRAFRICA >60.0 04/20/2019 0751    EGFRNONAA >60.0 04/20/2019 0751            ..  Lab Results   Component Value Date    CHOL 221 (H) 04/20/2019    CHOL 208 (H) 06/05/2018    CHOL 211 (H) 05/11/2018     Lab Results   Component Value Date    HDL 62 04/20/2019    HDL 64 06/05/2018    HDL 70 05/11/2018     Lab Results   Component Value Date    LDLCALC 134.0 04/20/2019    LDLCALC 111.4 06/05/2018    LDLCALC 108.6 05/11/2018     Lab Results   Component Value Date    TRIG 125 04/20/2019    TRIG 163 (H) 06/05/2018    TRIG 162 (H) 05/11/2018     Lab Results   Component Value Date    CHOLHDL 28.1 04/20/2019    CHOLHDL 30.8 06/05/2018    CHOLHDL 33.2 05/11/2018     ..  Lab Results   Component Value Date    WBC 6.87 06/05/2018    HGB 13.4 06/05/2018    HCT 40.0 06/05/2018    MCV 99 (H) 06/05/2018     06/05/2018       Test(s) Reviewed  I have reviewed the following in detail:  [] Stress test   [] Angiography   [x] Echocardiogram   [x] Labs   [] Other:       Assessment:         ICD-10-CM ICD-9-CM   1. Essential hypertension I10 401.9   2. Family history of cardiovascular disease Z82.49 V17.49   3. PMR (polymyalgia rheumatica) M35.3 725     Problem List Items Addressed This Visit     PMR  (polymyalgia rheumatica)    Family history of cardiovascular disease (Chronic)    Essential hypertension - Primary           Plan:           Return to clinic 1 year   Low level/low impact aerobic exercise 5x's/wk. Heart healthy diet and risk factor modification.    See labs and med orders.      Portions of this note may have been created with voice recognition software.  Grammatical, syntax and spelling errors may be inevitable.

## 2019-04-30 NOTE — TELEPHONE ENCOUNTER
Please advise which labs you require. Dr. Meek has ordered: T4 ,TSH, CMP, Lipid for 4/21/20    Spoke with pt, informed of rx sent to pharmacy  Next ov 6/6/19

## 2019-05-01 ENCOUNTER — PATIENT OUTREACH (OUTPATIENT)
Dept: OTHER | Facility: OTHER | Age: 59
End: 2019-05-01

## 2019-05-01 NOTE — PROGRESS NOTES
Last 5 Patient Entered Readings                                      Current 30 Day Average: 119/77     Recent Readings 5/1/2019 4/29/2019 4/19/2019 4/16/2019 4/13/2019    SBP (mmHg) 104 134 106 126 130    DBP (mmHg) 67 81 80 75 75    Pulse 78 67 68 73 66          Digital Medicine: Health  Follow Up    Patient is still trying to lose weight. Patient is seeing PCP, Dr. Dupree on 6/6/19.     Lifestyle Modifications:    1.Dietary Modifications (Sodium intake <2,000mg/day, food labels, dining out): Patient reports she is continuing to monitor sodium intake. Patient reports week to week can vary on eat habits depending on different events. Patient reports her overall dietary habits are good. Patient declined resources for improving dietary habits.     2.Physical Activity: Patient is still doing physical therapy sessions and dry needling. Patient reports she has seen benefits and has helped her become more active. Patient reports her physical therapist has provided her with exercises that take about 1 hour long to complete. Patient likes to exercise in the morning. Patient reports she has not been consistent with completing exercises/stretches. Encouraged patient to create a routine and set a plan to complete a handful of exercises per day.      3.Medication Therapy: Patient has been compliant with the medication regimen.    4.Patient has the following medication side effects/concerns: None.     Follow up with Mrs. Brandie FROST Curtis completed. No further questions or concerns. Will continue to follow up to achieve health goals.

## 2019-05-23 ENCOUNTER — PATIENT MESSAGE (OUTPATIENT)
Dept: FAMILY MEDICINE | Facility: CLINIC | Age: 59
End: 2019-05-23

## 2019-05-23 ENCOUNTER — PATIENT OUTREACH (OUTPATIENT)
Dept: ADMINISTRATIVE | Facility: HOSPITAL | Age: 59
End: 2019-05-23

## 2019-05-23 DIAGNOSIS — Z00.00 ROUTINE GENERAL MEDICAL EXAMINATION AT A HEALTH CARE FACILITY: Primary | ICD-10-CM

## 2019-05-23 DIAGNOSIS — E03.9 HYPOTHYROIDISM, UNSPECIFIED TYPE: ICD-10-CM

## 2019-05-23 NOTE — TELEPHONE ENCOUNTER
----- Message from Kamala Basilio sent at 5/23/2019  3:24 PM CDT -----  Contact: pt  Calling to add additional lab work and please advise [phone]

## 2019-05-23 NOTE — TELEPHONE ENCOUNTER
Cmp, lipid, tsh and t4 have been ordered by Dr Meek. Please advise any additional labs needed at this time.

## 2019-05-23 NOTE — PROGRESS NOTES
Chart review complete/scrubbed 05/23/2019  Health Maintenance Due   Topic Date Due    Pneumococcal Vaccine (Medium Risk) (1 of 1 - PPSV23) 09/28/1979   Digital Medicine Outreach.  Future Appointments   Date Time Provider Department Center   6/6/2019  8:40 AM Amie Dupree MD [facility] [place] [place]   8/6/2019  8:45 AM Amie Sands MD [facility] DERM [place]   11/7/2019  8:00 AM [facility] US3 [facility] ULSOUND [place]   11/7/2019  8:45 AM LAB, [facility] LAB [place]   4/21/2020  8:00 AM LAB, [facility] LAB [place]   4/21/2020  8:30 AM ECHO, [facility] CARDIO [place]   4/30/2020  9:00 AM David Meek MD [facility] CARDIO [place]

## 2019-05-24 ENCOUNTER — PATIENT MESSAGE (OUTPATIENT)
Dept: ADMINISTRATIVE | Facility: HOSPITAL | Age: 59
End: 2019-05-24

## 2019-05-25 ENCOUNTER — LAB VISIT (OUTPATIENT)
Dept: LAB | Facility: HOSPITAL | Age: 59
End: 2019-05-25
Attending: FAMILY MEDICINE
Payer: COMMERCIAL

## 2019-05-25 DIAGNOSIS — Z00.00 ROUTINE GENERAL MEDICAL EXAMINATION AT A HEALTH CARE FACILITY: ICD-10-CM

## 2019-05-25 DIAGNOSIS — E03.9 HYPOTHYROIDISM, UNSPECIFIED TYPE: ICD-10-CM

## 2019-05-25 LAB
25(OH)D3+25(OH)D2 SERPL-MCNC: 36 NG/ML (ref 30–96)
BASOPHILS # BLD AUTO: 0.05 K/UL (ref 0–0.2)
BASOPHILS NFR BLD: 0.8 % (ref 0–1.9)
DIFFERENTIAL METHOD: ABNORMAL
EOSINOPHIL # BLD AUTO: 0.2 K/UL (ref 0–0.5)
EOSINOPHIL NFR BLD: 3 % (ref 0–8)
ERYTHROCYTE [DISTWIDTH] IN BLOOD BY AUTOMATED COUNT: 13.4 % (ref 11.5–14.5)
HCT VFR BLD AUTO: 42 % (ref 37–48.5)
HGB BLD-MCNC: 13.3 G/DL (ref 12–16)
IMM GRANULOCYTES # BLD AUTO: 0.01 K/UL (ref 0–0.04)
IMM GRANULOCYTES NFR BLD AUTO: 0.2 % (ref 0–0.5)
LYMPHOCYTES # BLD AUTO: 2 K/UL (ref 1–4.8)
LYMPHOCYTES NFR BLD: 29.7 % (ref 18–48)
MCH RBC QN AUTO: 32.2 PG (ref 27–31)
MCHC RBC AUTO-ENTMCNC: 31.7 G/DL (ref 32–36)
MCV RBC AUTO: 102 FL (ref 82–98)
MONOCYTES # BLD AUTO: 0.5 K/UL (ref 0.3–1)
MONOCYTES NFR BLD: 8 % (ref 4–15)
NEUTROPHILS # BLD AUTO: 3.9 K/UL (ref 1.8–7.7)
NEUTROPHILS NFR BLD: 58.3 % (ref 38–73)
NRBC BLD-RTO: 0 /100 WBC
PLATELET # BLD AUTO: 327 K/UL (ref 150–350)
PMV BLD AUTO: 10.5 FL (ref 9.2–12.9)
RBC # BLD AUTO: 4.13 M/UL (ref 4–5.4)
T4 FREE SERPL-MCNC: 1.03 NG/DL (ref 0.71–1.51)
TSH SERPL DL<=0.005 MIU/L-ACNC: 1.54 UIU/ML (ref 0.4–4)
VIT B12 SERPL-MCNC: 181 PG/ML (ref 210–950)
WBC # BLD AUTO: 6.61 K/UL (ref 3.9–12.7)

## 2019-05-25 PROCEDURE — 84443 ASSAY THYROID STIM HORMONE: CPT

## 2019-05-25 PROCEDURE — 84439 ASSAY OF FREE THYROXINE: CPT

## 2019-05-25 PROCEDURE — 85025 COMPLETE CBC W/AUTO DIFF WBC: CPT

## 2019-05-25 PROCEDURE — 82607 VITAMIN B-12: CPT

## 2019-05-25 PROCEDURE — 82306 VITAMIN D 25 HYDROXY: CPT

## 2019-05-25 PROCEDURE — 36415 COLL VENOUS BLD VENIPUNCTURE: CPT | Mod: PO

## 2019-06-05 ENCOUNTER — PATIENT OUTREACH (OUTPATIENT)
Dept: OTHER | Facility: OTHER | Age: 59
End: 2019-06-05

## 2019-06-05 NOTE — PROGRESS NOTES
Last 5 Patient Entered Readings                                      Current 30 Day Average: 123/77     Recent Readings 6/2/2019 5/24/2019 5/23/2019 5/17/2019 5/15/2019    SBP (mmHg) 114 125 119 122 123    DBP (mmHg) 78 86 71 78 73    Pulse 69 66 68 74 68          Left voicemail to follow up with Ms. Brandie Acevedo.    Per 30 day average, blood pressure is well controlled 123/77 mmHg. Advised patient to call or message with questions or concerns. Follow up in 3-4 months.    Hypertension Medications             cloNIDine (CATAPRES) 0.1 MG tablet Take 1 tablet (0.1 mg total) by mouth 3 (three) times daily as needed (PRN SBP > 165 mmHg).    diltiaZEM (CARDIZEM CD) 180 MG 24 hr capsule Take 1 capsule (180 mg total) by mouth once daily.

## 2019-06-06 ENCOUNTER — OFFICE VISIT (OUTPATIENT)
Dept: FAMILY MEDICINE | Facility: CLINIC | Age: 59
End: 2019-06-06
Payer: COMMERCIAL

## 2019-06-06 VITALS
TEMPERATURE: 98 F | HEART RATE: 67 BPM | HEIGHT: 66 IN | BODY MASS INDEX: 29.16 KG/M2 | SYSTOLIC BLOOD PRESSURE: 142 MMHG | DIASTOLIC BLOOD PRESSURE: 84 MMHG | WEIGHT: 181.44 LBS | OXYGEN SATURATION: 98 %

## 2019-06-06 DIAGNOSIS — R53.83 FATIGUE, UNSPECIFIED TYPE: ICD-10-CM

## 2019-06-06 DIAGNOSIS — E53.8 B12 DEFICIENCY: Primary | ICD-10-CM

## 2019-06-06 DIAGNOSIS — Z79.52 CURRENT CHRONIC USE OF SYSTEMIC STEROIDS: ICD-10-CM

## 2019-06-06 PROCEDURE — 90732 PNEUMOCOCCAL POLYSACCHARIDE VACCINE 23-VALENT =>2YO SQ IM: ICD-10-PCS | Mod: S$GLB,,, | Performed by: FAMILY MEDICINE

## 2019-06-06 PROCEDURE — 99214 OFFICE O/P EST MOD 30 MIN: CPT | Mod: 25,S$GLB,, | Performed by: FAMILY MEDICINE

## 2019-06-06 PROCEDURE — 3077F PR MOST RECENT SYSTOLIC BLOOD PRESSURE >= 140 MM HG: ICD-10-PCS | Mod: CPTII,S$GLB,, | Performed by: FAMILY MEDICINE

## 2019-06-06 PROCEDURE — 3079F DIAST BP 80-89 MM HG: CPT | Mod: CPTII,S$GLB,, | Performed by: FAMILY MEDICINE

## 2019-06-06 PROCEDURE — 3077F SYST BP >= 140 MM HG: CPT | Mod: CPTII,S$GLB,, | Performed by: FAMILY MEDICINE

## 2019-06-06 PROCEDURE — 99999 PR PBB SHADOW E&M-EST. PATIENT-LVL III: ICD-10-PCS | Mod: PBBFAC,,, | Performed by: FAMILY MEDICINE

## 2019-06-06 PROCEDURE — 99214 PR OFFICE/OUTPT VISIT, EST, LEVL IV, 30-39 MIN: ICD-10-PCS | Mod: 25,S$GLB,, | Performed by: FAMILY MEDICINE

## 2019-06-06 PROCEDURE — 3079F PR MOST RECENT DIASTOLIC BLOOD PRESSURE 80-89 MM HG: ICD-10-PCS | Mod: CPTII,S$GLB,, | Performed by: FAMILY MEDICINE

## 2019-06-06 PROCEDURE — 90471 PNEUMOCOCCAL POLYSACCHARIDE VACCINE 23-VALENT =>2YO SQ IM: ICD-10-PCS | Mod: S$GLB,,, | Performed by: FAMILY MEDICINE

## 2019-06-06 PROCEDURE — 3008F PR BODY MASS INDEX (BMI) DOCUMENTED: ICD-10-PCS | Mod: CPTII,S$GLB,, | Performed by: FAMILY MEDICINE

## 2019-06-06 PROCEDURE — 90732 PPSV23 VACC 2 YRS+ SUBQ/IM: CPT | Mod: S$GLB,,, | Performed by: FAMILY MEDICINE

## 2019-06-06 PROCEDURE — 99999 PR PBB SHADOW E&M-EST. PATIENT-LVL III: CPT | Mod: PBBFAC,,, | Performed by: FAMILY MEDICINE

## 2019-06-06 PROCEDURE — 3008F BODY MASS INDEX DOCD: CPT | Mod: CPTII,S$GLB,, | Performed by: FAMILY MEDICINE

## 2019-06-06 PROCEDURE — 90471 IMMUNIZATION ADMIN: CPT | Mod: S$GLB,,, | Performed by: FAMILY MEDICINE

## 2019-06-06 NOTE — PROGRESS NOTES
"Subjective:       Patient ID: Brandie Acevedo is a 58 y.o. female.    Chief Complaint: Follow-up    HPI  Patient in the office for f/u and review.   Since her LOV, she had issues with HA, facial pain.   In the course of the eval, she had dx of PMR, and has been very slowly tapering her dose with rheum f/u.   Has also been working on knee with ortho/pt.     The 10-year ASCVD risk score (Stas TAYLOR Min, et al., 2013) is: 4.9%    Values used to calculate the score:      Age: 58 years      Sex: Female      Is Non- : No      Diabetic: No      Tobacco smoker: No      Systolic Blood Pressure: 142 mmHg      Is BP treated: Yes      HDL Cholesterol: 62 mg/dL      Total Cholesterol: 221 mg/dL     Review of Systems:  Review of Systems   Constitutional: Negative for activity change, fatigue (improving) and unexpected weight change.   HENT: Negative for hearing loss, rhinorrhea and trouble swallowing.    Eyes: Negative for discharge and visual disturbance.   Respiratory: Negative for chest tightness and wheezing.    Cardiovascular: Negative for chest pain, palpitations and leg swelling.   Gastrointestinal: Negative for blood in stool, constipation, diarrhea and vomiting.   Endocrine: Negative for polydipsia and polyuria.   Genitourinary: Negative for difficulty urinating, dysuria, hematuria and menstrual problem.   Musculoskeletal: Negative for arthralgias, joint swelling and neck pain.   Neurological: Positive for weakness (legs - working with ortho/pt). Negative for headaches.   Psychiatric/Behavioral: Negative for confusion and dysphoric mood.       Objective:     Vitals:    06/06/19 0844   BP: (!) 142/84   BP Location: Left arm   Patient Position: Sitting   BP Method: Large (Manual)   Pulse: 67   Temp: 98.1 °F (36.7 °C)   TempSrc: Oral   SpO2: 98%   Weight: 82.3 kg (181 lb 7 oz)   Height: 5' 6" (1.676 m)          Physical Exam   Constitutional: She is oriented to person, place, and time. She appears " well-developed and well-nourished. No distress.   HENT:   Head: Normocephalic and atraumatic.   Mouth/Throat: Oropharynx is clear and moist.   Eyes: Conjunctivae are normal. Right eye exhibits no discharge. Left eye exhibits no discharge. No scleral icterus.   Neck: Normal range of motion. Neck supple.   Cardiovascular: Normal rate and regular rhythm.   Pulmonary/Chest: Effort normal and breath sounds normal. No respiratory distress.   Abdominal: Soft. She exhibits no distension.   Musculoskeletal: Normal range of motion. She exhibits no edema.   Neurological: She is alert and oriented to person, place, and time.   Skin: Skin is warm and dry. No rash noted.   Psychiatric: She has a normal mood and affect. Her behavior is normal.   Nursing note and vitals reviewed.        Assessment & Plan:  B12 deficiency  -     CBC auto differential; Future; Expected date: 06/06/2019  -     Vitamin B12; Future; Expected date: 06/06/2019    Other orders  -     Pneumococcal Polysaccharide Vaccine (23 Valent) (SQ/IM)

## 2019-06-10 ENCOUNTER — PATIENT MESSAGE (OUTPATIENT)
Dept: NEPHROLOGY | Facility: CLINIC | Age: 59
End: 2019-06-10

## 2019-06-11 ENCOUNTER — PATIENT MESSAGE (OUTPATIENT)
Dept: NEPHROLOGY | Facility: CLINIC | Age: 59
End: 2019-06-11

## 2019-06-18 ENCOUNTER — PATIENT OUTREACH (OUTPATIENT)
Dept: OTHER | Facility: OTHER | Age: 59
End: 2019-06-18

## 2019-06-18 NOTE — PROGRESS NOTES
Last 5 Patient Entered Readings                                      Current 30 Day Average: 118/76     Recent Readings 6/14/2019 6/13/2019 6/11/2019 6/10/2019 6/2/2019    SBP (mmHg) 107 111 131 122 114    DBP (mmHg) 60 82 75 80 78    Pulse 67 72 67 75 69            Digital Medicine: Health  Follow Up    Left voicemail to follow up with Evelin Brandie MARGOT Acevedo.  Current BP average 118/76 mmHg is at goal, [130/80 mmHg].

## 2019-07-09 RX ORDER — DILTIAZEM HYDROCHLORIDE 180 MG/1
180 CAPSULE, COATED, EXTENDED RELEASE ORAL DAILY
Qty: 90 CAPSULE | Refills: 1 | Status: SHIPPED | OUTPATIENT
Start: 2019-07-09 | End: 2019-12-18 | Stop reason: SDUPTHER

## 2019-07-09 RX ORDER — DILTIAZEM HYDROCHLORIDE 180 MG/1
CAPSULE, COATED, EXTENDED RELEASE ORAL
Qty: 90 CAPSULE | Refills: 0 | OUTPATIENT
Start: 2019-07-09

## 2019-07-10 ENCOUNTER — PATIENT OUTREACH (OUTPATIENT)
Dept: ADMINISTRATIVE | Facility: HOSPITAL | Age: 59
End: 2019-07-10

## 2019-07-10 NOTE — PROGRESS NOTES
"Last 5 Patient Entered Readings                                      Current 30 Day Average: 118/75     Recent Readings 7/9/2019 7/9/2019 7/4/2019 6/29/2019 6/27/2019    SBP (mmHg) 128 140 108 112 127    DBP (mmHg) 68 78 70 69 79    Pulse 60 60 69 57 67          Digital Medicine: Health  Follow Up    Asked patient about higher readings. Patient states, "higher readings may be due to diet related". Patient reports everything is fine.     Lifestyle Modifications:    1.Dietary Modifications (Sodium intake <2,000mg/day, food labels, dining out): Encouraged patient to continue monitoring sodium intake. Patient declined resources for improving dietary habits.     2.Physical Activity: Patient stopped physical therapy. Patient reports she is still struggling with knee pian. Patient has no energy to exercise. Patient was hoping the B12 supplements would help with energy.     3.Medication Therapy: Patient has been compliant with the medication regimen. Patient is taking B12 supplements. Patient is experiencing fatigue daily. Patient is going to follow up with Dr. Dupree. Will f/u with patient next outreach.      4.Patient has the following medication side effects/concerns: None.     Other goals: Patient reports she would like to lose some weight.     Follow up with Mrs. Brandie FROST Estrellitahi completed. No further questions or concerns. Will continue to follow up to achieve health goals.    "

## 2019-07-16 ENCOUNTER — OFFICE VISIT (OUTPATIENT)
Dept: FAMILY MEDICINE | Facility: CLINIC | Age: 59
End: 2019-07-16
Payer: COMMERCIAL

## 2019-07-16 VITALS
HEIGHT: 66 IN | BODY MASS INDEX: 29.09 KG/M2 | WEIGHT: 181 LBS | HEART RATE: 68 BPM | DIASTOLIC BLOOD PRESSURE: 82 MMHG | SYSTOLIC BLOOD PRESSURE: 158 MMHG | TEMPERATURE: 98 F | OXYGEN SATURATION: 95 %

## 2019-07-16 DIAGNOSIS — E53.8 B12 DEFICIENCY: Primary | ICD-10-CM

## 2019-07-16 DIAGNOSIS — E53.9 VITAMIN B DEFICIENCY: ICD-10-CM

## 2019-07-16 PROCEDURE — 96372 PR INJECTION,THERAP/PROPH/DIAG2ST, IM OR SUBCUT: ICD-10-PCS | Mod: S$GLB,,, | Performed by: NURSE PRACTITIONER

## 2019-07-16 PROCEDURE — 3008F BODY MASS INDEX DOCD: CPT | Mod: CPTII,S$GLB,, | Performed by: NURSE PRACTITIONER

## 2019-07-16 PROCEDURE — 3077F SYST BP >= 140 MM HG: CPT | Mod: CPTII,S$GLB,, | Performed by: NURSE PRACTITIONER

## 2019-07-16 PROCEDURE — 3079F PR MOST RECENT DIASTOLIC BLOOD PRESSURE 80-89 MM HG: ICD-10-PCS | Mod: CPTII,S$GLB,, | Performed by: NURSE PRACTITIONER

## 2019-07-16 PROCEDURE — 99213 OFFICE O/P EST LOW 20 MIN: CPT | Mod: 25,S$GLB,, | Performed by: NURSE PRACTITIONER

## 2019-07-16 PROCEDURE — 3077F PR MOST RECENT SYSTOLIC BLOOD PRESSURE >= 140 MM HG: ICD-10-PCS | Mod: CPTII,S$GLB,, | Performed by: NURSE PRACTITIONER

## 2019-07-16 PROCEDURE — 3008F PR BODY MASS INDEX (BMI) DOCUMENTED: ICD-10-PCS | Mod: CPTII,S$GLB,, | Performed by: NURSE PRACTITIONER

## 2019-07-16 PROCEDURE — 96372 THER/PROPH/DIAG INJ SC/IM: CPT | Mod: S$GLB,,, | Performed by: NURSE PRACTITIONER

## 2019-07-16 PROCEDURE — 99213 PR OFFICE/OUTPT VISIT, EST, LEVL III, 20-29 MIN: ICD-10-PCS | Mod: 25,S$GLB,, | Performed by: NURSE PRACTITIONER

## 2019-07-16 PROCEDURE — 99999 PR PBB SHADOW E&M-EST. PATIENT-LVL IV: CPT | Mod: PBBFAC,,, | Performed by: NURSE PRACTITIONER

## 2019-07-16 PROCEDURE — 3079F DIAST BP 80-89 MM HG: CPT | Mod: CPTII,S$GLB,, | Performed by: NURSE PRACTITIONER

## 2019-07-16 PROCEDURE — 99999 PR PBB SHADOW E&M-EST. PATIENT-LVL IV: ICD-10-PCS | Mod: PBBFAC,,, | Performed by: NURSE PRACTITIONER

## 2019-07-16 RX ORDER — CYANOCOBALAMIN 1000 UG/ML
1000 INJECTION, SOLUTION INTRAMUSCULAR; SUBCUTANEOUS
Status: SHIPPED | OUTPATIENT
Start: 2019-07-16 | End: 2020-01-12

## 2019-07-16 RX ADMIN — CYANOCOBALAMIN 1000 MCG: 1000 INJECTION, SOLUTION INTRAMUSCULAR; SUBCUTANEOUS at 04:07

## 2019-07-17 NOTE — PROGRESS NOTES
This dictation has been generated using Modal Fluency Dictation some phonetic errors may occur. Please contact author for clarification if needed.     Problem List Items Addressed This Visit     Vitamin B deficiency      Other Visit Diagnoses     B12 deficiency    -  Primary    Relevant Medications    cyanocobalamin injection 1,000 mcg          Orders Placed This Encounter    cyanocobalamin injection 1,000 mcg     Vitamin B deficiency.  Taking meds as directed.  Does not note any improvement and symptomatology.  She is desirous of injection.    Follow up in about 6 months (around 1/16/2020).    ________________________________________________________________  ________________________________________________________________      Chief Complaint   Patient presents with    Follow-up     pt states over the counter Vit b12 med is not working, still feeling tired, hands and feet pins and needles     History of present illness  This 58 y.o. presents today for complaint of vitamin-B deficiency documented in chart.  Patient notes fatigue.  She is taking the supplement and notes continued fatigue and worsening.  She is requesting injection of vitamin B.  She has never given her cell shots.  Complete review of systems otherwise negative  Answers for HPI/ROS submitted by the patient on 7/16/2019   activity change: Yes  unexpected weight change: No  neck pain: No  hearing loss: No  rhinorrhea: No  trouble swallowing: No  eye discharge: No  visual disturbance: No  chest tightness: No  wheezing: No  chest pain: No  palpitations: No  blood in stool: No  constipation: No  vomiting: No  diarrhea: No  polydipsia: No  polyuria: No  difficulty urinating: No  hematuria: No  menstrual problem: No  dysuria: No  joint swelling: Yes  arthralgias: Yes  headaches: Yes  weakness: Yes  confusion: No  dysphoric mood: Yes    Past medical and social history reviewed.  Patient new to me.    Past Medical History:   Diagnosis Date    Abnormal Pap  smear of cervix     ascus     Cataract     Dizziness     DJD (degenerative joint disease) of cervical spine     DJD (degenerative joint disease) of cervical spine     Hemorrhoids     HTN (hypertension)     Hypothyroidism     Polyarthritis rheumatica 2016    Polymyalgia        Past Surgical History:   Procedure Laterality Date    BIOPSY, ARTERY, TEMPORAL Right 2018    Per[facility]    cervical fusion       SECTION      COLONOSCOPY      COSMETIC SURGERY      rhinoplasty    DEXA      ,     KNEE ARTHROSCOPY W/ ACL RECONSTRUCTION Left     RHINOPLASTY TIP      WISDOM TOOTH EXTRACTION         Family History   Problem Relation Age of Onset    Heart failure Mother     Heart disease Mother     Osteoporosis Mother     Thyroid disease Mother     Heart failure Father     Heart disease Father     Hypertension Brother     Diabetes Maternal Grandfather     Breast cancer Neg Hx     Ovarian cancer Neg Hx        Social History     Socioeconomic History    Marital status:      Spouse name: Not on file    Number of children: 1    Years of education: Not on file    Highest education level: Not on file   Occupational History     Employer: [employer]   Social Needs    Financial resource strain: Not hard at all    Food insecurity:     Worry: Never true     Inability: Never true    Transportation needs:     Medical: No     Non-medical: No   Tobacco Use    Smoking status: Never Smoker    Smokeless tobacco: Never Used   Substance and Sexual Activity    Alcohol use: Yes     Alcohol/week: 1.0 oz     Types: 2 Standard drinks or equivalent per week     Frequency: 2-4 times a month     Drinks per session: 1 or 2     Binge frequency: Never     Comment: less frequent on mtx    Drug use: No    Sexual activity: Yes     Partners: Male     Birth control/protection: None, Post-menopausal   Lifestyle    Physical activity:     Days  per week: 0 days     Minutes per session: 0 min    Stress: Only a little   Relationships    Social connections:     Talks on phone: Once a week     Gets together: Once a week     Attends [employer] service: Not on file     Active member of club or organization: No     Attends meetings of clubs or organizations: Not on file     Relationship status:    Other Topics Concern    Not on file   Social History Narrative    Works as an , originally from .       Current Outpatient Medications   Medication Sig Dispense Refill    ascorbic acid, vitamin C, (VITAMIN C) 100 MG tablet Take 600 mg by mouth once daily.      CALCIUM-MAGNESIUM-VITAMIN D2 ORAL Take by mouth once daily.      cyanocobalamin-cobamamide (B12) 5,000-100 mcg Lozg       diltiaZEM (CARDIZEM CD) 180 MG 24 hr capsule Take 1 capsule (180 mg total) by mouth once daily. 90 capsule 1    fish oil-omega-3 fatty acids 300-1,000 mg capsule Take by mouth once daily.      folic acid (FOLVITE) 1 MG tablet 1 TABLET BY MOUTH ONCE A DAY.  3    methotrexate 2.5 MG Tab 4 TABS BY MOUTH ONCE WEEKLY ALL AT THE SAME TIME.  3    multivitamin (THERAGRAN) per tablet Take 1 tablet by mouth once daily.      predniSONE (DELTASONE) 1 MG tablet Take 4 mg by mouth once daily.      SYNTHROID 50 mcg tablet TAKE 1 TABLET BY MOUTH EVERY MONDAY, TUESDAY, THURSDAY,FRIDAY,AND SUNDAY 30 tablet 11    SYNTHROID 75 mcg tablet TAKE 1 TABLET BY MOUTH TWICE A WEEK. 30 tablet 9    cloNIDine (CATAPRES) 0.1 MG tablet Take 1 tablet (0.1 mg total) by mouth 3 (three) times daily as needed (PRN SBP > 165 mmHg). 90 tablet 6     Current Facility-Administered Medications   Medication Dose Route Frequency Provider Last Rate Last Dose    cyanocobalamin injection 1,000 mcg  1,000 mcg Intramuscular Q30 Days Harrison Ayala NP   1,000 mcg at 07/16/19 1611       Review of patient's allergies indicates:   Allergen Reactions    Iodine      Other reaction(s): Rash       Physical  examination  Vitals Reviewed  Gen. Well-dressed well-nourished   Skin warm dry and intact.  No rashes noted.  Chest.  Respirations are even unlabored.  Lungs are clear to auscultation.  Cardiac regular rate and rhythm.  No chest wall adenopathy noted.  Neuro. Awake alert oriented x4.  Normal judgment and cognition noted.  Extremities no clubbing cyanosis or edema noted.     Call or return to clinic prn if these symptoms worsen or fail to improve as anticipated.

## 2019-07-18 ENCOUNTER — PATIENT MESSAGE (OUTPATIENT)
Dept: FAMILY MEDICINE | Facility: CLINIC | Age: 59
End: 2019-07-18

## 2019-07-18 ENCOUNTER — LAB VISIT (OUTPATIENT)
Dept: LAB | Facility: HOSPITAL | Age: 59
End: 2019-07-18
Attending: NURSE PRACTITIONER
Payer: COMMERCIAL

## 2019-07-18 ENCOUNTER — OFFICE VISIT (OUTPATIENT)
Dept: FAMILY MEDICINE | Facility: CLINIC | Age: 59
End: 2019-07-18
Payer: COMMERCIAL

## 2019-07-18 VITALS
SYSTOLIC BLOOD PRESSURE: 130 MMHG | BODY MASS INDEX: 29.09 KG/M2 | DIASTOLIC BLOOD PRESSURE: 92 MMHG | OXYGEN SATURATION: 97 % | HEART RATE: 68 BPM | WEIGHT: 181 LBS | HEIGHT: 66 IN | TEMPERATURE: 98 F

## 2019-07-18 DIAGNOSIS — E53.8 B12 DEFICIENCY: Primary | ICD-10-CM

## 2019-07-18 DIAGNOSIS — E53.8 B12 DEFICIENCY: ICD-10-CM

## 2019-07-18 LAB
FOLATE SERPL-MCNC: 23.7 NG/ML (ref 4–24)
HCYS SERPL-SCNC: 7.9 UMOL/L (ref 4–15.5)

## 2019-07-18 PROCEDURE — 99213 OFFICE O/P EST LOW 20 MIN: CPT | Mod: S$GLB,,, | Performed by: NURSE PRACTITIONER

## 2019-07-18 PROCEDURE — 99999 PR PBB SHADOW E&M-EST. PATIENT-LVL IV: CPT | Mod: PBBFAC,,, | Performed by: NURSE PRACTITIONER

## 2019-07-18 PROCEDURE — 82746 ASSAY OF FOLIC ACID SERUM: CPT

## 2019-07-18 PROCEDURE — 99213 PR OFFICE/OUTPT VISIT, EST, LEVL III, 20-29 MIN: ICD-10-PCS | Mod: S$GLB,,, | Performed by: NURSE PRACTITIONER

## 2019-07-18 PROCEDURE — 99999 PR PBB SHADOW E&M-EST. PATIENT-LVL IV: ICD-10-PCS | Mod: PBBFAC,,, | Performed by: NURSE PRACTITIONER

## 2019-07-18 PROCEDURE — 83921 ORGANIC ACID SINGLE QUANT: CPT

## 2019-07-18 PROCEDURE — 36415 COLL VENOUS BLD VENIPUNCTURE: CPT | Mod: PN

## 2019-07-18 PROCEDURE — 83090 ASSAY OF HOMOCYSTEINE: CPT

## 2019-07-18 RX ORDER — CYANOCOBALAMIN 1000 UG/ML
100 INJECTION, SOLUTION INTRAMUSCULAR; SUBCUTANEOUS DAILY
Qty: 10 ML | Refills: 0 | Status: SHIPPED | OUTPATIENT
Start: 2019-07-18 | End: 2021-06-03

## 2019-07-18 RX ORDER — SYRINGE, DISPOSABLE, 3 ML
SYRINGE, EMPTY DISPOSABLE MISCELLANEOUS
Qty: 30 EACH | Refills: 6 | Status: SHIPPED | OUTPATIENT
Start: 2019-07-18 | End: 2019-11-11

## 2019-07-18 NOTE — PROGRESS NOTES
"This dictation has been generated using Modal Fluency Dictation some phonetic errors may occur. Please contact author for clarification if needed.     Problem List Items Addressed This Visit     None      Visit Diagnoses     B12 deficiency    -  Primary    Relevant Orders    Methylmalonic acid, serum    Folate    Homocysteine, serum        Orders Placed This Encounter    Methylmalonic acid, serum    Folate    Homocysteine, serum    cyanocobalamin 1,000 mcg/mL injection    syringe, disposable, 3 mL Syrg    safety needles (EASY TOUCH FLIPLOCK NEEDLE) 25 gauge x 1 1/2" Ndle     Vitamin B deficiency check labs as above.  typical dose for adults is 1000 mcg parenterally once per week until the deficiency is corrected and then once per month (cyanocobalamin) or once every other month (hydroxocobalamin); in adults, oral dosing is equally effective, at a dose of 1000 mcg orally once per day." UTD 2019   So the usual doses 1000 mcg given weekly until deficiency is corrected and then once per month.     No follow-ups on file.    ________________________________________________________________  ________________________________________________________________      Chief Complaint   Patient presents with    Follow-up     b12 deficiency,leg pain     History of present illness  This 58 y.o. presents today for complaint of following up on B12 injection.  Patient interested in getting daily injection but we reviewed research backed recommendations.  She does not have anemia but does have low B12 levels.  We need to check other labs.  Discussed current folic acid utilization due to methotrexate therapy to avoid toxicity.In excessive of 15 minutes spent with patient with greater than 50% of time dedicated to education on symptoms, diagnosis, treatments, and coordination of care..  I have reviewed the side effects, risk, and benefits of the medications prescribed at today's visit. Patient verbalized understanding.     Review of " systems  Normal except for myalgias joint pains.  She does have headaches.  No other focal neurologic change.    Past Medical History:   Diagnosis Date    Abnormal Pap smear of cervix     ascus     Cataract     Dizziness     DJD (degenerative joint disease) of cervical spine     DJD (degenerative joint disease) of cervical spine     Hemorrhoids     HTN (hypertension)     Hypothyroidism     Polyarthritis rheumatica 2016    Polymyalgia        Past Surgical History:   Procedure Laterality Date    BIOPSY, ARTERY, TEMPORAL Right 2018    Performed by [facility] at [facility]    cervical fusion       SECTION      COLONOSCOPY      COSMETIC SURGERY      rhinoplasty    DEXA      ,     KNEE ARTHROSCOPY W/ ACL RECONSTRUCTION Left     RHINOPLASTY TIP      WISDOM TOOTH EXTRACTION         Family History   Problem Relation Age of Onset    Heart failure Mother     Heart disease Mother     Osteoporosis Mother     Thyroid disease Mother     Heart failure Father     Heart disease Father     Hypertension Brother     Diabetes Maternal Grandfather     Breast cancer Neg Hx     Ovarian cancer Neg Hx        Social History     Socioeconomic History    Marital status:      Spouse name: Not on file    Number of children: 1    Years of education: Not on file    Highest education level: Not on file   Occupational History     Employer: [employer]   Social Needs    Financial resource strain: Not hard at all    Food insecurity:     Worry: Never true     Inability: Never true    Transportation needs:     Medical: No     Non-medical: No   Tobacco Use    Smoking status: Never Smoker    Smokeless tobacco: Never Used   Substance and Sexual Activity    Alcohol use: Yes     Alcohol/week: 1.0 oz     Types: 2 Standard drinks or equivalent per week     Frequency: 2-4 times a month     Drinks per session: 1 or 2     Binge frequency: Never     Comment: less  "frequent on mtx    Drug use: No    Sexual activity: Yes     Partners: Male     Birth control/protection: None, Post-menopausal   Lifestyle    Physical activity:     Days per week: 0 days     Minutes per session: 0 min    Stress: Only a little   Relationships    Social connections:     Talks on phone: Once a week     Gets together: Once a week     Attends [employer] service: Not on file     Active member of club or organization: No     Attends meetings of clubs or organizations: Not on file     Relationship status:    Other Topics Concern    Not on file   Social History Narrative    Works as an , originally from .       Current Outpatient Medications   Medication Sig Dispense Refill    ascorbic acid, vitamin C, (VITAMIN C) 100 MG tablet Take 600 mg by mouth once daily.      CALCIUM-MAGNESIUM-VITAMIN D2 ORAL Take by mouth once daily.      cloNIDine (CATAPRES) 0.1 MG tablet Take 1 tablet (0.1 mg total) by mouth 3 (three) times daily as needed (PRN SBP > 165 mmHg). 90 tablet 6    cyanocobalamin-cobamamide (B12) 5,000-100 mcg Lozg       diltiaZEM (CARDIZEM CD) 180 MG 24 hr capsule Take 1 capsule (180 mg total) by mouth once daily. 90 capsule 1    fish oil-omega-3 fatty acids 300-1,000 mg capsule Take by mouth once daily.      folic acid (FOLVITE) 1 MG tablet 1 TABLET BY MOUTH ONCE A DAY.  3    methotrexate 2.5 MG Tab 4 TABS BY MOUTH ONCE WEEKLY ALL AT THE SAME TIME.  3    multivitamin (THERAGRAN) per tablet Take 1 tablet by mouth once daily.      predniSONE (DELTASONE) 1 MG tablet Take 4 mg by mouth once daily.      SYNTHROID 50 mcg tablet TAKE 1 TABLET BY MOUTH EVERY MONDAY, TUESDAY, THURSDAY,FRIDAY,AND SUNDAY 30 tablet 11    SYNTHROID 75 mcg tablet TAKE 1 TABLET BY MOUTH TWICE A WEEK. 30 tablet 9    cyanocobalamin 1,000 mcg/mL injection Inject 0.1 mLs (100 mcg total) into the muscle once daily. 10 mL 0    safety needles (EASY TOUCH FLIPLOCK NEEDLE) 25 gauge x 1 1/2" Ndle b12 " injection as directed 50 each 6    syringe, disposable, 3 mL Syrg Daily injection 30 each 6     Current Facility-Administered Medications   Medication Dose Route Frequency Provider Last Rate Last Dose    cyanocobalamin injection 1,000 mcg  1,000 mcg Intramuscular Q30 Days Harrison Ayala NP   1,000 mcg at 07/16/19 1611       Review of patient's allergies indicates:   Allergen Reactions    Iodine      Other reaction(s): Rash       Physical examination  Vitals Reviewed  Gen. Well-dressed well-nourished   Skin warm dry and intact.  No rashes noted.  HEENT.  TM intact bilateral with normal light reflex.  No mastoid tenderness during percussion.  Nares patent bilateral.  Pharynx is unremarkable.  No maxillary or frontal sinus tenderness when percussed.    Neck is supple without adenopathy  Chest.  Respirations are even unlabored.  Lungs are clear to auscultation.  Cardiac regular rate and rhythm.  No chest wall adenopathy noted.  Neuro. Awake alert oriented x4.  Normal judgment and cognition noted.  Extremities no clubbing cyanosis or edema noted.     Call or return to clinic prn if these symptoms worsen or fail to improve as anticipated.    Answers for HPI/ROS submitted by the patient on 7/18/2019   activity change: No  unexpected weight change: No  neck pain: No  hearing loss: No  rhinorrhea: No  trouble swallowing: No  eye discharge: No  visual disturbance: No  chest tightness: No  wheezing: No  chest pain: No  palpitations: No  blood in stool: No  constipation: No  vomiting: No  diarrhea: No  polydipsia: No  polyuria: No  difficulty urinating: No  hematuria: No  menstrual problem: No  dysuria: No  joint swelling: Yes  arthralgias: Yes  headaches: Yes  weakness: Yes  confusion: No  dysphoric mood: Yes  I placed an order for clinic administered Vit B12 weekly.

## 2019-07-23 LAB — METHYLMALONATE SERPL-SCNC: 0.15 UMOL/L

## 2019-07-29 ENCOUNTER — PATIENT MESSAGE (OUTPATIENT)
Dept: FAMILY MEDICINE | Facility: CLINIC | Age: 59
End: 2019-07-29

## 2019-07-29 ENCOUNTER — TELEPHONE (OUTPATIENT)
Dept: FAMILY MEDICINE | Facility: CLINIC | Age: 59
End: 2019-07-29

## 2019-07-29 ENCOUNTER — CLINICAL SUPPORT (OUTPATIENT)
Dept: FAMILY MEDICINE | Facility: CLINIC | Age: 59
End: 2019-07-29
Payer: COMMERCIAL

## 2019-07-29 DIAGNOSIS — E53.9 VITAMIN B DEFICIENCY: ICD-10-CM

## 2019-07-29 DIAGNOSIS — E53.8 B12 DEFICIENCY: Primary | ICD-10-CM

## 2019-07-29 PROCEDURE — 96372 PR INJECTION,THERAP/PROPH/DIAG2ST, IM OR SUBCUT: ICD-10-PCS | Mod: S$GLB,,, | Performed by: FAMILY MEDICINE

## 2019-07-29 PROCEDURE — 96372 THER/PROPH/DIAG INJ SC/IM: CPT | Mod: S$GLB,,, | Performed by: FAMILY MEDICINE

## 2019-07-29 RX ORDER — CYANOCOBALAMIN 1000 UG/ML
1000 INJECTION, SOLUTION INTRAMUSCULAR; SUBCUTANEOUS
Status: CANCELLED | OUTPATIENT
Start: 2019-07-29

## 2019-07-29 RX ORDER — CYANOCOBALAMIN 1000 UG/ML
100 INJECTION, SOLUTION INTRAMUSCULAR; SUBCUTANEOUS WEEKLY
Status: SHIPPED | OUTPATIENT
Start: 2019-07-29 | End: 2019-08-19

## 2019-07-29 RX ORDER — CYANOCOBALAMIN 1000 UG/ML
1000 INJECTION, SOLUTION INTRAMUSCULAR; SUBCUTANEOUS WEEKLY
Status: SHIPPED | OUTPATIENT
Start: 2019-07-30 | End: 2019-09-24

## 2019-07-29 RX ADMIN — CYANOCOBALAMIN 1000 MCG: 1000 INJECTION, SOLUTION INTRAMUSCULAR; SUBCUTANEOUS at 03:07

## 2019-07-29 NOTE — TELEPHONE ENCOUNTER
In [place] , can you pls address. Pt has nurse appt this afternoon .   See email 7-18-19 , pt needs B 12 injections weekly until deficiency is resolved . Order was entered incorrectly . Pt needs B 12 1000mcg weekly . Can you enter correct order?  Thx --lp

## 2019-08-05 ENCOUNTER — CLINICAL SUPPORT (OUTPATIENT)
Dept: FAMILY MEDICINE | Facility: CLINIC | Age: 59
End: 2019-08-05
Payer: COMMERCIAL

## 2019-08-05 DIAGNOSIS — E53.9 VITAMIN B DEFICIENCY: ICD-10-CM

## 2019-08-05 PROCEDURE — 96372 PR INJECTION,THERAP/PROPH/DIAG2ST, IM OR SUBCUT: ICD-10-PCS | Mod: S$GLB,,, | Performed by: FAMILY MEDICINE

## 2019-08-05 PROCEDURE — 96372 THER/PROPH/DIAG INJ SC/IM: CPT | Mod: S$GLB,,, | Performed by: FAMILY MEDICINE

## 2019-08-05 RX ADMIN — CYANOCOBALAMIN 1000 MCG: 1000 INJECTION, SOLUTION INTRAMUSCULAR; SUBCUTANEOUS at 08:08

## 2019-08-05 NOTE — PROGRESS NOTES
2 pt identifiers used, pt presented to clinic for B12 inj.  Verified by Amy Batista LPN.  Site cleansed with alcohol prep pad and 2x2 gauze.   Inj administered, band-aid applied.   Pt tolerated well.

## 2019-08-06 ENCOUNTER — OFFICE VISIT (OUTPATIENT)
Dept: DERMATOLOGY | Facility: CLINIC | Age: 59
End: 2019-08-06
Payer: COMMERCIAL

## 2019-08-06 VITALS — RESPIRATION RATE: 18 BRPM | BODY MASS INDEX: 29.09 KG/M2 | HEIGHT: 66 IN | WEIGHT: 181 LBS

## 2019-08-06 DIAGNOSIS — L82.1 SEBORRHEIC KERATOSES: Primary | ICD-10-CM

## 2019-08-06 DIAGNOSIS — D22.9 MULTIPLE BENIGN NEVI: ICD-10-CM

## 2019-08-06 DIAGNOSIS — Z12.83 SKIN CANCER SCREENING: ICD-10-CM

## 2019-08-06 DIAGNOSIS — L92.0 GRANULOMA ANNULARE: ICD-10-CM

## 2019-08-06 PROCEDURE — 99214 PR OFFICE/OUTPT VISIT, EST, LEVL IV, 30-39 MIN: ICD-10-PCS | Mod: 25,S$GLB,, | Performed by: DERMATOLOGY

## 2019-08-06 PROCEDURE — 3008F PR BODY MASS INDEX (BMI) DOCUMENTED: ICD-10-PCS | Mod: CPTII,S$GLB,, | Performed by: DERMATOLOGY

## 2019-08-06 PROCEDURE — 11900 PR INJECTION INTO SKIN LESIONS, UP TO 7: ICD-10-PCS | Mod: S$GLB,,, | Performed by: DERMATOLOGY

## 2019-08-06 PROCEDURE — 11900 INJECT SKIN LESIONS </W 7: CPT | Mod: S$GLB,,, | Performed by: DERMATOLOGY

## 2019-08-06 PROCEDURE — 3008F BODY MASS INDEX DOCD: CPT | Mod: CPTII,S$GLB,, | Performed by: DERMATOLOGY

## 2019-08-06 PROCEDURE — 99999 PR PBB SHADOW E&M-EST. PATIENT-LVL II: ICD-10-PCS | Mod: PBBFAC,,, | Performed by: DERMATOLOGY

## 2019-08-06 PROCEDURE — 99214 OFFICE O/P EST MOD 30 MIN: CPT | Mod: 25,S$GLB,, | Performed by: DERMATOLOGY

## 2019-08-06 PROCEDURE — 99999 PR PBB SHADOW E&M-EST. PATIENT-LVL II: CPT | Mod: PBBFAC,,, | Performed by: DERMATOLOGY

## 2019-08-06 NOTE — PROGRESS NOTES
Subjective:       Patient ID:  Brandie Acevedo is a 58 y.o. female who presents for No chief complaint on file.    .Patient last seen 08,21/2019.  Patient here today for  Annual skin check.  .     ***    {YES/NO:20267} Phx of NMSC.  {YES/NO:20267} Fhx of melanoma.    Past Medical History:  No date: Abnormal Pap smear of cervix      Comment:  ascus 2013  No date: Cataract  No date: Dizziness  No date: DJD (degenerative joint disease) of cervical spine  No date: DJD (degenerative joint disease) of cervical spine  No date: Hemorrhoids  No date: HTN (hypertension)  No date: Hypothyroidism  2016: Polyarthritis rheumatica  No date: Polymyalgia        Review of Systems   Skin: Positive for activity-related sunscreen use. Negative for daily sunscreen use, sensitivity to antibiotic ointment, sensitivity to bandage adhesive, tendency to form keloidal scars and recent sunburn.   Hematologic/Lymphatic: Bruises/bleeds easily.        Objective:    Physical Exam       Diagram Legend     Erythematous scaling macule/papule c/w actinic keratosis       Vascular papule c/w angioma      Pigmented verrucoid papule/plaque c/w seborrheic keratosis      Yellow umbilicated papule c/w sebaceous hyperplasia      Irregularly shaped tan macule c/w lentigo     1-2 mm smooth white papules consistent with Milia      Movable subcutaneous cyst with punctum c/w epidermal inclusion cyst      Subcutaneous movable cyst c/w pilar cyst      Firm pink to brown papule c/w dermatofibroma      Pedunculated fleshy papule(s) c/w skin tag(s)      Evenly pigmented macule c/w junctional nevus     Mildly variegated pigmented, slightly irregular-bordered macule c/w mildly atypical nevus      Flesh colored to evenly pigmented papule c/w intradermal nevus       Pink pearly papule/plaque c/w basal cell carcinoma      Erythematous hyperkeratotic cursted plaque c/w SCC      Surgical scar with no sign of skin cancer recurrence      Open and closed comedones       Inflammatory papules and pustules      Verrucoid papule consistent consistent with wart     Erythematous eczematous patches and plaques     Dystrophic onycholytic nail with subungual debris c/w onychomycosis     Umbilicated papule    Erythematous-base heme-crusted tan verrucoid plaque consistent with inflamed seborrheic keratosis     Erythematous Silvery Scaling Plaque c/w Psoriasis     See annotation      Assessment / Plan:        There are no diagnoses linked to this encounter.         No follow-ups on file.

## 2019-08-06 NOTE — PROGRESS NOTES
Subjective:       Patient ID:  Brandie Acevedo is a 58 y.o. female who presents for   Chief Complaint   Patient presents with    Skin Check     Present for FBSE.  Lesion to left elbow x 2-3 months. States area has gotten larger. Not treating.  Lesion to R posterior calf x 2-3 years. States that area is started to get scaly and slightly larger. Not treating.     No phx skin ca  Father had skin ca - type unknown     Past Medical History:  No date: Abnormal Pap smear of cervix      Comment:  ascus 2013  No date: Cataract  No date: Dizziness  No date: DJD (degenerative joint disease) of cervical spine  No date: DJD (degenerative joint disease) of cervical spine  No date: Hemorrhoids  No date: HTN (hypertension)  No date: Hypothyroidism  2016: Polyarthritis rheumatica  No date: Polymyalgia      History PMR and recently diagnosed B12 deficiency      Review of Systems   Constitutional: Negative for fever.   HENT: Negative for sore throat.    Respiratory: Negative for cough.    Gastrointestinal: Negative for nausea and vomiting.   Skin: Positive for activity-related sunscreen use and wears hat. Negative for itching, rash, dry skin and daily sunscreen use.   Hematologic/Lymphatic: Does not bruise/bleed easily.        Objective:    Physical Exam   Constitutional: She appears well-developed and well-nourished. No distress.   HENT:   Mouth/Throat: Lips normal.    Eyes: Lids are normal.  No conjunctival no injection.   Cardiovascular: There is no local extremity swelling and no dependent edema.     Neurological: She is alert and oriented to person, place, and time. She is not disoriented.   Psychiatric: She has a normal mood and affect.   Skin:   Areas Examined (abnormalities noted in diagram):   Scalp / Hair Palpated and Inspected  Head / Face Inspection Performed  Neck Inspection Performed  Chest / Axilla Inspection Performed  Abdomen Inspection Performed  Back Inspection Performed  RUE Inspected  LUE Inspection  Performed  RLE Inspected  LLE Inspection Performed  Nails and Digits Inspection Performed                   Diagram Legend     Erythematous scaling macule/papule c/w actinic keratosis       Vascular papule c/w angioma      Pigmented verrucoid papule/plaque c/w seborrheic keratosis      Yellow umbilicated papule c/w sebaceous hyperplasia      Irregularly shaped tan macule c/w lentigo     1-2 mm smooth white papules consistent with Milia      Movable subcutaneous cyst with punctum c/w epidermal inclusion cyst      Subcutaneous movable cyst c/w pilar cyst      Firm pink to brown papule c/w dermatofibroma      Pedunculated fleshy papule(s) c/w skin tag(s)      Evenly pigmented macule c/w junctional nevus     Mildly variegated pigmented, slightly irregular-bordered macule c/w mildly atypical nevus      Flesh colored to evenly pigmented papule c/w intradermal nevus       Pink pearly papule/plaque c/w basal cell carcinoma      Erythematous hyperkeratotic cursted plaque c/w SCC      Surgical scar with no sign of skin cancer recurrence      Open and closed comedones      Inflammatory papules and pustules      Verrucoid papule consistent consistent with wart     Erythematous eczematous patches and plaques     Dystrophic onycholytic nail with subungual debris c/w onychomycosis     Umbilicated papule    Erythematous-base heme-crusted tan verrucoid plaque consistent with inflamed seborrheic keratosis     Erythematous Silvery Scaling Plaque c/w Psoriasis     See annotation      Assessment / Plan:        Seborrheic keratoses, leg  These are benign inherited growths without a malignant potential. Reassurance given to patient. No treatment is necessary.       Granuloma annulare  History thyroid disease on synthroid  TSH wnl 5/2019  -     triamcinolone acetonide injection 5 mg  Intralesional Kenalog 5mg/cc (0.2 cc total) injected into 1 lesions on the left elbow today after obtaining verbal consent including risk of surrounding  hypopigmentation. Patient tolerated procedure well.    Units: 1  NDC for Kenalog 10mg/cc:  2409-9894-30            Skin cancer screening  Area of previous melanoma examined. Site well healed with no signs of recurrence.    Total body skin examination performed today including at least 12 points as noted in physical examination. No lesions suspicious for malignancy noted.      Total body skin examination performed today including at least 12 points as noted in physical examination. No lesions suspicious for malignancy noted.      Multiple benign nevi  Discussed ABCDE's of nevi.  Monitor for new mole or moles that are becoming bigger, darker, irritated, or developing irregular borders.              Follow up in about 1 year (around 8/6/2020).

## 2019-08-12 ENCOUNTER — CLINICAL SUPPORT (OUTPATIENT)
Dept: FAMILY MEDICINE | Facility: CLINIC | Age: 59
End: 2019-08-12
Payer: COMMERCIAL

## 2019-08-12 PROCEDURE — 96372 PR INJECTION,THERAP/PROPH/DIAG2ST, IM OR SUBCUT: ICD-10-PCS | Mod: S$GLB,,, | Performed by: FAMILY MEDICINE

## 2019-08-12 PROCEDURE — 96372 THER/PROPH/DIAG INJ SC/IM: CPT | Mod: S$GLB,,, | Performed by: FAMILY MEDICINE

## 2019-08-12 RX ADMIN — CYANOCOBALAMIN 1000 MCG: 1000 INJECTION, SOLUTION INTRAMUSCULAR; SUBCUTANEOUS at 08:08

## 2019-08-12 NOTE — PROGRESS NOTES
2 pt identifiers used, pt presented to clinic for B12.  Verified by Ugo Rodrigues LPN.  Site cleansed with alcohol prep pad and 2x2 gauze.   Inj administered, band-aid applied.   Pt tolerated well.

## 2019-08-19 ENCOUNTER — CLINICAL SUPPORT (OUTPATIENT)
Dept: FAMILY MEDICINE | Facility: CLINIC | Age: 59
End: 2019-08-19
Payer: COMMERCIAL

## 2019-08-19 DIAGNOSIS — E53.9 VITAMIN B DEFICIENCY: ICD-10-CM

## 2019-08-19 PROCEDURE — 96372 PR INJECTION,THERAP/PROPH/DIAG2ST, IM OR SUBCUT: ICD-10-PCS | Mod: S$GLB,,, | Performed by: FAMILY MEDICINE

## 2019-08-19 PROCEDURE — 96372 THER/PROPH/DIAG INJ SC/IM: CPT | Mod: S$GLB,,, | Performed by: FAMILY MEDICINE

## 2019-08-19 RX ADMIN — CYANOCOBALAMIN 1000 MCG: 1000 INJECTION, SOLUTION INTRAMUSCULAR; SUBCUTANEOUS at 08:08

## 2019-08-19 NOTE — PROGRESS NOTES
2 pt identifiers used, pt presented to clinic for B12 inj  Verified by Ugo Rodrigues LPN  Site cleansed with alcohol prep pad and 2x2 gauze.   Inj administered, band-aid applied.   Pt tolerated well.

## 2019-08-21 ENCOUNTER — TELEPHONE (OUTPATIENT)
Dept: NEUROLOGY | Facility: CLINIC | Age: 59
End: 2019-08-21

## 2019-08-21 ENCOUNTER — PATIENT MESSAGE (OUTPATIENT)
Dept: FAMILY MEDICINE | Facility: CLINIC | Age: 59
End: 2019-08-21

## 2019-08-21 DIAGNOSIS — M35.3 PMR (POLYMYALGIA RHEUMATICA): ICD-10-CM

## 2019-08-21 DIAGNOSIS — R29.898 WEAKNESS OF BOTH LOWER EXTREMITIES: Primary | ICD-10-CM

## 2019-08-21 DIAGNOSIS — M54.16 LUMBAR RADICULOPATHY, CHRONIC: ICD-10-CM

## 2019-08-21 NOTE — TELEPHONE ENCOUNTER
----- Message from Sandra Lugo sent at 8/21/2019  8:18 AM CDT -----  Contact: pt  Pt calling have an appointment 10/8 but was wanting to get in sooner cause her legs tingling ,please...[phone] (home)

## 2019-08-21 NOTE — TELEPHONE ENCOUNTER
Called pt to notify her that Dr. Calle does not see self referrals for appts and that we required a referral from the MD that has been following her for her concerns; notified that I would reach out in a few days to cancel if referral not received; verbalized understanding.

## 2019-08-21 NOTE — TELEPHONE ENCOUNTER
Called pt to discuss appt that was self scheduled incorrectly on Dr. Calle's schedule. Notified that Dr. Calle does not see self-referrals and that referral is required. Instructed pt to obtain referral from MD that has been following her for her concerns. Notified that appt would be left on schedule for several days unless referral not received at which point pt would be notified of cancellation. Verbalized understanding.

## 2019-08-22 ENCOUNTER — HOSPITAL ENCOUNTER (OUTPATIENT)
Dept: RADIOLOGY | Facility: HOSPITAL | Age: 59
Discharge: HOME | End: 2019-08-22
Attending: FAMILY MEDICINE
Payer: COMMERCIAL

## 2019-08-22 DIAGNOSIS — M54.16 LUMBAR RADICULOPATHY, CHRONIC: ICD-10-CM

## 2019-08-22 DIAGNOSIS — R29.898 WEAKNESS OF BOTH LOWER EXTREMITIES: ICD-10-CM

## 2019-08-22 PROCEDURE — 72100 X-RAY EXAM L-S SPINE 2/3 VWS: CPT | Mod: TC,FY,PO

## 2019-08-22 PROCEDURE — 72100 XR LUMBAR SPINE AP AND LATERAL: ICD-10-PCS | Mod: 26,,, | Performed by: RADIOLOGY

## 2019-08-22 PROCEDURE — 72100 X-RAY EXAM L-S SPINE 2/3 VWS: CPT | Mod: 26,,, | Performed by: RADIOLOGY

## 2019-08-23 ENCOUNTER — TELEPHONE (OUTPATIENT)
Dept: FAMILY MEDICINE | Facility: CLINIC | Age: 59
End: 2019-08-23

## 2019-08-23 NOTE — TELEPHONE ENCOUNTER
Yes, still recommend the MRI as there is retrolisthesis, disc space narrowing, and facet joint changes.

## 2019-08-23 NOTE — TELEPHONE ENCOUNTER
----- Message from Chris Navas sent at 8/23/2019 11:21 AM CDT -----  Contact: pt  Type: Needs Medical Advice    Who Called:  pt    Best Call Back Number: [phone]  Additional Information: Would like a CBC lab added to her lab orders for tomorrow 8/24/19. Please call to advise.

## 2019-08-24 ENCOUNTER — LAB VISIT (OUTPATIENT)
Dept: LAB | Facility: HOSPITAL | Age: 59
End: 2019-08-24
Attending: NURSE PRACTITIONER
Payer: COMMERCIAL

## 2019-08-24 DIAGNOSIS — E53.8 B12 DEFICIENCY: ICD-10-CM

## 2019-08-24 LAB
BASOPHILS # BLD AUTO: 0.05 K/UL (ref 0–0.2)
BASOPHILS NFR BLD: 0.8 % (ref 0–1.9)
DIFFERENTIAL METHOD: ABNORMAL
EOSINOPHIL # BLD AUTO: 0.2 K/UL (ref 0–0.5)
EOSINOPHIL NFR BLD: 2.7 % (ref 0–8)
ERYTHROCYTE [DISTWIDTH] IN BLOOD BY AUTOMATED COUNT: 13 % (ref 11.5–14.5)
HCT VFR BLD AUTO: 41.1 % (ref 37–48.5)
HGB BLD-MCNC: 12.8 G/DL (ref 12–16)
IMM GRANULOCYTES # BLD AUTO: 0.02 K/UL (ref 0–0.04)
IMM GRANULOCYTES NFR BLD AUTO: 0.3 % (ref 0–0.5)
LYMPHOCYTES # BLD AUTO: 2.1 K/UL (ref 1–4.8)
LYMPHOCYTES NFR BLD: 33.9 % (ref 18–48)
MCH RBC QN AUTO: 32 PG (ref 27–31)
MCHC RBC AUTO-ENTMCNC: 31.1 G/DL (ref 32–36)
MCV RBC AUTO: 103 FL (ref 82–98)
MONOCYTES # BLD AUTO: 0.6 K/UL (ref 0.3–1)
MONOCYTES NFR BLD: 9 % (ref 4–15)
NEUTROPHILS # BLD AUTO: 3.3 K/UL (ref 1.8–7.7)
NEUTROPHILS NFR BLD: 53.3 % (ref 38–73)
NRBC BLD-RTO: 0 /100 WBC
PLATELET # BLD AUTO: 331 K/UL (ref 150–350)
PMV BLD AUTO: 10.8 FL (ref 9.2–12.9)
RBC # BLD AUTO: 4 M/UL (ref 4–5.4)
VIT B12 SERPL-MCNC: 1106 PG/ML (ref 210–950)
WBC # BLD AUTO: 6.19 K/UL (ref 3.9–12.7)

## 2019-08-24 PROCEDURE — 82607 VITAMIN B-12: CPT

## 2019-08-24 PROCEDURE — 36415 COLL VENOUS BLD VENIPUNCTURE: CPT | Mod: PO

## 2019-08-24 PROCEDURE — 85025 COMPLETE CBC W/AUTO DIFF WBC: CPT

## 2019-08-26 ENCOUNTER — TELEPHONE (OUTPATIENT)
Dept: FAMILY MEDICINE | Facility: CLINIC | Age: 59
End: 2019-08-26

## 2019-08-26 ENCOUNTER — CLINICAL SUPPORT (OUTPATIENT)
Dept: FAMILY MEDICINE | Facility: CLINIC | Age: 59
End: 2019-08-26
Payer: COMMERCIAL

## 2019-08-26 DIAGNOSIS — E53.9 VITAMIN B DEFICIENCY: ICD-10-CM

## 2019-08-26 DIAGNOSIS — E53.9 VITAMIN B DEFICIENCY: Primary | ICD-10-CM

## 2019-08-26 PROCEDURE — 96372 PR INJECTION,THERAP/PROPH/DIAG2ST, IM OR SUBCUT: ICD-10-PCS | Mod: S$GLB,,, | Performed by: FAMILY MEDICINE

## 2019-08-26 PROCEDURE — 96372 THER/PROPH/DIAG INJ SC/IM: CPT | Mod: S$GLB,,, | Performed by: FAMILY MEDICINE

## 2019-08-26 RX ADMIN — CYANOCOBALAMIN 1000 MCG: 1000 INJECTION, SOLUTION INTRAMUSCULAR; SUBCUTANEOUS at 08:08

## 2019-08-26 NOTE — TELEPHONE ENCOUNTER
"Called and spoke with patient. Patient is scheduled for lab draw of B12 levels on 9/14/2019 and is scheduled for B12 injection 9/26/2019. Patient stated "Can you ask her if I can do a draw again for the CBC. I noticed a few things were off and was wondering about it." I stated "I can let Dr. Dupree know about your concerns and if she puts the orders in I can link everything together so it's one draw. I'll give you a call back and let you know what she decides." Patient stated "Ok. And if you do not reach me I may be in a meeting. You can always contact me using the patient portal as well." I voiced understanding.   "

## 2019-08-26 NOTE — TELEPHONE ENCOUNTER
Pt had B12 level drawn on Saturday . Pt is asking if it is necessary to continue B12 injections weekly . Pls review labs and advise .--lp

## 2019-08-28 ENCOUNTER — TELEPHONE (OUTPATIENT)
Dept: FAMILY MEDICINE | Facility: CLINIC | Age: 59
End: 2019-08-28

## 2019-08-28 ENCOUNTER — HOSPITAL ENCOUNTER (OUTPATIENT)
Dept: RADIOLOGY | Facility: HOSPITAL | Age: 59
Discharge: HOME | End: 2019-08-28
Attending: FAMILY MEDICINE
Payer: COMMERCIAL

## 2019-08-28 DIAGNOSIS — R29.898 WEAKNESS OF BOTH LOWER EXTREMITIES: ICD-10-CM

## 2019-08-28 DIAGNOSIS — M54.16 LUMBAR RADICULOPATHY, CHRONIC: ICD-10-CM

## 2019-08-28 DIAGNOSIS — R93.7 ABNORMAL MRI, LUMBAR SPINE: Primary | ICD-10-CM

## 2019-08-28 PROCEDURE — 72148 MRI LUMBAR SPINE WITHOUT CONTRAST: ICD-10-PCS | Mod: 26,,, | Performed by: RADIOLOGY

## 2019-08-28 PROCEDURE — 72148 MRI LUMBAR SPINE W/O DYE: CPT | Mod: 26,,, | Performed by: RADIOLOGY

## 2019-08-28 PROCEDURE — 72148 MRI LUMBAR SPINE W/O DYE: CPT | Mod: TC,PO

## 2019-08-28 NOTE — TELEPHONE ENCOUNTER
MRI with multiple disc space changes, both spinal and foraminal stenosis. Given leg sx, recommend review with back/spine clinic. Referral done.

## 2019-08-28 NOTE — TELEPHONE ENCOUNTER
Reviewed labs with patient. Patient verbalized understanding.    Spoke with patient and scheduled appointment for patient preference.

## 2019-08-29 ENCOUNTER — OFFICE VISIT (OUTPATIENT)
Dept: SPINE | Facility: CLINIC | Age: 59
End: 2019-08-29
Payer: COMMERCIAL

## 2019-08-29 VITALS
WEIGHT: 181 LBS | HEIGHT: 66 IN | HEART RATE: 75 BPM | SYSTOLIC BLOOD PRESSURE: 145 MMHG | BODY MASS INDEX: 29.09 KG/M2 | DIASTOLIC BLOOD PRESSURE: 74 MMHG

## 2019-08-29 DIAGNOSIS — M79.605 PAIN IN BOTH LOWER EXTREMITIES: ICD-10-CM

## 2019-08-29 DIAGNOSIS — R29.2 HYPERREFLEXIA: ICD-10-CM

## 2019-08-29 DIAGNOSIS — Z98.890 H/O CERVICAL SPINE SURGERY: Primary | ICD-10-CM

## 2019-08-29 DIAGNOSIS — M79.604 PAIN IN BOTH LOWER EXTREMITIES: ICD-10-CM

## 2019-08-29 PROCEDURE — 99244 OFF/OP CNSLTJ NEW/EST MOD 40: CPT | Mod: S$GLB,,, | Performed by: PHYSICIAN ASSISTANT

## 2019-08-29 PROCEDURE — 99244 PR OFFICE CONSULTATION,LEVEL IV: ICD-10-PCS | Mod: S$GLB,,, | Performed by: PHYSICIAN ASSISTANT

## 2019-08-29 PROCEDURE — 99999 PR PBB SHADOW E&M-EST. PATIENT-LVL IV: ICD-10-PCS | Mod: PBBFAC,,, | Performed by: PHYSICIAN ASSISTANT

## 2019-08-29 PROCEDURE — 99999 PR PBB SHADOW E&M-EST. PATIENT-LVL IV: CPT | Mod: PBBFAC,,, | Performed by: PHYSICIAN ASSISTANT

## 2019-08-29 NOTE — LETTER
August 30, 2019      Amie Dupree MD  [address]  [facility] [address]           [place] [facility]  [address] [facility] [address]  [facility] [address]  Phone: [phone]  Fax: [phone]          Patient: Brandie Acevedo   MR Number: [id number]   YOB: 1960   Date of Visit: 8/29/2019       Dear Dr. Amie Dupree:    Thank you for referring Brandie Acevedo to me for evaluation. Attached you will find relevant portions of my assessment and plan of care.    If you have questions, please do not hesitate to call me. I look forward to following Brandie Acevedo along with you.    Sincerely,    Katherine Khan PA-C    Enclosure  CC:  No Recipients    If you would like to receive this communication electronically, please contact [email or web address] or [phone] to request more information on [facility] access.    For providers and/or their staff who would like to refer a patient to [facility], please contact us through our one-stop-shop provider referral line, [facility], at [phone].    If you feel you have received this communication in error or would no longer like to receive these types of communications, please e-mail [email or web address]

## 2019-08-29 NOTE — PROGRESS NOTES
Back and Spine Consult    Patient ID: Brandie Acevedo is a 58 y.o. female.    Chief Complaint   Patient presents with    Numbness     She has numbness, tingling, muscle weakness, and aching in both lower extremities L>R. Sensation comes and goes and she has had it for 9 months. Pain wakes her ar night. The last 2 months her balance and gait have been off. Nothing helps. Standing for long periodsand walking alot makes pain worse.       Review of Systems   Constitutional: Negative for activity change, appetite change, chills, fever and unexpected weight change.   HENT: Negative for tinnitus, trouble swallowing and voice change.    Respiratory: Negative for apnea, cough, chest tightness and shortness of breath.    Cardiovascular: Negative for chest pain and palpitations.   Gastrointestinal: Negative for constipation, diarrhea, nausea and vomiting.   Genitourinary: Negative for difficulty urinating, dysuria, frequency and urgency.   Musculoskeletal: Positive for gait problem and myalgias. Negative for back pain, neck pain and neck stiffness.   Skin: Negative for wound.   Neurological: Positive for weakness. Negative for dizziness, tremors, seizures, facial asymmetry, speech difficulty, light-headedness, numbness and headaches.   Psychiatric/Behavioral: Negative for confusion and decreased concentration.       Past Medical History:   Diagnosis Date    Abnormal Pap smear of cervix     ascus 2013    Cataract     Dizziness     DJD (degenerative joint disease) of cervical spine     DJD (degenerative joint disease) of cervical spine     Hemorrhoids     HTN (hypertension)     Hypothyroidism     Polyarthritis rheumatica 2016    Polymyalgia      Social History     Socioeconomic History    Marital status:      Spouse name: Not on file    Number of children: 1    Years of education: Not on file    Highest education level: Not on file   Occupational History     Employer: [employer]   Social Needs     Financial resource strain: Not hard at all    Food insecurity:     Worry: Never true     Inability: Never true    Transportation needs:     Medical: No     Non-medical: No   Tobacco Use    Smoking status: Never Smoker    Smokeless tobacco: Never Used   Substance and Sexual Activity    Alcohol use: Yes     Alcohol/week: 1.0 oz     Types: 2 Standard drinks or equivalent per week     Frequency: 2-4 times a month     Drinks per session: 1 or 2     Binge frequency: Never     Comment: less frequent on mtx    Drug use: No    Sexual activity: Yes     Partners: Male     Birth control/protection: None, Post-menopausal   Lifestyle    Physical activity:     Days per week: 0 days     Minutes per session: 0 min    Stress: Only a little   Relationships    Social connections:     Talks on phone: Once a week     Gets together: Once a week     Attends Yazidi service: Not on file     Active member of club or organization: No     Attends meetings of clubs or organizations: Not on file     Relationship status:    Other Topics Concern    Not on file   Social History Narrative    Works as an , originally from .     Family History   Problem Relation Age of Onset    Heart failure Mother     Heart disease Mother     Osteoporosis Mother     Thyroid disease Mother     Heart failure Father     Heart disease Father     Hypertension Brother     Diabetes Maternal Grandfather     Breast cancer Neg Hx     Ovarian cancer Neg Hx      Review of patient's allergies indicates:   Allergen Reactions    Iodine      Other reaction(s): Rash       Current Outpatient Medications:     ascorbic acid, vitamin C, (VITAMIN C) 100 MG tablet, Take 600 mg by mouth as needed. , Disp: , Rfl:     CALCIUM-MAGNESIUM-VITAMIN D2 ORAL, Take by mouth once daily., Disp: , Rfl:     cyanocobalamin 1,000 mcg/mL injection, Inject 0.1 mLs (100 mcg total) into the muscle once daily., Disp: 10 mL, Rfl: 0    cyanocobalamin-cobamamide  "(B12) 5,000-100 mcg Fabien, , Disp: , Rfl:     diltiaZEM (CARDIZEM CD) 180 MG 24 hr capsule, Take 1 capsule (180 mg total) by mouth once daily., Disp: 90 capsule, Rfl: 1    fish oil-omega-3 fatty acids 300-1,000 mg capsule, Take by mouth as needed. , Disp: , Rfl:     folic acid (FOLVITE) 1 MG tablet, 1 TABLET BY MOUTH ONCE A DAY., Disp: , Rfl: 3    methotrexate 2.5 MG Tab, 4 TABS BY MOUTH ONCE WEEKLY ALL AT THE SAME TIME., Disp: , Rfl: 3    multivitamin (THERAGRAN) per tablet, Take 1 tablet by mouth once daily., Disp: , Rfl:     predniSONE (DELTASONE) 1 MG tablet, Take 3 mg by mouth once daily. , Disp: , Rfl:     safety needles (EASY TOUCH FLIPLOCK NEEDLE) 25 gauge x 1 1/2" Ndle, b12 injection as directed, Disp: 50 each, Rfl: 6    SYNTHROID 50 mcg tablet, TAKE 1 TABLET BY MOUTH EVERY MONDAY, TUESDAY, THURSDAY,FRIDAY,AND SUNDAY, Disp: 30 tablet, Rfl: 11    SYNTHROID 75 mcg tablet, TAKE 1 TABLET BY MOUTH TWICE A WEEK., Disp: 30 tablet, Rfl: 9    syringe, disposable, 3 mL Syrg, Daily injection, Disp: 30 each, Rfl: 6    cloNIDine (CATAPRES) 0.1 MG tablet, Take 1 tablet (0.1 mg total) by mouth 3 (three) times daily as needed (PRN SBP > 165 mmHg)., Disp: 90 tablet, Rfl: 6    Current Facility-Administered Medications:     cyanocobalamin injection 1,000 mcg, 1,000 mcg, Intramuscular, Q30 Days, Harrison Ayala NP, 1,000 mcg at 07/16/19 1611    cyanocobalamin injection 1,000 mcg, 1,000 mcg, Intramuscular, Weekly, Sam Awad MD, 1,000 mcg at 08/26/19 0842    triamcinolone acetonide injection 5 mg, 5 mg, Other, 1 time in Clinic/HOD, Amie Sands MD    Vitals:    08/29/19 1307   BP: (!) 145/74   BP Location: Left arm   Patient Position: Sitting   BP Method: Large (Automatic)   Pulse: 75   Weight: 82.1 kg (181 lb)   Height: 5' 6" (1.676 m)       Physical Exam   Constitutional: She is oriented to person, place, and time. She appears well-developed and well-nourished.   HENT:   Head: Normocephalic and " atraumatic.   Eyes: Pupils are equal, round, and reactive to light.   Neck: Normal range of motion. Neck supple.   Cardiovascular: Normal rate.   Pulmonary/Chest: Effort normal.   Musculoskeletal: Normal range of motion. She exhibits no edema.   Neurological: She is alert and oriented to person, place, and time. She has a normal Finger-Nose-Finger Test, a normal Heel to Shin Test, a normal Romberg Test and a normal Tandem Gait Test. Gait normal.   Reflex Scores:       Tricep reflexes are 2+ on the right side and 2+ on the left side.       Bicep reflexes are 2+ on the right side and 2+ on the left side.       Brachioradialis reflexes are 2+ on the right side and 2+ on the left side.       Patellar reflexes are 2+ on the right side and 2+ on the left side.       Achilles reflexes are 3+ on the right side and 2+ on the left side.  Skin: Skin is warm, dry and intact.   Psychiatric: She has a normal mood and affect. Her speech is normal and behavior is normal. Judgment and thought content normal.   Nursing note and vitals reviewed.      Neurologic Exam     Mental Status   Oriented to person, place, and time.   Oriented to person.   Oriented to place.   Oriented to time.   Follows 3 step commands.   Attention: normal. Concentration: normal.   Speech: speech is normal   Level of consciousness: alert  Knowledge: consistent with education.   Able to name object. Able to read. Able to repeat. Able to write. Normal comprehension.     Cranial Nerves     CN II   Visual acuity: normal  Right visual field deficit: none  Left visual field deficit: none     CN III, IV, VI   Pupils are equal, round, and reactive to light.  Right pupil: Size: 3 mm. Shape: regular. Reactivity: brisk. Consensual response: intact.   Left pupil: Size: 3 mm. Shape: regular. Reactivity: brisk. Consensual response: intact.   CN III: no CN III palsy  CN VI: no CN VI palsy  Nystagmus: none   Diplopia: none  Ophthalmoparesis: none  Conjugate gaze: present    CN  V   Right facial sensation deficit: none  Left facial sensation deficit: none    CN VII   Right facial weakness: none  Left facial weakness: none    CN VIII   Hearing: intact    CN IX, X   CN IX normal.   CN X normal.     CN XI   Right sternocleidomastoid strength: normal  Left sternocleidomastoid strength: normal  Right trapezius strength: normal  Left trapezius strength: normal    CN XII   Fasciculations: absent  Tongue deviation: none    Motor Exam   Muscle bulk: normal  Overall muscle tone: normal  Right arm pronator drift: absent  Left arm pronator drift: absent    Strength   Right neck flexion: 5/5  Left neck flexion: 5/5  Right neck extension: 5/5  Left neck extension: 5/5  Right deltoid: 5/5  Left deltoid: 5/5  Right biceps: 5/5  Left biceps: 5/5  Right triceps: 5/5  Left triceps: 5/5  Right wrist flexion: 5/5  Left wrist flexion: 5/5  Right wrist extension: 5/5  Left wrist extension: 5/5  Right interossei: 5/5  Left interossei: 5/5  Right abdominals: 5/5  Left abdominals: 5/5  Right iliopsoas: 5/5  Left iliopsoas: 5/5  Right quadriceps: 5/5  Left quadriceps: 5/5  Right hamstrin/5  Left hamstrin/5  Right glutei: 5/5  Left glutei: 5/5  Right anterior tibial: 5/5  Left anterior tibial: 5/5  Right posterior tibial: 5/5  Left posterior tibial: 5/5  Right peroneal: 5/5  Left peroneal: 5/5  Right gastroc: 5/5  Left gastroc: 5/5    Sensory Exam   Right arm light touch: normal  Left arm light touch: normal  Right leg light touch: normal  Left leg light touch: normal  Right arm vibration: normal  Left arm vibration: normal  Right arm pinprick: normal  Left arm pinprick: normal    Gait, Coordination, and Reflexes     Gait  Gait: normal    Coordination   Romberg: negative  Finger to nose coordination: normal  Heel to shin coordination: normal  Tandem walking coordination: normal    Tremor   Resting tremor: absent  Intention tremor: absent  Action tremor: absent    Reflexes   Right brachioradialis: 2+  Left  brachioradialis: 2+  Right biceps: 2+  Left biceps: 2+  Right triceps: 2+  Left triceps: 2+  Right patellar: 2+  Left patellar: 2+  Right achilles: 3+  Left achilles: 2+  Right : 3+  Right Lees: present  Left Lees: present  Right ankle clonus: absent  Left ankle clonus: absent      Provider dictation:  58-year-old female with polymyalgia rheumatica, hypertension and B12 deficiency which she is being treated for is referred by Dr. Dupree for evaluation of lower extremity weakness and pain.  She is status post 2011 C5-C7 ACDF by Dr. Hernandez (in [place]) for cervical cord compression per patient report.  She reports no complications from the surgery and has done relatively well.  An MRI in 2014 the cervical spine revealed normal postoperative changes with no cord compression or central canal stenosis at any level.  She has felt weakness, fatigue, general ache in the bilateral legs for 9 months.  It affects the entire left leg and the right leg from the mid thigh to the foot in a generalized distribution, but more so in the anterior leg than other areas.  She has felt gait instability and off balance for 2 months.  She denies bowel/ bladder dysfunction. She has a slight tingling in the hands.  Leg pain affects sleep quality.  She takes prednisone for polymyalgia.  Oswestry score: 14%.  PHQ:  8.    On exam, she has 3+ muscle stretch reflexes in the right patella and achilles.  There is a positive lees's bilaterally.  5/5 strength and no sensory deficits.  Gait and station are fluid.      I have reviewed an xrya of the lumbar spine from 8/22/18 revealing degenerative changes in the mid lumbar area with no change from 2014 xrays.    I have reviewed an MRI lumbar spine from 8/28/19 revealing L2/3 mild left foraminal narrowing due to a small left foraminal disk hernia.  At L3/4 and L4/5 there is a broad disk with mild right greater than left foraminal narrowing.      In conclusion, Ms. Diego has acute onset  leg pain, ache, paresthesias in a nonfocal dermatomal distribution.  She has multilevel degenerative changes in the lower back from L2/3 to L4/5 with overvall mild foraminal narrowing and I would not expect this to be the cause of her symptoms, but we will obtain an EMG/ NCV of the bilateral lower extremities to rule out radiculopathy.  It is also possible that her symptoms could be from and new cord compression with history of prior neck surgery.  She is hyperreflexic on exam, which could occur in the setting of new cord compression or could be residual from prior to surgery - we will obtain an MRI cervical spine to further assess.  Follow up in clinic with me after imaging and nerve testing.  She is scheduled to see Movement Disorder - neurology and I recommend she keep that appointment as well.  We will try to arrange all nerve testing and imaging prior to neurology appt..    Visit Diagnosis:  H/O cervical spine surgery  -     EMG W/ ULTRASOUND AND NERVE CONDUCTION TEST 2 Extremities; Future  -     MRI Cervical Spine Without Contrast; Future; Expected date: 08/29/2019    Hyperreflexia  -     EMG W/ ULTRASOUND AND NERVE CONDUCTION TEST 2 Extremities; Future  -     MRI Cervical Spine Without Contrast; Future; Expected date: 08/29/2019    Pain in both lower extremities  -     EMG W/ ULTRASOUND AND NERVE CONDUCTION TEST 2 Extremities; Future  -     MRI Cervical Spine Without Contrast; Future; Expected date: 08/29/2019        Total time spent counseling greater than fifty percent of total visit time.  Counseling included discussion regarding imaging findings, diagnosis possibilities, treatment options, risks and benefits.   The patient had many questions regarding the options and long-term effects.

## 2019-09-14 ENCOUNTER — LAB VISIT (OUTPATIENT)
Dept: LAB | Facility: HOSPITAL | Age: 59
End: 2019-09-14
Attending: FAMILY MEDICINE
Payer: COMMERCIAL

## 2019-09-14 DIAGNOSIS — E53.9 VITAMIN B DEFICIENCY: ICD-10-CM

## 2019-09-14 LAB
BASOPHILS # BLD AUTO: 0.06 K/UL (ref 0–0.2)
BASOPHILS NFR BLD: 1 % (ref 0–1.9)
DIFFERENTIAL METHOD: ABNORMAL
EOSINOPHIL # BLD AUTO: 0.2 K/UL (ref 0–0.5)
EOSINOPHIL NFR BLD: 3.2 % (ref 0–8)
ERYTHROCYTE [DISTWIDTH] IN BLOOD BY AUTOMATED COUNT: 13.2 % (ref 11.5–14.5)
HCT VFR BLD AUTO: 41 % (ref 37–48.5)
HGB BLD-MCNC: 13.1 G/DL (ref 12–16)
IMM GRANULOCYTES # BLD AUTO: 0.02 K/UL (ref 0–0.04)
IMM GRANULOCYTES NFR BLD AUTO: 0.3 % (ref 0–0.5)
LYMPHOCYTES # BLD AUTO: 1.9 K/UL (ref 1–4.8)
LYMPHOCYTES NFR BLD: 30.5 % (ref 18–48)
MCH RBC QN AUTO: 32 PG (ref 27–31)
MCHC RBC AUTO-ENTMCNC: 32 G/DL (ref 32–36)
MCV RBC AUTO: 100 FL (ref 82–98)
MONOCYTES # BLD AUTO: 0.5 K/UL (ref 0.3–1)
MONOCYTES NFR BLD: 8 % (ref 4–15)
NEUTROPHILS # BLD AUTO: 3.6 K/UL (ref 1.8–7.7)
NEUTROPHILS NFR BLD: 57 % (ref 38–73)
NRBC BLD-RTO: 0 /100 WBC
PLATELET # BLD AUTO: 296 K/UL (ref 150–350)
PMV BLD AUTO: 11.1 FL (ref 9.2–12.9)
RBC # BLD AUTO: 4.1 M/UL (ref 4–5.4)
VIT B12 SERPL-MCNC: 654 PG/ML (ref 210–950)
WBC # BLD AUTO: 6.23 K/UL (ref 3.9–12.7)

## 2019-09-14 PROCEDURE — 36415 COLL VENOUS BLD VENIPUNCTURE: CPT | Mod: PO

## 2019-09-14 PROCEDURE — 85025 COMPLETE CBC W/AUTO DIFF WBC: CPT

## 2019-09-14 PROCEDURE — 82607 VITAMIN B-12: CPT

## 2019-09-16 ENCOUNTER — HOSPITAL ENCOUNTER (OUTPATIENT)
Dept: RADIOLOGY | Facility: HOSPITAL | Age: 59
Discharge: HOME | End: 2019-09-16
Attending: PHYSICIAN ASSISTANT
Payer: COMMERCIAL

## 2019-09-16 ENCOUNTER — PATIENT MESSAGE (OUTPATIENT)
Dept: FAMILY MEDICINE | Facility: CLINIC | Age: 59
End: 2019-09-16

## 2019-09-16 ENCOUNTER — TELEPHONE (OUTPATIENT)
Dept: FAMILY MEDICINE | Facility: CLINIC | Age: 59
End: 2019-09-16

## 2019-09-16 DIAGNOSIS — R29.2 HYPERREFLEXIA: ICD-10-CM

## 2019-09-16 DIAGNOSIS — M79.605 PAIN IN BOTH LOWER EXTREMITIES: ICD-10-CM

## 2019-09-16 DIAGNOSIS — E53.8 B12 DEFICIENCY: Primary | ICD-10-CM

## 2019-09-16 DIAGNOSIS — M79.604 PAIN IN BOTH LOWER EXTREMITIES: ICD-10-CM

## 2019-09-16 DIAGNOSIS — Z98.890 H/O CERVICAL SPINE SURGERY: ICD-10-CM

## 2019-09-16 PROCEDURE — 72141 MRI NECK SPINE W/O DYE: CPT | Mod: TC,PO

## 2019-09-16 PROCEDURE — 72141 MRI CERVICAL SPINE WITHOUT CONTRAST: ICD-10-PCS | Mod: 26,,, | Performed by: RADIOLOGY

## 2019-09-16 PROCEDURE — 72141 MRI NECK SPINE W/O DYE: CPT | Mod: 26,,, | Performed by: RADIOLOGY

## 2019-09-16 NOTE — TELEPHONE ENCOUNTER
----- Message from Amie Dupree MD sent at 9/16/2019  2:33 PM CDT -----  Results via portal. Please send reminder for repeat lab in 3 months.

## 2019-09-17 ENCOUNTER — OFFICE VISIT (OUTPATIENT)
Dept: PHYSICAL MEDICINE AND REHAB | Facility: CLINIC | Age: 59
End: 2019-09-17
Payer: COMMERCIAL

## 2019-09-17 ENCOUNTER — TELEPHONE (OUTPATIENT)
Dept: FAMILY MEDICINE | Facility: CLINIC | Age: 59
End: 2019-09-17

## 2019-09-17 DIAGNOSIS — Z98.890 H/O CERVICAL SPINE SURGERY: ICD-10-CM

## 2019-09-17 DIAGNOSIS — E53.9 VITAMIN B DEFICIENCY: Primary | ICD-10-CM

## 2019-09-17 DIAGNOSIS — M79.604 PAIN IN BOTH LOWER EXTREMITIES: ICD-10-CM

## 2019-09-17 DIAGNOSIS — R29.2 HYPERREFLEXIA: ICD-10-CM

## 2019-09-17 DIAGNOSIS — M79.605 PAIN IN BOTH LOWER EXTREMITIES: ICD-10-CM

## 2019-09-17 PROCEDURE — 95909 PR NERVE CONDUCTION STUDY; 5-6 STUDIES: ICD-10-PCS | Mod: S$GLB,,, | Performed by: PHYSICAL MEDICINE & REHABILITATION

## 2019-09-17 PROCEDURE — 99499 NO LOS: ICD-10-PCS | Mod: S$GLB,,, | Performed by: PHYSICAL MEDICINE & REHABILITATION

## 2019-09-17 PROCEDURE — 99499 UNLISTED E&M SERVICE: CPT | Mod: S$GLB,,, | Performed by: PHYSICAL MEDICINE & REHABILITATION

## 2019-09-17 PROCEDURE — 95909 NRV CNDJ TST 5-6 STUDIES: CPT | Mod: S$GLB,,, | Performed by: PHYSICAL MEDICINE & REHABILITATION

## 2019-09-17 PROCEDURE — 99999 PR PBB SHADOW E&M-EST. PATIENT-LVL II: CPT | Mod: PBBFAC,,, | Performed by: PHYSICAL MEDICINE & REHABILITATION

## 2019-09-17 PROCEDURE — 99999 PR PBB SHADOW E&M-EST. PATIENT-LVL II: ICD-10-PCS | Mod: PBBFAC,,, | Performed by: PHYSICAL MEDICINE & REHABILITATION

## 2019-09-17 PROCEDURE — 95886 MUSC TEST DONE W/N TEST COMP: CPT | Mod: S$GLB,,, | Performed by: PHYSICAL MEDICINE & REHABILITATION

## 2019-09-17 PROCEDURE — 95886 PR EMG COMPLETE, W/ NERVE CONDUCTION STUDIES, 5+ MUSCLES: ICD-10-PCS | Mod: S$GLB,,, | Performed by: PHYSICAL MEDICINE & REHABILITATION

## 2019-09-17 NOTE — LETTER
September 17, 2019      Katherine Khan PA-C  [address]  [address]  [facility] [address]           [place] [facility]  [address] [place] [address]  Phone: [phone]  Fax: [phone]          Patient: Brandie Acevedo   MR Number: [id number]   YOB: 1960   Date of Visit: 9/17/2019       Dear Katherine Khan:    Thank you for referring Brandie Acevedo to me for evaluation. Attached you will find relevant portions of my assessment and plan of care.    If you have questions, please do not hesitate to call me. I look forward to following Brandie Acevedo along with you.    Sincerely,    Richard Nunez MD    Enclosure  CC:  No Recipients    If you would like to receive this communication electronically, please contact [email or web address] or [phone] to request more information on [facility] Link access.    For providers and/or their staff who would like to refer a patient to [facility], please contact us through our one-stop-shop provider referral line, [facility], at [phone].    If you feel you have received this communication in error or would no longer like to receive these types of communications, please e-mail [email or web address]

## 2019-09-17 NOTE — TELEPHONE ENCOUNTER
Pt is scheduled for labs prior to follow up in December. Pt is requesting a b12 recheck now and before next appt. Please advise if this is ok

## 2019-09-17 NOTE — PROGRESS NOTES
[facility]  [address] [place] [address]             Full Name: Brandie Acevedo Gender: Female  Patient ID: [id number] YOB: 1960  History: Pt reports tingling of left and right legs with weakness of the legs.      Visit Date: 9/17/2019 15:43  Age: 58 Years 11 Months Old  Examining Physician: Mayra  Referring Physician: Bill  Height: 5 feet 6 inch      Sensory NCS      Nerve / Sites Rec. Site Onset Lat Peak Lat NP Amp PP Amp Segments Distance Velocity     ms ms µV µV  cm m/s   R Sural - Ankle (Calf)      Calf Ankle 3.18 4.01 39.5 13.4 Calf - Ankle 14 44      2 Ankle 3.13 3.96 28.5 26.1      L Sural - Ankle (Calf)      Calf Ankle 3.44 4.17 25.3 21.1 Calf - Ankle 14 41      2 Ankle 3.39 4.17 29.1 20.2      R Superficial peroneal - Ankle      Lat leg Ankle 3.23 3.85 9.5 0.23 Lat leg - Ankle 14 43       Motor NCS      Nerve / Sites Muscle Latency Amplitude Amp % Duration Segments Distance Lat Diff Velocity     ms mV % ms  cm ms m/s   R Peroneal - EDB      Ankle EDB 5.68 1.1 100 6.82 Ankle - EDB 8        Fib head EDB 12.50 1.0 88.2 7.50 Fib head - Ankle 26 6.82 38   L Peroneal - EDB      Ankle EDB 4.95 2.3 100 7.34 Ankle - EDB 8        Fib head EDB 10.73 2.0 85.8 7.81 Fib head - Ankle 25 5.78 43   R Tibial - AH      Ankle AH 5.47 13.5 100 5.26 Ankle - AH 8        Pop fossa AH 14.43 4.8 35.3 4.90 Pop fossa - Ankle 36 8.96 40       EMG         EMG Summary Table     Spontaneous MUAP Recruitment   Muscle IA Fib PSW Fasc H.F. Amp Dur. PPP Pattern   L. Vastus medialis N None None None None N N N N   L. Tibialis anterior N None None None None N N N N   L. Peroneus longus N None None None None N N N N   L. Gastrocnemius (Medial head) N None None None None N N N N   L. First dorsal interosseous N None None None None       L. Gluteus medius N None None None None N N N N   L. Gluteus aquiles N None None None None N N N N   L. Lumbar paraspinals N None None None None       R. Vastus medialis N None  None None None N N N N   R. Tibialis anterior N None None None None N N N N   R. Peroneus longus N None None None None N N N N   R. Gastrocnemius (Medial head) N None None None None N N N N   R. First dorsal interosseous N None None None None           Summary    The motor conduction test was performed on 3 nerve(s). The results were normal in 2 nerve(s): L Peroneal - EDB, R Tibial - AH. Results outside the specified normal range were found in 1 nerve(s), as follows:   In the R Peroneal - EDB study  o the peak amplitude result was reduced for Ankle stimulation  o the take off velocity result was reduced for Fib head - Ankle segment    The sensory conduction test was normal in all 3 of the tested nerves: R Sural - Ankle (Calf), L Sural - Ankle (Calf), R Superficial peroneal - Ankle.    The needle EMG study was normal in all 13 tested muscles: L. Vastus medialis, L. Tibialis anterior, L. Peroneus longus, L. Gastrocnemius (Medial head), L. First dorsal interosseous, L. Gluteus medius, L. Gluteus aquiles, L. Lumbar paraspinals, R. Vastus medialis, R. Tibialis anterior, R. Peroneus longus, R. Gastrocnemius (Medial head), R. First dorsal interosseous.      Electrodiagnostic Impression:  1. Abnormal electrodiagnostic test findings were isolated to the right peroneal motor nerve conduction study in the form of reduced amplitude.  In the context of normal right superficial peroneal sensory, normal tibial motor nerve conduction studies, and a normal needle EMG exam of the right lower extremity, this finding may be more likely secondary to disuse atrophy of the extensor digitorum brevis muscle or musculoskeletal issues as opposed to peroneal neuropathy.  2. There was insufficient electrodiagnostic evidence for diagnoses of peripheral polyneuropathy, focal mononeuropathy, myopathy, or active axonal lumbosacral radiculopathy/plexopathy of the bilateral lower extremities.    Today's test results will be sent to her referring  providers for further review and direction in her treatment.    Thank you very much for the referral. Please call if you have any questions regarding this study or the report.       -------------------------------  Richard Nunez M.D.

## 2019-09-17 NOTE — TELEPHONE ENCOUNTER
It's too soon to repeat the b12 right now. The last level was 3 days ago. She can check it in 4-6 weeks and then again in Dec.

## 2019-09-20 ENCOUNTER — OFFICE VISIT (OUTPATIENT)
Dept: SPINE | Facility: CLINIC | Age: 59
End: 2019-09-20
Payer: COMMERCIAL

## 2019-09-20 VITALS
WEIGHT: 181 LBS | BODY MASS INDEX: 29.09 KG/M2 | HEIGHT: 66 IN | DIASTOLIC BLOOD PRESSURE: 79 MMHG | SYSTOLIC BLOOD PRESSURE: 140 MMHG | HEART RATE: 63 BPM

## 2019-09-20 DIAGNOSIS — M47.816 LUMBAR SPONDYLOSIS: ICD-10-CM

## 2019-09-20 DIAGNOSIS — Z98.890 H/O CERVICAL SPINE SURGERY: Primary | ICD-10-CM

## 2019-09-20 DIAGNOSIS — M79.605 PAIN IN BOTH LOWER EXTREMITIES: ICD-10-CM

## 2019-09-20 DIAGNOSIS — M79.604 PAIN IN BOTH LOWER EXTREMITIES: ICD-10-CM

## 2019-09-20 PROCEDURE — 99999 PR PBB SHADOW E&M-EST. PATIENT-LVL IV: CPT | Mod: PBBFAC,,, | Performed by: PHYSICIAN ASSISTANT

## 2019-09-20 PROCEDURE — 3077F SYST BP >= 140 MM HG: CPT | Mod: CPTII,S$GLB,, | Performed by: PHYSICIAN ASSISTANT

## 2019-09-20 PROCEDURE — 99214 OFFICE O/P EST MOD 30 MIN: CPT | Mod: S$GLB,,, | Performed by: PHYSICIAN ASSISTANT

## 2019-09-20 PROCEDURE — 3078F PR MOST RECENT DIASTOLIC BLOOD PRESSURE < 80 MM HG: ICD-10-PCS | Mod: CPTII,S$GLB,, | Performed by: PHYSICIAN ASSISTANT

## 2019-09-20 PROCEDURE — 99214 PR OFFICE/OUTPT VISIT, EST, LEVL IV, 30-39 MIN: ICD-10-PCS | Mod: S$GLB,,, | Performed by: PHYSICIAN ASSISTANT

## 2019-09-20 PROCEDURE — 3078F DIAST BP <80 MM HG: CPT | Mod: CPTII,S$GLB,, | Performed by: PHYSICIAN ASSISTANT

## 2019-09-20 PROCEDURE — 3008F PR BODY MASS INDEX (BMI) DOCUMENTED: ICD-10-PCS | Mod: CPTII,S$GLB,, | Performed by: PHYSICIAN ASSISTANT

## 2019-09-20 PROCEDURE — 3077F PR MOST RECENT SYSTOLIC BLOOD PRESSURE >= 140 MM HG: ICD-10-PCS | Mod: CPTII,S$GLB,, | Performed by: PHYSICIAN ASSISTANT

## 2019-09-20 PROCEDURE — 99999 PR PBB SHADOW E&M-EST. PATIENT-LVL IV: ICD-10-PCS | Mod: PBBFAC,,, | Performed by: PHYSICIAN ASSISTANT

## 2019-09-20 PROCEDURE — 3008F BODY MASS INDEX DOCD: CPT | Mod: CPTII,S$GLB,, | Performed by: PHYSICIAN ASSISTANT

## 2019-09-22 ENCOUNTER — PATIENT MESSAGE (OUTPATIENT)
Dept: FAMILY MEDICINE | Facility: CLINIC | Age: 59
End: 2019-09-22

## 2019-09-23 ENCOUNTER — OFFICE VISIT (OUTPATIENT)
Dept: FAMILY MEDICINE | Facility: CLINIC | Age: 59
End: 2019-09-23
Payer: COMMERCIAL

## 2019-09-23 VITALS
DIASTOLIC BLOOD PRESSURE: 82 MMHG | HEART RATE: 77 BPM | BODY MASS INDEX: 29.04 KG/M2 | TEMPERATURE: 98 F | OXYGEN SATURATION: 98 % | SYSTOLIC BLOOD PRESSURE: 128 MMHG | HEIGHT: 66 IN | WEIGHT: 180.69 LBS

## 2019-09-23 DIAGNOSIS — M79.10 MYALGIA: ICD-10-CM

## 2019-09-23 DIAGNOSIS — R10.12 LUQ ABDOMINAL PAIN: ICD-10-CM

## 2019-09-23 DIAGNOSIS — B34.9 VIRAL ILLNESS: Primary | ICD-10-CM

## 2019-09-23 DIAGNOSIS — R50.9 FEVER, UNSPECIFIED FEVER CAUSE: ICD-10-CM

## 2019-09-23 LAB
BILIRUB SERPL-MCNC: NORMAL MG/DL
BLOOD URINE, POC: NORMAL
COLOR, POC UA: NORMAL
GLUCOSE UR QL STRIP: NORMAL
KETONES UR QL STRIP: NORMAL
LEUKOCYTE ESTERASE URINE, POC: NORMAL
NITRITE, POC UA: NORMAL
PH, POC UA: 5
PROTEIN, POC: NORMAL
SPECIFIC GRAVITY, POC UA: 1.02
UROBILINOGEN, POC UA: NORMAL

## 2019-09-23 PROCEDURE — 81002 POCT URINE DIPSTICK WITHOUT MICROSCOPE: ICD-10-PCS | Mod: S$GLB,,, | Performed by: NURSE PRACTITIONER

## 2019-09-23 PROCEDURE — 3074F PR MOST RECENT SYSTOLIC BLOOD PRESSURE < 130 MM HG: ICD-10-PCS | Mod: CPTII,S$GLB,, | Performed by: NURSE PRACTITIONER

## 2019-09-23 PROCEDURE — 99213 PR OFFICE/OUTPT VISIT, EST, LEVL III, 20-29 MIN: ICD-10-PCS | Mod: 25,S$GLB,, | Performed by: NURSE PRACTITIONER

## 2019-09-23 PROCEDURE — 3079F DIAST BP 80-89 MM HG: CPT | Mod: CPTII,S$GLB,, | Performed by: NURSE PRACTITIONER

## 2019-09-23 PROCEDURE — 3008F BODY MASS INDEX DOCD: CPT | Mod: CPTII,S$GLB,, | Performed by: NURSE PRACTITIONER

## 2019-09-23 PROCEDURE — 99999 PR PBB SHADOW E&M-EST. PATIENT-LVL V: ICD-10-PCS | Mod: PBBFAC,,, | Performed by: NURSE PRACTITIONER

## 2019-09-23 PROCEDURE — 99213 OFFICE O/P EST LOW 20 MIN: CPT | Mod: 25,S$GLB,, | Performed by: NURSE PRACTITIONER

## 2019-09-23 PROCEDURE — 99999 PR PBB SHADOW E&M-EST. PATIENT-LVL V: CPT | Mod: PBBFAC,,, | Performed by: NURSE PRACTITIONER

## 2019-09-23 PROCEDURE — 81002 URINALYSIS NONAUTO W/O SCOPE: CPT | Mod: S$GLB,,, | Performed by: NURSE PRACTITIONER

## 2019-09-23 PROCEDURE — 3008F PR BODY MASS INDEX (BMI) DOCUMENTED: ICD-10-PCS | Mod: CPTII,S$GLB,, | Performed by: NURSE PRACTITIONER

## 2019-09-23 PROCEDURE — 87086 URINE CULTURE/COLONY COUNT: CPT

## 2019-09-23 PROCEDURE — 3074F SYST BP LT 130 MM HG: CPT | Mod: CPTII,S$GLB,, | Performed by: NURSE PRACTITIONER

## 2019-09-23 PROCEDURE — 3079F PR MOST RECENT DIASTOLIC BLOOD PRESSURE 80-89 MM HG: ICD-10-PCS | Mod: CPTII,S$GLB,, | Performed by: NURSE PRACTITIONER

## 2019-09-23 RX ORDER — SYRINGE WITH NEEDLE, 1 ML 25GX5/8"
SYRINGE, EMPTY DISPOSABLE MISCELLANEOUS
Refills: 6 | COMMUNITY
Start: 2019-07-18 | End: 2019-11-11

## 2019-09-23 RX ORDER — CYANOCOBALAMIN 1000 UG/ML
1000 INJECTION, SOLUTION INTRAMUSCULAR; SUBCUTANEOUS ONCE
Qty: 1 ML | Refills: 0 | Status: SHIPPED | OUTPATIENT
Start: 2019-09-23 | End: 2019-09-23

## 2019-09-24 NOTE — PROGRESS NOTES
This dictation has been generated using Modal Fluency Dictation some phonetic errors may occur. Please contact author for clarification if needed.     Problem List Items Addressed This Visit     None      Visit Diagnoses     Viral illness    -  Primary    Relevant Orders    POCT urine dipstick without microscope (Completed)    Urine culture    LUQ abdominal pain        Relevant Orders    POCT urine dipstick without microscope (Completed)    Urine culture    Myalgia        Fever, unspecified fever cause        Tmax 100.7    Relevant Orders    POCT urine dipstick without microscope (Completed)    Urine culture          Orders Placed This Encounter    Urine culture    POCT urine dipstick without microscope    cyanocobalamin 1,000 mcg/mL injection     Viral illness suspected due to myalgias and generalized symptoms.  Considered UTI urinalysis equivocal proceed with urine culture as above.  I will review and address accordingly.  Due for B12 injection patient may repeat B12 level prior to next injection if she feels poorly.  Call patient at 2:35 a.m. on September 24th.  She is feeling better since mid morning.  Culture is still pending.    Follow up if symptoms worsen or fail to improve.    ________________________________________________________________  ________________________________________________________________      Chief Complaint   Patient presents with    Abdominal Pain     abdominal and lower back pain      History of present illness  This 58 y.o. presents today for complaint of not feeling well.  Patient notes that Saturday her legs felt achy.  Following day and yesterday she started having low back pain and some abdominal discomfort.  She notes other muscle pains.  She had a low-grade temp 100.7°.  She took Tylenol p.m. and rested better.  This did help the fever.  She had a low-grade temp this morning 99.  Review of systems  She did have chills.  She does note some myalgias.  Patient denies rash  No  nausea vomiting diarrhea or stool changes.  BM yesterday.  Answers for HPI/ROS submitted by the patient on 2019   Abdominal pain  Chronicity: new  Onset: yesterday  Onset quality: undetermined  Episode duration: 2 days  Progression since onset: rapidly worsening  Pain location: LUQ, RUQ, epigastric region  Pain quality: aching, cramping, a sensation of fullness  constipation: Yes  fever: Yes  flatus: Yes  frequency: Yes  myalgias: Yes  Aggravated by: nothing  Relieved by: nothing  Pain severity: severe  Treatments tried: acetaminophen  Improvement on treatment: mild  abdominal surgery: No  colon cancer: No  Crohn's disease: No  gallstones: No  GERD: No  irritable bowel syndrome: No  kidney stones: No  pancreatitis: No  PUD: No  ulcerative colitis: No  UTI: No    Past medical and social history reviewed.    Past Medical History:   Diagnosis Date    Abnormal Pap smear of cervix     ascus     Cataract     Dizziness     DJD (degenerative joint disease) of cervical spine     DJD (degenerative joint disease) of cervical spine     Hemorrhoids     HTN (hypertension)     Hypothyroidism     Polyarthritis rheumatica 2016    Polymyalgia        Past Surgical History:   Procedure Laterality Date    BIOPSY OF TEMPORAL ARTERY Right 2018    Procedure: BIOPSY, ARTERY, TEMPORAL;  Surgeon: Jamie Hoang MD;  Location: [facility];  Service: General;  Laterality: Right;    cervical fusion       SECTION      COLONOSCOPY      COSMETIC SURGERY      rhinoplasty    DEXA      ,     KNEE ARTHROSCOPY W/ ACL RECONSTRUCTION Left     RHINOPLASTY TIP      WISDOM TOOTH EXTRACTION         Family History   Problem Relation Age of Onset    Heart failure Mother     Heart disease Mother     Osteoporosis Mother     Thyroid disease Mother     Heart failure Father     Heart disease Father     Hypertension Brother     Diabetes Maternal Grandfather     Breast cancer Neg Hx     Ovarian cancer  Neg Hx        Social History     Socioeconomic History    Marital status:      Spouse name: Not on file    Number of children: 1    Years of education: Not on file    Highest education level: Not on file   Occupational History     Employer: [employer]   Social Needs    Financial resource strain: Not hard at all    Food insecurity:     Worry: Never true     Inability: Never true    Transportation needs:     Medical: No     Non-medical: No   Tobacco Use    Smoking status: Never Smoker    Smokeless tobacco: Never Used   Substance and Sexual Activity    Alcohol use: Yes     Alcohol/week: 1.7 standard drinks     Types: 2 Standard drinks or equivalent per week     Frequency: 2-4 times a month     Drinks per session: 1 or 2     Binge frequency: Never     Comment: less frequent on mtx    Drug use: No    Sexual activity: Yes     Partners: Male     Birth control/protection: None, Post-menopausal   Lifestyle    Physical activity:     Days per week: 0 days     Minutes per session: 0 min    Stress: Only a little   Relationships    Social connections:     Talks on phone: Once a week     Gets together: Once a week     Attends Christian service: Not on file     Active member of club or organization: No     Attends meetings of clubs or organizations: Not on file     Relationship status:    Other Topics Concern    Not on file   Social History Narrative    Works as an , originally from .       Current Outpatient Medications   Medication Sig Dispense Refill    CALCIUM-MAGNESIUM-VITAMIN D2 ORAL Take by mouth once daily.      diltiaZEM (CARDIZEM CD) 180 MG 24 hr capsule Take 1 capsule (180 mg total) by mouth once daily. 90 capsule 1    folic acid (FOLVITE) 1 MG tablet 1 TABLET BY MOUTH ONCE A DAY.  3    methotrexate 2.5 MG Tab 4 TABS BY MOUTH ONCE WEEKLY ALL AT THE SAME TIME.  3    multivitamin (THERAGRAN) per tablet Take 1 tablet by mouth once daily.      predniSONE  "(DELTASONE) 1 MG tablet Take 2 mg by mouth once daily.       SYNTHROID 50 mcg tablet TAKE 1 TABLET BY MOUTH EVERY MONDAY, TUESDAY, THURSDAY,FRIDAY,AND SUNDAY 30 tablet 11    SYNTHROID 75 mcg tablet TAKE 1 TABLET BY MOUTH TWICE A WEEK. 30 tablet 9    ascorbic acid, vitamin C, (VITAMIN C) 100 MG tablet Take 600 mg by mouth as needed.       BD LUER-PATTI SYRINGE 3 mL 25 x 1 1/2 " Syrg U FOR B12 INJECTIONS UTD  6    cloNIDine (CATAPRES) 0.1 MG tablet Take 1 tablet (0.1 mg total) by mouth 3 (three) times daily as needed (PRN SBP > 165 mmHg). (Patient not taking: Reported on 9/23/2019) 90 tablet 6    cyanocobalamin 1,000 mcg/mL injection Inject 0.1 mLs (100 mcg total) into the muscle once daily. 10 mL 0    cyanocobalamin-cobamamide (B12) 5,000-100 mcg Lozg       fish oil-omega-3 fatty acids 300-1,000 mg capsule Take by mouth as needed.       safety needles (EASY TOUCH FLIPLOCK NEEDLE) 25 gauge x 1 1/2" Ndle b12 injection as directed 50 each 6    syringe, disposable, 3 mL Syrg Daily injection 30 each 6     Current Facility-Administered Medications   Medication Dose Route Frequency Provider Last Rate Last Dose    cyanocobalamin injection 1,000 mcg  1,000 mcg Intramuscular Q30 Days Harrison Ayala NP   1,000 mcg at 07/16/19 1611    triamcinolone acetonide injection 5 mg  5 mg Other 1 time in Clinic/HOD Amie Sands MD           Review of patient's allergies indicates:   Allergen Reactions    Iodine      Other reaction(s): Rash       Physical examination  Vitals Reviewed  Gen. Well-dressed well-nourished   Skin warm dry and intact.  No rashes noted.  HEENT.  TM intact bilateral with normal light reflex.  No mastoid tenderness during percussion.  Nares patent bilateral.  Pharynx is unremarkable.  No maxillary or frontal sinus tenderness when percussed.    Neck is supple without adenopathy  Chest.  Respirations are even unlabored.  Lungs are clear to auscultation.  Cardiac regular rate and rhythm.  No chest " wall adenopathy noted.  Abdomen is soft and not distended.  Bowel sounds are present.  No tenderness during palpation of the abdomen.  No Hepatosplenomegaly noted.  No hernia noted.  No CVA tenderness to percussion.    Neuro. Awake alert oriented x4.  Normal judgment and cognition noted.  Extremities no clubbing cyanosis or edema noted.     Call or return to clinic prn if these symptoms worsen or fail to improve as anticipated.

## 2019-09-25 LAB — BACTERIA UR CULT: NO GROWTH

## 2019-09-26 ENCOUNTER — CLINICAL SUPPORT (OUTPATIENT)
Dept: FAMILY MEDICINE | Facility: CLINIC | Age: 59
End: 2019-09-26
Payer: COMMERCIAL

## 2019-09-26 DIAGNOSIS — E53.9 VITAMIN B DEFICIENCY: ICD-10-CM

## 2019-09-26 PROCEDURE — 96372 THER/PROPH/DIAG INJ SC/IM: CPT | Mod: S$GLB,,, | Performed by: FAMILY MEDICINE

## 2019-09-26 PROCEDURE — 96372 PR INJECTION,THERAP/PROPH/DIAG2ST, IM OR SUBCUT: ICD-10-PCS | Mod: S$GLB,,, | Performed by: FAMILY MEDICINE

## 2019-09-26 RX ADMIN — CYANOCOBALAMIN 1000 MCG: 1000 INJECTION, SOLUTION INTRAMUSCULAR; SUBCUTANEOUS at 08:09

## 2019-09-26 NOTE — PROGRESS NOTES
Back and Spine Consult    Patient ID: Brandie Acevedo is a 58 y.o. female.    Chief Complaint   Patient presents with    Follow-up     MRI and EMG results       Review of Systems   Constitutional: Negative for activity change, appetite change, chills, fever and unexpected weight change.   HENT: Negative for tinnitus, trouble swallowing and voice change.    Respiratory: Negative for apnea, cough, chest tightness and shortness of breath.    Cardiovascular: Negative for chest pain and palpitations.   Gastrointestinal: Negative for constipation, diarrhea, nausea and vomiting.   Genitourinary: Negative for difficulty urinating, dysuria, frequency and urgency.   Musculoskeletal: Positive for gait problem and myalgias. Negative for back pain, neck pain and neck stiffness.   Skin: Negative for wound.   Neurological: Positive for weakness. Negative for dizziness, tremors, seizures, facial asymmetry, speech difficulty, light-headedness, numbness and headaches.   Psychiatric/Behavioral: Negative for confusion and decreased concentration.       Past Medical History:   Diagnosis Date    Abnormal Pap smear of cervix     ascus 2013    Cataract     Dizziness     DJD (degenerative joint disease) of cervical spine     DJD (degenerative joint disease) of cervical spine     Hemorrhoids     HTN (hypertension)     Hypothyroidism     Polyarthritis rheumatica 2016    Polymyalgia      Social History     Socioeconomic History    Marital status:      Spouse name: Not on file    Number of children: 1    Years of education: Not on file    Highest education level: Not on file   Occupational History     Employer: [employer]   Social Needs    Financial resource strain: Not hard at all    Food insecurity:     Worry: Never true     Inability: Never true    Transportation needs:     Medical: No     Non-medical: No   Tobacco Use    Smoking status: Never Smoker    Smokeless tobacco: Never Used   Substance and  Sexual Activity    Alcohol use: Yes     Alcohol/week: 1.7 standard drinks     Types: 2 Standard drinks or equivalent per week     Frequency: 2-4 times a month     Drinks per session: 1 or 2     Binge frequency: Never     Comment: less frequent on mtx    Drug use: No    Sexual activity: Yes     Partners: Male     Birth control/protection: None, Post-menopausal   Lifestyle    Physical activity:     Days per week: 0 days     Minutes per session: 0 min    Stress: Only a little   Relationships    Social connections:     Talks on phone: Once a week     Gets together: Once a week     Attends Anabaptism service: Not on file     Active member of club or organization: No     Attends meetings of clubs or organizations: Not on file     Relationship status:    Other Topics Concern    Not on file   Social History Narrative    Works as an , originally from [place].     Family History   Problem Relation Age of Onset    Heart failure Mother     Heart disease Mother     Osteoporosis Mother     Thyroid disease Mother     Heart failure Father     Heart disease Father     Hypertension Brother     Diabetes Maternal Grandfather     Breast cancer Neg Hx     Ovarian cancer Neg Hx      Review of patient's allergies indicates:   Allergen Reactions    Iodine      Other reaction(s): Rash       Current Outpatient Medications:     ascorbic acid, vitamin C, (VITAMIN C) 100 MG tablet, Take 600 mg by mouth as needed. , Disp: , Rfl:     CALCIUM-MAGNESIUM-VITAMIN D2 ORAL, Take by mouth once daily., Disp: , Rfl:     cyanocobalamin 1,000 mcg/mL injection, Inject 0.1 mLs (100 mcg total) into the muscle once daily., Disp: 10 mL, Rfl: 0    cyanocobalamin-cobamamide (B12) 5,000-100 mcg Lozg, , Disp: , Rfl:     diltiaZEM (CARDIZEM CD) 180 MG 24 hr capsule, Take 1 capsule (180 mg total) by mouth once daily., Disp: 90 capsule, Rfl: 1    fish oil-omega-3 fatty acids 300-1,000 mg capsule, Take by mouth as needed. , Disp: , Rfl:  "    folic acid (FOLVITE) 1 MG tablet, 1 TABLET BY MOUTH ONCE A DAY., Disp: , Rfl: 3    methotrexate 2.5 MG Tab, 4 TABS BY MOUTH ONCE WEEKLY ALL AT THE SAME TIME., Disp: , Rfl: 3    multivitamin (THERAGRAN) per tablet, Take 1 tablet by mouth once daily., Disp: , Rfl:     predniSONE (DELTASONE) 1 MG tablet, Take 2 mg by mouth once daily. , Disp: , Rfl:     safety needles (EASY TOUCH FLIPLOCK NEEDLE) 25 gauge x 1 1/2" Ndle, b12 injection as directed, Disp: 50 each, Rfl: 6    SYNTHROID 50 mcg tablet, TAKE 1 TABLET BY MOUTH EVERY MONDAY, TUESDAY, THURSDAY,FRIDAY,AND SUNDAY, Disp: 30 tablet, Rfl: 11    SYNTHROID 75 mcg tablet, TAKE 1 TABLET BY MOUTH TWICE A WEEK., Disp: 30 tablet, Rfl: 9    syringe, disposable, 3 mL Syrg, Daily injection, Disp: 30 each, Rfl: 6    BD LUER-PATTI SYRINGE 3 mL 25 x 1 1/2 " Syrg, U FOR B12 INJECTIONS UTD, Disp: , Rfl: 6    cloNIDine (CATAPRES) 0.1 MG tablet, Take 1 tablet (0.1 mg total) by mouth 3 (three) times daily as needed (PRN SBP > 165 mmHg). (Patient not taking: Reported on 9/23/2019), Disp: 90 tablet, Rfl: 6    Current Facility-Administered Medications:     cyanocobalamin injection 1,000 mcg, 1,000 mcg, Intramuscular, Q30 Days, Harrison Ayala NP, 1,000 mcg at 09/26/19 0814    triamcinolone acetonide injection 5 mg, 5 mg, Other, 1 time in Clinic/HOD, Amie Sands MD    Vitals:    09/20/19 1259   BP: (!) 140/79   BP Location: Left arm   Patient Position: Sitting   BP Method: Large (Automatic)   Pulse: 63   Weight: 82.1 kg (181 lb)   Height: 5' 6" (1.676 m)       Physical Exam   Constitutional: She is oriented to person, place, and time. She appears well-developed and well-nourished.   HENT:   Head: Normocephalic and atraumatic.   Eyes: Pupils are equal, round, and reactive to light.   Neck: Normal range of motion. Neck supple.   Cardiovascular: Normal rate.   Pulmonary/Chest: Effort normal.   Musculoskeletal: Normal range of motion. She exhibits no edema. "   Neurological: She is alert and oriented to person, place, and time. She has a normal Finger-Nose-Finger Test, a normal Heel to Shin Test, a normal Romberg Test and a normal Tandem Gait Test. Gait normal.   Reflex Scores:       Tricep reflexes are 2+ on the right side and 2+ on the left side.       Bicep reflexes are 2+ on the right side and 2+ on the left side.       Brachioradialis reflexes are 2+ on the right side and 2+ on the left side.       Patellar reflexes are 2+ on the right side and 2+ on the left side.       Achilles reflexes are 3+ on the right side and 2+ on the left side.  Skin: Skin is warm, dry and intact.   Psychiatric: She has a normal mood and affect. Her speech is normal and behavior is normal. Judgment and thought content normal.   Nursing note and vitals reviewed.      Neurologic Exam     Mental Status   Oriented to person, place, and time.   Oriented to person.   Oriented to place.   Oriented to time.   Follows 3 step commands.   Attention: normal. Concentration: normal.   Speech: speech is normal   Level of consciousness: alert  Knowledge: consistent with education.   Able to name object. Able to read. Able to repeat. Able to write. Normal comprehension.     Cranial Nerves     CN II   Visual acuity: normal  Right visual field deficit: none  Left visual field deficit: none     CN III, IV, VI   Pupils are equal, round, and reactive to light.  Right pupil: Size: 3 mm. Shape: regular. Reactivity: brisk. Consensual response: intact.   Left pupil: Size: 3 mm. Shape: regular. Reactivity: brisk. Consensual response: intact.   CN III: no CN III palsy  CN VI: no CN VI palsy  Nystagmus: none   Diplopia: none  Ophthalmoparesis: none  Conjugate gaze: present    CN V   Right facial sensation deficit: none  Left facial sensation deficit: none    CN VII   Right facial weakness: none  Left facial weakness: none    CN VIII   Hearing: intact    CN IX, X   CN IX normal.   CN X normal.     CN XI   Right  sternocleidomastoid strength: normal  Left sternocleidomastoid strength: normal  Right trapezius strength: normal  Left trapezius strength: normal    CN XII   Fasciculations: absent  Tongue deviation: none    Motor Exam   Muscle bulk: normal  Overall muscle tone: normal  Right arm pronator drift: absent  Left arm pronator drift: absent    Strength   Right neck flexion: 5/5  Left neck flexion: 5/5  Right neck extension: 5/5  Left neck extension: 5/5  Right deltoid: 5/5  Left deltoid: 5/5  Right biceps: 5/5  Left biceps: 5/5  Right triceps: 5/5  Left triceps: 5/5  Right wrist flexion: 5/5  Left wrist flexion: 5/5  Right wrist extension: 5/5  Left wrist extension: 5/5  Right interossei: 5/5  Left interossei: 5/5  Right abdominals: 5/5  Left abdominals: 5/5  Right iliopsoas: 5/5  Left iliopsoas: 5/5  Right quadriceps: 5/5  Left quadriceps: 5/5  Right hamstrin/5  Left hamstrin/5  Right glutei: 5/5  Left glutei: 5/5  Right anterior tibial: 5/5  Left anterior tibial: 5/5  Right posterior tibial: 5/5  Left posterior tibial: 5/5  Right peroneal: 5/5  Left peroneal: 5/5  Right gastroc: 5/5  Left gastroc: 5/5    Sensory Exam   Right arm light touch: normal  Left arm light touch: normal  Right leg light touch: normal  Left leg light touch: normal  Right arm vibration: normal  Left arm vibration: normal  Right arm pinprick: normal  Left arm pinprick: normal    Gait, Coordination, and Reflexes     Gait  Gait: normal    Coordination   Romberg: negative  Finger to nose coordination: normal  Heel to shin coordination: normal  Tandem walking coordination: normal    Tremor   Resting tremor: absent  Intention tremor: absent  Action tremor: absent    Reflexes   Right brachioradialis: 2+  Left brachioradialis: 2+  Right biceps: 2+  Left biceps: 2+  Right triceps: 2+  Left triceps: 2+  Right patellar: 2+  Left patellar: 2+  Right achilles: 3+  Left achilles: 2+  Right : 3+  Right Lees: present  Left Lees: present  Right  ankle clonus: absent  Left ankle clonus: absent      Provider dictation:  58-year-old female with polymyalgia rheumatica, hypertension and B12 deficiency (which she is being treated for) presents for follow up of lower extremity weakness and pain after undergoing nerve testing.  She is status post 2011 C5-C7 ACDF by Dr. Hernandez (in [place]) for cervical cord compression per patient report.  She reports no complications from the surgery and has done relatively well.  An MRI in 2014 the cervical spine revealed normal postoperative changes with no cord compression or central canal stenosis at any level.  Hyper-reflexia was detected on exam at last visit and MRI cervical spine was ordered to rule out any any cord compression.  She has felt weakness, fatigue, general ache in the bilateral legs for 9 months.  It affects the entire left leg and the right leg from the mid thigh to the foot in a generalized distribution, but more so in the anterior leg than other areas.  She has felt gait instability and off balance for 2 months.  She denies bowel/ bladder dysfunction. She has a slight tingling in the hands.  Leg pain affects sleep quality.  She takes prednisone for polymyalgia. Since last visit she has been feeling better with tingling not as pronounced and legs not as sore.  Oswestry score: 14%.  PHQ:  8.    On exam, she has 3+ muscle stretch reflexes in the right patella and achilles.  There is a positive nathan's bilaterally.  5/5 strength and no sensory deficits.  Gait and station are fluid.      I have reviewed MRI cervical spine revealing postoperative changes of C5-C7 ACDF.  At C4-5 there is a central disc osteophyte complex with some anterior thecal sac impingement.  There is no significant central canal stenosis or cord signal change.  Bilateral foraminal narrowing is present at this level.    I have reviewed an xray of the lumbar spine from 8/22/18 revealing degenerative changes in the mid lumbar area with  no change from 2014 xrays.    I have reviewed an MRI lumbar spine from 8/28/19 revealing L2/3 mild left foraminal narrowing due to a small left foraminal disk hernia.  At L3/4 and L4/5 there is a broad disk with mild right greater than left foraminal narrowing.      EMG nerve conduction test from 09/17/2019 in the bilateral lower extremities reveals no focal neuropathy or radiculopathy.  There is a possible muscular issue detected on this nerve test, but overall inconclusive.    In conclusion, Ms. Diego has acute onset leg pain, ache, paresthesias in a nonfocal dermatomal distribution.  She has multilevel degenerative changes in the lower back from L2/3 to L4/5 with overvall mild foraminal narrowing and I would not expect this to be the cause of her symptoms.  Nerve testing did not reveal a focal neuropathy or radiculopathy either to explain her lower extremity symptoms.  She should keep her appointment with neurology for further assessment as no structural cause is seen at this time.  Hyper-reflexia is resisual for cord compression present to cervical spine surgery as there is no new cord comrpession or signal changes.  She has asymptomatic C4/5 spondylosis/ adjacent level disease.  ,Follow up with me as needed.    Visit Diagnosis:  H/O cervical spine surgery    Pain in both lower extremities    Lumbar spondylosis        Total time spent counseling greater than fifty percent of total visit time.  Counseling included discussion regarding imaging findings, diagnosis possibilities, treatment options, risks and benefits.   The patient had many questions regarding the options and long-term effects.

## 2019-10-04 ENCOUNTER — PATIENT OUTREACH (OUTPATIENT)
Dept: OTHER | Facility: OTHER | Age: 59
End: 2019-10-04

## 2019-10-09 NOTE — PROGRESS NOTES
"[facility]: Health  Follow-Up    Patient reports she just returned from [place]. Patient reports she has a great vacation but is a little tired. Patient states, "we did a lot of walking". Patient reports overall the B12 injections are helping.     The history is provided by the patient. No  was used.     Follow Up  Follow-up reason(s): reading review      Readings are missing.   patient reminder needed.  Routine Education Topics: eating patterns and physical activity          Diet:       Patient reports no changes. Patient did not want to focus on dietary habits at this time.     Assigning the following patient goals: maintain low sodium diet    Physical Activity:       Patient reports she plans to start walking in the morning with the cooler weather approaching.     Assigning the following patient goal(s): increase physical activity and participate in exercise weekly      INTERVENTION(S)  recommend physical activity and encouragement/support    PLAN  patient verbalizes understanding and patient amenable to changes      There are no preventive care reminders to display for this patient.    Last 5 Patient Entered Readings                                      Current 30 Day Average: 118/77     Recent Readings 10/1/2019 10/1/2019 9/30/2019 9/28/2019 9/26/2019    SBP (mmHg) 121 149 113 117 123    DBP (mmHg) 78 81 76 62 76    Pulse 67 79 67 67 77                "

## 2019-10-21 ENCOUNTER — OFFICE VISIT (OUTPATIENT)
Dept: NEUROLOGY | Facility: CLINIC | Age: 59
End: 2019-10-21
Payer: COMMERCIAL

## 2019-10-21 VITALS
DIASTOLIC BLOOD PRESSURE: 74 MMHG | BODY MASS INDEX: 29.02 KG/M2 | WEIGHT: 180.56 LBS | HEIGHT: 66 IN | RESPIRATION RATE: 20 BRPM | HEART RATE: 77 BPM | SYSTOLIC BLOOD PRESSURE: 188 MMHG

## 2019-10-21 DIAGNOSIS — G89.29 CHRONIC PAIN OF LEFT KNEE: Primary | ICD-10-CM

## 2019-10-21 DIAGNOSIS — M25.562 CHRONIC PAIN OF LEFT KNEE: Primary | ICD-10-CM

## 2019-10-21 DIAGNOSIS — R26.9 GAIT DISTURBANCE: ICD-10-CM

## 2019-10-21 DIAGNOSIS — R29.898 COMPLAINTS OF LEG WEAKNESS: ICD-10-CM

## 2019-10-21 PROCEDURE — 99244 OFF/OP CNSLTJ NEW/EST MOD 40: CPT | Mod: S$GLB,,, | Performed by: PSYCHIATRY & NEUROLOGY

## 2019-10-21 PROCEDURE — 99244 PR OFFICE CONSULTATION,LEVEL IV: ICD-10-PCS | Mod: S$GLB,,, | Performed by: PSYCHIATRY & NEUROLOGY

## 2019-10-21 PROCEDURE — 99999 PR PBB SHADOW E&M-EST. PATIENT-LVL III: CPT | Mod: PBBFAC,,, | Performed by: PSYCHIATRY & NEUROLOGY

## 2019-10-21 PROCEDURE — 99999 PR PBB SHADOW E&M-EST. PATIENT-LVL III: ICD-10-PCS | Mod: PBBFAC,,, | Performed by: PSYCHIATRY & NEUROLOGY

## 2019-10-21 NOTE — LETTER
October 21, 2019      Amie Dupree MD  [address]  [place] [address]           [facility]  [address] [place] [address]  Phone: [phone]  Fax: [phone]          Patient: Brandie Acevedo   MR Number: [id number]   YOB: 1960   Date of Visit: 10/21/2019       Dear Dr. Amie Dupree:    Thank you for referring Brandie Acevedo to me for evaluation. Attached you will find relevant portions of my assessment and plan of care.    If you have questions, please do not hesitate to call me. I look forward to following Brandie Acevedo along with you.    Sincerely,    Camille Calle, DO    Enclosure  CC:  No Recipients    If you would like to receive this communication electronically, please contact [email or web address] or [phone] to request more information on [email or web address] Link access.    For providers and/or their staff who would like to refer a patient to [facility], please contact us through our one-stop-shop provider referral line, [facility], at [phone].    If you feel you have received this communication in error or would no longer like to receive these types of communications, please e-mail [email or web address]

## 2019-10-21 NOTE — PROGRESS NOTES
"NEUROLOGY  Outpatient CONSULT    [facility]  [address] [facility] [address], [place] [address]  [phone] (office) / [phone] (fax)    Patient Name:  Brandie Acevedo  :  1960  MR #:  [id number]  Acct #:  [id number]    Date of Neurology Consult: 10/21/2019  Name of Neurologist: Camille Calle D.O, ABPN, AOBNP, ABEM    Other Physicians:  Amie Dupree MD (Primary Care Physician); Amie Dupree MD (Referring)      Chief Complaint: muscle weakness (BLE) and Numbness (BLE)      History of Present Illness (HPI):  Brandie Acevedo is a 59 y.o. female referred by her PCP for consultation regarding weakness of the lower extremities.    Patient states that she has been having a lot of issues going on.    She was diagnosed with polymyalgia rheumatica about 3 years ago.  She is on methotrexate and prednisone as prescribed by Dr. [facility] [place].  She has reduced her dosage recently and is on fairly low dose prednisone currently.  She will remain on methotrexate for a while.    Two years ago she tore her ACL.  She feels her knee has not been good since then.  The surgeon feels the ACL is healing, but she feels the muscles have not come back.  She is also numb on the skin on the medial knee.      At the end of last year, her left leg was hurting around the knee.  She went to PT and had dry needling done.  She had about 16 therapy sessions.  She feels this helped "quite a bit".  About 4-6 weeks after completion, she started getting pain at night.  She also started to get numbness and tingling in her legs and feet bilaterally.  She then noted it in her hands and arms.  This was in the spring of this year.  She had a B12 study done and it showed her B12 was below range.  Her platelets were also high.  She was started on B12 therapy, she initially took tablets which did not help her leg pain and fatigue.  She switched to injections and her legs started to feel " "better.  After 2-3 months, she felt the numbness and tingling was resolving and the right leg pain was subsiding.  The left leg did not improve.    Night time is most bothersome.  If she is sleeping on her left side the left leg is bothered.  Rolling on her right side relieves the pain.      Last year before her leg symptoms began, she was having some vision issues and headaches.  The vision was described as difficulty focusing.  She didn't have double vision, but objects could appear offset.  She also complained of loss of sense of smell.  She was put on high dose prednisone. She had an artery biopsy which came back as negative.  The symptoms weren't improving so was sent to ophthalmology who said she was fine.  ENT did an MRI brain and said everything was fine.  The symptoms continued through the fall of last year when the leg symptoms began.  The vision and headaches resolved.  She had been seen by neurology who put her on migraine medications.  She did not take them because she did not want to take another medication without a "real explanation".  She has since had an MRI of her neck and lumbar spine which were reportedly fine.  She [facility] and was cleared.      She feels like she is walking like she is unbalanced.  She is not sure if this is the nagging left knee or something else.  She does not feel dizzy.    The left knee pain feels like a tightness around the knee, then at night she had a feeling of a hotness.  This is not a burning feeling.  She has had pins and needles, but this is no longer present.      Treatment to date:    B12    Review of Systems:   General: Weight gain: No, Weight Loss: No, Fatigue: Yes,   Fever: No, Chills: No, Night Sweats: Yes, Insomnia: Yes, Excessive sleeping: No   Respiratory:  Cough: Yes, Shortness of Breath: Yes,   Wheezing: No, Excessive Snoring: Yes, Coughing up blood: No  Endocrine: Heat Intolerance: Yes, Cold Intolerance: No,   Excessive " Thirst: No, Excessive Hunger: No,   Eyes:  Blurred Vision: Yes, Double Vision: Yes,   Light Sensitivity: Yes, Eye pain: No  Musculoskeletal: Muscle Aches/Pain: Yes, Joint Pain/Swelling: Yes, Muscle Cramps: Yes, Muscle Weakness: Yes, Neck Pain: No, Back Pain: No   Neurological: Difficulty Walking/Falls: Yes, Headache Migraine: No, Dizziness/Vertigo: No, Fainting: No, Difficulty with Speech: No, Weakness: Yes, Tingling/Numbness: Yes, Tremors: No, Memory Problems: Yes, Seizures: No, Difficulty Swallowing: No, Altered Taste: No.  Cardiovascular: Chest Pain: No, Shortness of Breath: Yes,   Palpitations: No,  Gastrointestinal: Nausea/Vomiting: No, Constipation: No, Diarrhea: No, Bloody Stools: No   Psych/Cog:  Depression: No, Anxiety: Yes, Hallucinations: No, Problems Concentrating: Yes  : Frequent Urination: No, Incontinence: No, Blood of Urine: No, Urinary Infections: No, Changes in Sex Drive: No   ENT:Hearing Loss: Yes, Earache: No, Ringing in Ears: Yes,   Facial Pain: No, Chronic Congestion: No   Immune: Seasonal Allergies: Yes, Hives and/or Rashes: No  The remainder of the review of twelve body systems was reviewed and normal.    Past Medical, Surgical, Family & Social History:   Past Medical History:   Diagnosis Date    Abnormal Pap smear of cervix     ascus     Cataract     Dizziness     DJD (degenerative joint disease) of cervical spine     DJD (degenerative joint disease) of cervical spine     Hemorrhoids     HTN (hypertension)     Hypothyroidism     Polyarthritis rheumatica 2016    Polymyalgia      Past Surgical History:   Procedure Laterality Date    BIOPSY OF TEMPORAL ARTERY Right 2018    Procedure: BIOPSY, ARTERY, TEMPORAL;  Surgeon: Jamie Hoang MD;  Location[facility];  Service: General;  Laterality: Right;    cervical fusion       SECTION      COLONOSCOPY      COSMETIC SURGERY      rhinoplasty    DEXA      ,     KNEE ARTHROSCOPY W/ ACL RECONSTRUCTION  "Left     RHINOPLASTY TIP      WISDOM TOOTH EXTRACTION       Family History   Problem Relation Age of Onset    Heart failure Mother     Heart disease Mother     Osteoporosis Mother     Thyroid disease Mother     Heart failure Father     Heart disease Father     Hypertension Brother     Diabetes Maternal Grandfather     Breast cancer Neg Hx     Ovarian cancer Neg Hx      Alcohol use:  reports that she drinks about 1.7 standard drinks of alcohol per week.   (Of note, 0.6 oz = 1 beer or 6 oz = 10 beers).  Tobacco use:  reports that she has never smoked. She has never used smokeless tobacco.  Street drug use:  reports that she does not use drugs.  Allergies: Iodine.    Home Medications:     Current Outpatient Medications:     CALCIUM-MAGNESIUM-VITAMIN D2 ORAL, Take by mouth once daily., Disp: , Rfl:     diltiaZEM (CARDIZEM CD) 180 MG 24 hr capsule, Take 1 capsule (180 mg total) by mouth once daily., Disp: 90 capsule, Rfl: 1    folic acid (FOLVITE) 1 MG tablet, 1 TABLET BY MOUTH ONCE A DAY., Disp: , Rfl: 3    methotrexate 2.5 MG Tab, 4 TABS BY MOUTH ONCE WEEKLY ALL AT THE SAME TIME., Disp: , Rfl: 3    multivitamin (THERAGRAN) per tablet, Take 1 tablet by mouth once daily., Disp: , Rfl:     predniSONE (DELTASONE) 1 MG tablet, Take 2 mg by mouth once daily. , Disp: , Rfl:     SYNTHROID 50 mcg tablet, TAKE 1 TABLET BY MOUTH EVERY MONDAY, TUESDAY, THURSDAY,FRIDAY,AND SUNDAY, Disp: 30 tablet, Rfl: 11    SYNTHROID 75 mcg tablet, TAKE 1 TABLET BY MOUTH TWICE A WEEK., Disp: 30 tablet, Rfl: 9    ascorbic acid, vitamin C, (VITAMIN C) 100 MG tablet, Take 600 mg by mouth as needed. , Disp: , Rfl:     BD LUER-PATTI SYRINGE 3 mL 25 x 1 1/2 " Syrg, U FOR B12 INJECTIONS UTD, Disp: , Rfl: 6    cloNIDine (CATAPRES) 0.1 MG tablet, Take 1 tablet (0.1 mg total) by mouth 3 (three) times daily as needed (PRN SBP > 165 mmHg). (Patient not taking: Reported on 9/23/2019), Disp: 90 tablet, Rfl: 6    cyanocobalamin 1,000 mcg/mL " "injection, Inject 0.1 mLs (100 mcg total) into the muscle once daily., Disp: 10 mL, Rfl: 0    cyanocobalamin-cobamamide (B12) 5,000-100 mcg Lozg, , Disp: , Rfl:     fish oil-omega-3 fatty acids 300-1,000 mg capsule, Take by mouth as needed. , Disp: , Rfl:     safety needles (EASY TOUCH FLIPLOCK NEEDLE) 25 gauge x 1 1/2" Ndle, b12 injection as directed, Disp: 50 each, Rfl: 6    syringe, disposable, 3 mL Syrg, Daily injection, Disp: 30 each, Rfl: 6    Current Facility-Administered Medications:     cyanocobalamin injection 1,000 mcg, 1,000 mcg, Intramuscular, Q30 Days, Harrison Ayala NP, 1,000 mcg at 09/26/19 0814    triamcinolone acetonide injection 5 mg, 5 mg, Other, 1 time in Clinic/HOD, Amie Sands MD    Physical Examination:  BP (!) 188/74   Pulse 77   Resp 20   Ht 5' 6" (1.676 m)   Wt 81.9 kg (180 lb 8.9 oz)   LMP 12/06/2013   BMI 29.14 kg/m²     GENERAL:  General appearance: Well, non-toxic appearing.  No apparent distress.  Fundi exam: normal.  Neck: supple.  Carotid auscultation: normal.  Heart auscultation: normal.  Peripheral pulses: normal.  Extremities: normal.    MENTAL STATUS:  Alertness, attention span & concentration: normal.  Language: normal.  Orientation to self, place & time:  normal.  Memory, recent & remote: normal.  Fund of knowledge: normal.    SPEECH:  Clear and fluent.  Follows complex commands.    CRANIAL NERVES:  Cranial Nerves II-XII were examined.  II - Visual fields: normal.  III, IV, VI: PERRL, EOMI, No ptosis, No nystagmus.  V - Facial sensation: normal.  VII - Face symmetry & mobility: normal.  VIII - Hearing: normal.  IX, X - Palate: mobile & midline.  XI - Shoulder shrug: normal.  XII - Tongue protrusion: normal.    GROSS MOTOR:  Gait & station: normal.  Tone: normal.  Abnormal movements: none.  Finger-nose & Heel-knee-shin: normal.  Rapid alternating movements & drift: normal.  Romberg: absent    MUSCLE STRENGTH:     Fascics Atrophy RIGHT    LEFT Atrophy " Fascics     5 Deltoids 5       5 Sh.Ext.Rot. 5       5 Sh.Int.Rot. 5       5 Biceps 5       5 Triceps 5       5 Finger Ext. 5       5 Finger Flex 5       5 FPL 5       5 Inteross. 5                         5 Iliopsoas 5       5 Hip Abduct 5       5 Hip Adduct 5       5 Quads 5       5 Hams 5       5 Dorsiflex 5       5 Plantar Flex 5       5 Ankle Miguel Angel 5       5 Ankle Invert 5       5 Toe Ext. 5       5 Toe Flex 5                         REFLEXES:    RIGHT Reflex   LEFT   2+ Biceps 2+   2+ Brachiorad. 2+   2+ Triceps 2+        2+ Patellar 2+   2+ Ankle 2+        Down PLANTAR Down     SENSORY:  Light touch: Normal throughout.  Sharp touch: Normal throughout.  Vibration: Normal throughout.      Diagnostic Data Reviewed:   Component      Latest Ref Rng & Units 10/28/2019 7/18/2019 5/25/2019 6/5/2018   Hemoglobin A1C External      4.0 - 5.6 %    5.1   Estimated Avg Glucose      68 - 131 mg/dL    100   SHAHZAD Screen      Negative <1:160       CPK      20 - 180 U/L       CRP      0.0 - 8.2 mg/L       Vit D, 25-Hydroxy      30 - 96 ng/mL   36    TSH      0.400 - 4.000 uIU/mL   1.536    Methlymalonic Acid      <0.40 umol/L  0.15     Folate      4.0 - 24.0 ng/mL  23.7     Vitamin B-12      210 - 950 pg/mL 781        Component      Latest Ref Rng & Units 7/23/2016 7/11/2016 6/30/2016   Hemoglobin A1C External      4.0 - 5.6 %      Estimated Avg Glucose      68 - 131 mg/dL      SHAHZAD Screen      Negative <1:160   Negative <1:160   CPK      20 - 180 U/L   41   CRP      0.0 - 8.2 mg/L 6.7 18.3 (H)    Vit D, 25-Hydroxy      30 - 96 ng/mL      TSH      0.400 - 4.000 uIU/mL      Methlymalonic Acid      <0.40 umol/L      Folate      4.0 - 24.0 ng/mL      Vitamin B-12      210 - 950 pg/mL        MRI L spine:  1. There is multilevel degenerative change including degenerative disc and facet disease.  There is no fracture.  There is mild degenerative retrolisthesis of L3 on L4.  There is disc space narrowing at the L2-3 and L3-4 levels.   There is no fracture.  There is some degree of disc bulge with osteophytic ridging and/or superimposed disc protrusion in addition to facet joint arthropathy.  These findings are discussed in detail by level above.  The pertinent findings are summarized below.  2. There is no spinal canal or foraminal stenosis at the T11-12, T12-L1, L1-2 or L5-S1 levels.  3. At the L2-3 level there is mild left foraminal stenosis.  There is a disc bulge eccentric to the left with left lateral annular fissure which may just contact the exiting left L2 root.  There is no spinal stenosis.  4. At the L3-4 level there is no spinal stenosis.  There is moderate crowding of the right lateral recess and there is mild right foraminal stenosis.  There is a disc bulge with superimposed broad shallow right paracentral disc protrusion.  5. At the L4-5 level there is mild spinal stenosis with moderate right and mild left foraminal stenosis.  There is a disc bulge with osteophytic ridging and small right foraminal disc protrusion.  There is facet joint arthropathy at this level with small bilateral facet joint effusions.  6. There is a probable 2.6 cm right renal cyst.  This is incompletely included and evaluated.    MRI C spine:  1. Stable postoperative changes of anterior cervical fusion at C5-6 and C6-7.  2. Multilevel degenerative changes of the cervical spine, as described above, which have progressed at C4-5 since 2014 and result in mild spinal canal stenosis and mild-to-moderate neural foraminal narrowing.    Assessment and Plan:  Brandie Acevedo is a 59 y.o., right handed woman who has had problems with sensory disturbance lower extremities as well as discomfort and difficulty walking. She has a history of paresthesias that resolved with B12 supplementation.  She has multilevel spinal disease as well as polymyalgia rheumatica.  Both of these could result in discomfort and a sense of weakness.    At this point her exam does not have a clear  underlying neuromuscular disorder.  Will broaden the evaluation with laboratory studies including getting a CK.    We discussed that B12 deficiency is likely the source of her paresthesias.  In addition, patients have been reported to developed neuropathic signs and symptoms after undergoing anesthesia which is believed to be secondary to leaching of B12, thus worsening B12 deficiency symptoms.  She has also been on methotrexate which can affect this.    Some of her complaints are highly suggestive of underlying and knee joint pathology, will advise her worker's compensation physician that an MRI of the knee may be beneficial to look at new or postoperative comlications.       Important to note, also  has a past medical history of Abnormal Pap smear of cervix, Cataract, Dizziness, DJD (degenerative joint disease) of cervical spine, DJD (degenerative joint disease) of cervical spine, Hemorrhoids, HTN (hypertension), Hypothyroidism, Polyarthritis rheumatica (2016), and Polymyalgia.           Camille Calle D.O, ABPN, AOBNP, ABEM    This note was created with voice recognition software.  Grammatical, syntax and spelling errors may be inevitable.

## 2019-10-21 NOTE — PATIENT INSTRUCTIONS
B12 deficiency can cause abnormal sensations that when treated early will resolve.      The cause of the B12 deficiency is unknown.  This could be related to medications used for anesthesia, or possibly your methotrexate.    Your knee symptoms do not sound to be secondary to a neurological syndrome and your exam is very good.  Advise an MRI of the knee to make sure there are no post-operative issues.  Will send this on to Dr. Tanner.    A CK level has been ordered to make sure there is no other source of muscle weakness.

## 2019-10-28 ENCOUNTER — CLINICAL SUPPORT (OUTPATIENT)
Dept: FAMILY MEDICINE | Facility: CLINIC | Age: 59
End: 2019-10-28
Payer: COMMERCIAL

## 2019-10-28 ENCOUNTER — LAB VISIT (OUTPATIENT)
Dept: LAB | Facility: HOSPITAL | Age: 59
End: 2019-10-28
Attending: FAMILY MEDICINE
Payer: COMMERCIAL

## 2019-10-28 DIAGNOSIS — E53.9 VITAMIN B DEFICIENCY: Primary | ICD-10-CM

## 2019-10-28 DIAGNOSIS — R29.898 COMPLAINTS OF LEG WEAKNESS: ICD-10-CM

## 2019-10-28 DIAGNOSIS — E53.9 VITAMIN B DEFICIENCY: ICD-10-CM

## 2019-10-28 DIAGNOSIS — G89.29 CHRONIC PAIN OF LEFT KNEE: ICD-10-CM

## 2019-10-28 DIAGNOSIS — M25.562 CHRONIC PAIN OF LEFT KNEE: ICD-10-CM

## 2019-10-28 LAB
CK SERPL-CCNC: 50 U/L (ref 20–180)
VIT B12 SERPL-MCNC: 781 PG/ML (ref 210–950)

## 2019-10-28 PROCEDURE — 36415 COLL VENOUS BLD VENIPUNCTURE: CPT | Mod: PN

## 2019-10-28 PROCEDURE — 96372 THER/PROPH/DIAG INJ SC/IM: CPT | Mod: S$GLB,,, | Performed by: NURSE PRACTITIONER

## 2019-10-28 PROCEDURE — 96372 PR INJECTION,THERAP/PROPH/DIAG2ST, IM OR SUBCUT: ICD-10-PCS | Mod: S$GLB,,, | Performed by: NURSE PRACTITIONER

## 2019-10-28 PROCEDURE — 82607 VITAMIN B-12: CPT

## 2019-10-28 PROCEDURE — 82550 ASSAY OF CK (CPK): CPT

## 2019-10-28 RX ADMIN — CYANOCOBALAMIN 1000 MCG: 1000 INJECTION, SOLUTION INTRAMUSCULAR; SUBCUTANEOUS at 08:10

## 2019-10-29 ENCOUNTER — PATIENT MESSAGE (OUTPATIENT)
Dept: FAMILY MEDICINE | Facility: CLINIC | Age: 59
End: 2019-10-29

## 2019-11-07 ENCOUNTER — HOSPITAL ENCOUNTER (OUTPATIENT)
Dept: RADIOLOGY | Facility: HOSPITAL | Age: 59
Discharge: HOME | End: 2019-11-07
Attending: INTERNAL MEDICINE
Payer: COMMERCIAL

## 2019-11-07 DIAGNOSIS — N28.1 RENAL CYST: ICD-10-CM

## 2019-11-07 PROCEDURE — 76770 US EXAM ABDO BACK WALL COMP: CPT | Mod: 26,,, | Performed by: RADIOLOGY

## 2019-11-07 PROCEDURE — 76770 US EXAM ABDO BACK WALL COMP: CPT | Mod: TC,PO

## 2019-11-07 PROCEDURE — 76770 US RETROPERITONEAL COMPLETE: ICD-10-PCS | Mod: 26,,, | Performed by: RADIOLOGY

## 2019-11-11 ENCOUNTER — PATIENT OUTREACH (OUTPATIENT)
Dept: OTHER | Facility: OTHER | Age: 59
End: 2019-11-11

## 2019-11-11 ENCOUNTER — TELEPHONE (OUTPATIENT)
Dept: NEUROLOGY | Facility: CLINIC | Age: 59
End: 2019-11-11

## 2019-11-11 ENCOUNTER — OFFICE VISIT (OUTPATIENT)
Dept: NEPHROLOGY | Facility: CLINIC | Age: 59
End: 2019-11-11
Payer: COMMERCIAL

## 2019-11-11 VITALS
WEIGHT: 183.88 LBS | SYSTOLIC BLOOD PRESSURE: 144 MMHG | HEIGHT: 66 IN | OXYGEN SATURATION: 99 % | HEART RATE: 76 BPM | DIASTOLIC BLOOD PRESSURE: 72 MMHG | BODY MASS INDEX: 29.55 KG/M2

## 2019-11-11 DIAGNOSIS — N28.1 RENAL CYST: Primary | ICD-10-CM

## 2019-11-11 PROCEDURE — 99999 PR PBB SHADOW E&M-EST. PATIENT-LVL III: CPT | Mod: PBBFAC,,, | Performed by: INTERNAL MEDICINE

## 2019-11-11 PROCEDURE — 3078F PR MOST RECENT DIASTOLIC BLOOD PRESSURE < 80 MM HG: ICD-10-PCS | Mod: CPTII,S$GLB,, | Performed by: INTERNAL MEDICINE

## 2019-11-11 PROCEDURE — 3008F BODY MASS INDEX DOCD: CPT | Mod: CPTII,S$GLB,, | Performed by: INTERNAL MEDICINE

## 2019-11-11 PROCEDURE — 3077F PR MOST RECENT SYSTOLIC BLOOD PRESSURE >= 140 MM HG: ICD-10-PCS | Mod: CPTII,S$GLB,, | Performed by: INTERNAL MEDICINE

## 2019-11-11 PROCEDURE — 99213 OFFICE O/P EST LOW 20 MIN: CPT | Mod: S$GLB,,, | Performed by: INTERNAL MEDICINE

## 2019-11-11 PROCEDURE — 99999 PR PBB SHADOW E&M-EST. PATIENT-LVL III: ICD-10-PCS | Mod: PBBFAC,,, | Performed by: INTERNAL MEDICINE

## 2019-11-11 PROCEDURE — 99213 PR OFFICE/OUTPT VISIT, EST, LEVL III, 20-29 MIN: ICD-10-PCS | Mod: S$GLB,,, | Performed by: INTERNAL MEDICINE

## 2019-11-11 PROCEDURE — 3077F SYST BP >= 140 MM HG: CPT | Mod: CPTII,S$GLB,, | Performed by: INTERNAL MEDICINE

## 2019-11-11 PROCEDURE — 3078F DIAST BP <80 MM HG: CPT | Mod: CPTII,S$GLB,, | Performed by: INTERNAL MEDICINE

## 2019-11-11 PROCEDURE — 3008F PR BODY MASS INDEX (BMI) DOCUMENTED: ICD-10-PCS | Mod: CPTII,S$GLB,, | Performed by: INTERNAL MEDICINE

## 2019-11-11 NOTE — PROGRESS NOTES
Digital Medicine: Clinician Follow-Up    Patient has fatigue in general and denies other s/s of hypotension  She would like to reduce diltiazem to 120 mg daily in future if possible    The history is provided by the patient.     Follow Up  Follow-up reason(s): reading review          INTERVENTION(S)  reviewed appropriate dose schedule and encouragement/support    PLAN  patient verbalizes understanding and continue monitoring    BP at goal. Continue current regimen.  Discussed possibly decreasing diltiazem if BP trends down or patient experiences s/s of hypotension. Reminded her to call me if she begins experiencing s/s of hypotension.      There are no preventive care reminders to display for this patient.    Last 5 Patient Entered Readings                                      Current 30 Day Average: 118/75     Recent Readings 11/8/2019 11/1/2019 11/1/2019 10/31/2019 10/30/2019    SBP (mmHg) 122 137 137 103 106    DBP (mmHg) 72 75 75 80 65    Pulse 76 - 59 66 69             Hypertension Medications             diltiaZEM (CARDIZEM CD) 180 MG 24 hr capsule Take 1 capsule (180 mg total) by mouth once daily.    cloNIDine (CATAPRES) 0.1 MG tablet Take 1 tablet (0.1 mg total) by mouth 3 (three) times daily as needed (PRN SBP > 165 mmHg).                             Medication Adherence Screening     She does not wonder if medications are working.  She knows purpose of medications.

## 2019-11-11 NOTE — PROGRESS NOTES
"Subjective:       Patient ID: Brandie Acevedo is a 59 y.o. White female who presents for return patient evaluation for renal cyst.    She is on prednisone and methotrexate for her PMR.  Her blood pressure is controlled at home.      Review of Systems   Constitutional: Negative for appetite change, chills and fever.   Eyes: Negative for visual disturbance.   Respiratory: Negative for cough and shortness of breath.    Cardiovascular: Negative for chest pain and leg swelling.   Gastrointestinal: Negative for diarrhea, nausea and vomiting.   Genitourinary: Negative for difficulty urinating, dysuria and hematuria.   Musculoskeletal: Positive for arthralgias. Negative for myalgias.   Skin: Negative for rash.   Neurological: Positive for headaches.   Psychiatric/Behavioral: Negative for sleep disturbance.       The past medical, family and social histories were reviewed for this encounter.       BP (!) 144/72   Pulse 76   Ht 5' 6" (1.676 m)   Wt 83.4 kg (183 lb 13.8 oz)   LMP 12/06/2013   SpO2 99%   BMI 29.68 kg/m²     Objective:      Physical Exam   Constitutional: She is oriented to person, place, and time. She appears well-developed and well-nourished. No distress.   HENT:   Head: Normocephalic and atraumatic.   Eyes: Conjunctivae are normal.   Neck: Neck supple. No JVD present.   Cardiovascular: Normal rate, regular rhythm and normal heart sounds. Exam reveals no gallop and no friction rub.   No murmur heard.  Pulmonary/Chest: Effort normal and breath sounds normal. No respiratory distress. She has no wheezes. She has no rales.   Abdominal: Soft. Bowel sounds are normal. She exhibits no distension. There is no tenderness.   Musculoskeletal: She exhibits no edema.   Neurological: She is alert and oriented to person, place, and time.   Skin: Skin is warm and dry. No rash noted.   Psychiatric: She has a normal mood and affect.   Vitals reviewed.      Assessment:       1. Renal cyst        Plan:   Return to clinic in " 18 months.  Labs for next visit include rp and renal US.  Baseline creatinine is 0.8.  I reassured her regarding her renal cyst.  Continue current medications as prescribed and reviewed.   Blood pressure is controlled on the current regimen.

## 2019-11-19 ENCOUNTER — PATIENT OUTREACH (OUTPATIENT)
Dept: OTHER | Facility: OTHER | Age: 59
End: 2019-11-19

## 2019-11-25 ENCOUNTER — CLINICAL SUPPORT (OUTPATIENT)
Dept: FAMILY MEDICINE | Facility: CLINIC | Age: 59
End: 2019-11-25
Payer: COMMERCIAL

## 2019-11-25 ENCOUNTER — LAB VISIT (OUTPATIENT)
Dept: LAB | Facility: HOSPITAL | Age: 59
End: 2019-11-25
Attending: FAMILY MEDICINE
Payer: COMMERCIAL

## 2019-11-25 DIAGNOSIS — E53.8 B12 DEFICIENCY: ICD-10-CM

## 2019-11-25 DIAGNOSIS — E53.9 VITAMIN B DEFICIENCY: Primary | ICD-10-CM

## 2019-11-25 LAB
ERYTHROCYTE [DISTWIDTH] IN BLOOD BY AUTOMATED COUNT: 13.9 % (ref 11.5–14.5)
HCT VFR BLD AUTO: 42.1 % (ref 37–48.5)
HGB BLD-MCNC: 13.5 G/DL (ref 12–16)
MCH RBC QN AUTO: 32.7 PG (ref 27–31)
MCHC RBC AUTO-ENTMCNC: 32.1 G/DL (ref 32–36)
MCV RBC AUTO: 102 FL (ref 82–98)
PLATELET # BLD AUTO: 354 K/UL (ref 150–350)
PMV BLD AUTO: 10.8 FL (ref 9.2–12.9)
RBC # BLD AUTO: 4.13 M/UL (ref 4–5.4)
VIT B12 SERPL-MCNC: 827 PG/ML (ref 210–950)
WBC # BLD AUTO: 6.46 K/UL (ref 3.9–12.7)

## 2019-11-25 PROCEDURE — 85027 COMPLETE CBC AUTOMATED: CPT

## 2019-11-25 PROCEDURE — 36415 COLL VENOUS BLD VENIPUNCTURE: CPT | Mod: PN

## 2019-11-25 PROCEDURE — 96372 PR INJECTION,THERAP/PROPH/DIAG2ST, IM OR SUBCUT: ICD-10-PCS | Mod: S$GLB,,, | Performed by: NURSE PRACTITIONER

## 2019-11-25 PROCEDURE — 96372 THER/PROPH/DIAG INJ SC/IM: CPT | Mod: S$GLB,,, | Performed by: NURSE PRACTITIONER

## 2019-11-25 PROCEDURE — 82607 VITAMIN B-12: CPT

## 2019-11-25 RX ADMIN — CYANOCOBALAMIN 1000 MCG: 1000 INJECTION, SOLUTION INTRAMUSCULAR; SUBCUTANEOUS at 08:11

## 2019-11-25 NOTE — PROGRESS NOTES
Administered medication as ordered, tolerated well, witnessed by Katherine PRATER LPN, 2 pt identifiers

## 2019-11-27 ENCOUNTER — OFFICE VISIT (OUTPATIENT)
Dept: DERMATOLOGY | Facility: CLINIC | Age: 59
End: 2019-11-27
Payer: COMMERCIAL

## 2019-11-27 VITALS — WEIGHT: 183 LBS | HEIGHT: 66 IN | BODY MASS INDEX: 29.41 KG/M2

## 2019-11-27 DIAGNOSIS — L90.9 ATROPHY OF SKIN: Primary | ICD-10-CM

## 2019-11-27 PROCEDURE — 99999 PR PBB SHADOW E&M-EST. PATIENT-LVL II: ICD-10-PCS | Mod: PBBFAC,,, | Performed by: DERMATOLOGY

## 2019-11-27 PROCEDURE — 3008F PR BODY MASS INDEX (BMI) DOCUMENTED: ICD-10-PCS | Mod: CPTII,S$GLB,, | Performed by: DERMATOLOGY

## 2019-11-27 PROCEDURE — 99213 PR OFFICE/OUTPT VISIT, EST, LEVL III, 20-29 MIN: ICD-10-PCS | Mod: S$GLB,,, | Performed by: DERMATOLOGY

## 2019-11-27 PROCEDURE — 99213 OFFICE O/P EST LOW 20 MIN: CPT | Mod: S$GLB,,, | Performed by: DERMATOLOGY

## 2019-11-27 PROCEDURE — 99999 PR PBB SHADOW E&M-EST. PATIENT-LVL II: CPT | Mod: PBBFAC,,, | Performed by: DERMATOLOGY

## 2019-11-27 PROCEDURE — 3008F BODY MASS INDEX DOCD: CPT | Mod: CPTII,S$GLB,, | Performed by: DERMATOLOGY

## 2019-11-27 NOTE — PROGRESS NOTES
Subjective:       Patient ID:  Brandie Acevedo is a 59 y.o. female who presents for   Chief Complaint   Patient presents with    Granuloma annulare     left elbow     LOV 8/6/2019  Granuloma annulare-left elbow  History thyroid disease on synthroid  TSH wnl 5/2019  -     triamcinolone acetonide injection 5 mg    Pt here today to f/u on left elbow, states GA is spreading    No personal or family h/o skin cancer  PMR on MTX and prednisone, recently decreasing dose.     Review of Systems   Constitutional: Negative for fever.   HENT: Negative for sore throat.    Respiratory: Negative for cough.    Gastrointestinal: Negative for nausea and vomiting.   Skin: Positive for activity-related sunscreen use and wears hat. Negative for itching, rash, dry skin and daily sunscreen use.   Hematologic/Lymphatic: Does not bruise/bleed easily.        Objective:    Physical Exam   Constitutional: She appears well-developed and well-nourished. No distress.   Neurological: She is alert and oriented to person, place, and time. She is not disoriented.   Psychiatric: She has a normal mood and affect.   Skin:   Areas Examined (abnormalities noted in diagram):   Head / Face Inspection Performed  Neck Inspection Performed  Chest / Axilla Inspection Performed  Back Inspection Performed  RUE Inspected  LUE Inspection Performed  Nails and Digits Inspection Performed              Diagram Legend     Erythematous scaling macule/papule c/w actinic keratosis       Vascular papule c/w angioma      Pigmented verrucoid papule/plaque c/w seborrheic keratosis      Yellow umbilicated papule c/w sebaceous hyperplasia      Irregularly shaped tan macule c/w lentigo     1-2 mm smooth white papules consistent with Milia      Movable subcutaneous cyst with punctum c/w epidermal inclusion cyst      Subcutaneous movable cyst c/w pilar cyst      Firm pink to brown papule c/w dermatofibroma      Pedunculated fleshy papule(s) c/w skin tag(s)      Evenly pigmented  macule c/w junctional nevus     Mildly variegated pigmented, slightly irregular-bordered macule c/w mildly atypical nevus      Flesh colored to evenly pigmented papule c/w intradermal nevus       Pink pearly papule/plaque c/w basal cell carcinoma      Erythematous hyperkeratotic cursted plaque c/w SCC      Surgical scar with no sign of skin cancer recurrence      Open and closed comedones      Inflammatory papules and pustules      Verrucoid papule consistent consistent with wart     Erythematous eczematous patches and plaques     Dystrophic onycholytic nail with subungual debris c/w onychomycosis     Umbilicated papule    Erythematous-base heme-crusted tan verrucoid plaque consistent with inflamed seborrheic keratosis     Erythematous Silvery Scaling Plaque c/w Psoriasis     See annotation      Assessment / Plan:        Atrophy of skin    no evidence active GA today  Mild erythema and xerosis/thinning consistent with steroid induced atrophy  cerave cream bid, likely to improve with time  Recommend nectifirm to neck          Follow up if symptoms worsen or fail to improve.

## 2019-12-03 ENCOUNTER — OFFICE VISIT (OUTPATIENT)
Dept: FAMILY MEDICINE | Facility: CLINIC | Age: 59
End: 2019-12-03
Payer: COMMERCIAL

## 2019-12-03 VITALS
BODY MASS INDEX: 29.76 KG/M2 | HEIGHT: 66 IN | HEART RATE: 65 BPM | TEMPERATURE: 98 F | WEIGHT: 185.19 LBS | RESPIRATION RATE: 18 BRPM | DIASTOLIC BLOOD PRESSURE: 86 MMHG | SYSTOLIC BLOOD PRESSURE: 144 MMHG | OXYGEN SATURATION: 98 %

## 2019-12-03 DIAGNOSIS — F43.20 ADJUSTMENT DISORDER, UNSPECIFIED TYPE: Primary | ICD-10-CM

## 2019-12-03 DIAGNOSIS — M35.3 PMR (POLYMYALGIA RHEUMATICA): ICD-10-CM

## 2019-12-03 DIAGNOSIS — Z79.52 CURRENT CHRONIC USE OF SYSTEMIC STEROIDS: ICD-10-CM

## 2019-12-03 DIAGNOSIS — E53.9 VITAMIN B DEFICIENCY: ICD-10-CM

## 2019-12-03 PROCEDURE — 90686 FLU VACCINE (QUAD) GREATER THAN OR EQUAL TO 3YO PRESERVATIVE FREE IM: ICD-10-PCS | Mod: S$GLB,,, | Performed by: FAMILY MEDICINE

## 2019-12-03 PROCEDURE — 3079F PR MOST RECENT DIASTOLIC BLOOD PRESSURE 80-89 MM HG: ICD-10-PCS | Mod: CPTII,S$GLB,, | Performed by: FAMILY MEDICINE

## 2019-12-03 PROCEDURE — 90471 IMMUNIZATION ADMIN: CPT | Mod: S$GLB,,, | Performed by: FAMILY MEDICINE

## 2019-12-03 PROCEDURE — 99214 OFFICE O/P EST MOD 30 MIN: CPT | Mod: 25,S$GLB,, | Performed by: FAMILY MEDICINE

## 2019-12-03 PROCEDURE — 99999 PR PBB SHADOW E&M-EST. PATIENT-LVL IV: CPT | Mod: PBBFAC,,, | Performed by: FAMILY MEDICINE

## 2019-12-03 PROCEDURE — 99999 PR PBB SHADOW E&M-EST. PATIENT-LVL IV: ICD-10-PCS | Mod: PBBFAC,,, | Performed by: FAMILY MEDICINE

## 2019-12-03 PROCEDURE — 90471 FLU VACCINE (QUAD) GREATER THAN OR EQUAL TO 3YO PRESERVATIVE FREE IM: ICD-10-PCS | Mod: S$GLB,,, | Performed by: FAMILY MEDICINE

## 2019-12-03 PROCEDURE — 3077F PR MOST RECENT SYSTOLIC BLOOD PRESSURE >= 140 MM HG: ICD-10-PCS | Mod: CPTII,S$GLB,, | Performed by: FAMILY MEDICINE

## 2019-12-03 PROCEDURE — 99214 PR OFFICE/OUTPT VISIT, EST, LEVL IV, 30-39 MIN: ICD-10-PCS | Mod: 25,S$GLB,, | Performed by: FAMILY MEDICINE

## 2019-12-03 PROCEDURE — 3077F SYST BP >= 140 MM HG: CPT | Mod: CPTII,S$GLB,, | Performed by: FAMILY MEDICINE

## 2019-12-03 PROCEDURE — 3079F DIAST BP 80-89 MM HG: CPT | Mod: CPTII,S$GLB,, | Performed by: FAMILY MEDICINE

## 2019-12-03 PROCEDURE — 3008F BODY MASS INDEX DOCD: CPT | Mod: CPTII,S$GLB,, | Performed by: FAMILY MEDICINE

## 2019-12-03 PROCEDURE — 3008F PR BODY MASS INDEX (BMI) DOCUMENTED: ICD-10-PCS | Mod: CPTII,S$GLB,, | Performed by: FAMILY MEDICINE

## 2019-12-03 PROCEDURE — 90686 IIV4 VACC NO PRSV 0.5 ML IM: CPT | Mod: S$GLB,,, | Performed by: FAMILY MEDICINE

## 2019-12-03 RX ORDER — DICLOFENAC SODIUM 20 MG/G
SOLUTION TOPICAL
Refills: 3 | COMMUNITY
Start: 2019-11-19

## 2019-12-03 NOTE — PROGRESS NOTES
"Subjective:       Patient ID: Brandie Acevedo is a 59 y.o. female.    Chief Complaint: Follow-up (6 month follow up visit )    HPI  Patient in the office for f/u and review.   She is still on low-dose pred per rheum for PMR. She continues the slow taper. Also on mtx.  On topical pennsaid for L knee pain per ortho.   She notes that pressure at home is variable both high and low. She recalls generally 115-130 systolic.     The 10-year ASCVD risk score ([place] TAYLOR Jr., et al., 2013) is: 5.6%    Values used to calculate the score:      Age: 59 years      Sex: Female      Is Non- : No      Diabetic: No      Tobacco smoker: No      Systolic Blood Pressure: 144 mmHg      Is BP treated: Yes      HDL Cholesterol: 62 mg/dL      Total Cholesterol: 221 mg/dL   Prev on plant sterols per cards/kymberly.    Review of Systems:  Review of Systems   Constitutional: Negative for activity change, fatigue and unexpected weight change.   HENT: Negative for hearing loss, rhinorrhea and trouble swallowing.    Eyes: Negative for discharge and visual disturbance.   Respiratory: Negative for chest tightness and wheezing.    Cardiovascular: Negative for chest pain and palpitations.   Gastrointestinal: Negative for blood in stool, constipation, diarrhea and vomiting.   Endocrine: Negative for polydipsia and polyuria.   Genitourinary: Negative for difficulty urinating, dysuria, hematuria and menstrual problem.   Musculoskeletal: Positive for arthralgias (knee bothers her at times, uses pennsaid). Negative for joint swelling and neck pain.   Neurological: Positive for headaches. Negative for weakness.   Psychiatric/Behavioral: Negative for confusion and dysphoric mood.       Objective:     Vitals:    12/03/19 0827   BP: (!) 144/86   BP Location: Right arm   Patient Position: Sitting   BP Method: Large (Manual)   Pulse: 65   Resp: 18   Temp: 98 °F (36.7 °C)   TempSrc: Oral   SpO2: 98%   Weight: 84 kg (185 lb 3 oz)   Height: 5' 6" " (1.676 m)          Physical Exam   Constitutional: She is oriented to person, place, and time. She appears well-developed and well-nourished. No distress.   HENT:   Head: Normocephalic and atraumatic.   Eyes: Conjunctivae are normal. Right eye exhibits no discharge. Left eye exhibits no discharge. No scleral icterus.   Neck: Normal range of motion. Neck supple.   Cardiovascular: Normal rate.   Pulmonary/Chest: Effort normal. No respiratory distress.   Abdominal: Soft. She exhibits no distension.   Musculoskeletal: Normal range of motion. She exhibits no edema.   Neurological: She is alert and oriented to person, place, and time. No cranial nerve deficit.   Skin: Skin is warm and dry. No rash noted.   Psychiatric: She has a normal mood and affect. Her behavior is normal.   Nursing note and vitals reviewed.        Assessment & Plan:  Adjustment disorder, unspecified type  Discussed sx monitoring, self-care during chronic medication management. Encouraged to consider counseling, and let me know of any continued sx.  Vitamin B deficiency  -     Vitamin B12; Future; Expected date: 12/03/2019  Improved. Can trial oral replacement and repeat b12 in 1 mo.  Current chronic use of systemic steroids  Pt on tapering low dose per rheum.   PMR (polymyalgia rheumatica)  Per rheum.   Other orders  -     Influenza - Quadrivalent (PF)

## 2019-12-10 ENCOUNTER — TELEPHONE (OUTPATIENT)
Dept: DERMATOLOGY | Facility: CLINIC | Age: 59
End: 2019-12-10

## 2019-12-18 RX ORDER — DILTIAZEM HYDROCHLORIDE 180 MG/1
CAPSULE, COATED, EXTENDED RELEASE ORAL
Qty: 90 CAPSULE | Refills: 1 | Status: SHIPPED | OUTPATIENT
Start: 2019-12-18 | End: 2020-06-02 | Stop reason: SDUPTHER

## 2019-12-24 ENCOUNTER — PATIENT OUTREACH (OUTPATIENT)
Dept: OTHER | Facility: OTHER | Age: 59
End: 2019-12-24

## 2019-12-24 NOTE — PROGRESS NOTES
Called due to upward trend of BP  Left voicemail requesting call back  Average 128/80 mmHg    Hypertension Medications             cloNIDine (CATAPRES) 0.1 MG tablet Take 1 tablet (0.1 mg total) by mouth 3 (three) times daily as needed (PRN SBP > 165 mmHg).    diltiaZEM (CARDIZEM CD) 180 MG 24 hr capsule TAKE ONE CAPSULE BY MOUTH EVERY DAY

## 2020-01-02 NOTE — PROGRESS NOTES
Digital [facility]: Health  Follow-Up    Patient has been taking B12 tablets for 1 month. Patient is doing blood work this Saturday.     The history is provided by the patient. No  was used.     Follow Up  Follow-up reason(s): reading review and routine education      Readings are trending up due to Holidays .    Routine Education Topics: eating patterns and physical activity      INTERVENTION(S)  encouragement/support, denied resources and denied questions    PLAN  patient verbalizes understanding, patient amenable to changes and continue monitoring      There are no preventive care reminders to display for this patient.    Last 5 Patient Entered Readings                                      Current 30 Day Average: 132/81     Recent Readings 1/2/2020 1/2/2020 12/24/2019 12/24/2019 12/21/2019    SBP (mmHg) 130 155 140 149 133    DBP (mmHg) 78 92 79 86 81    Pulse 58 60 62 65 67              Diet Screening       Barriers to a Healthy Diet: holidays/special events    Patient reports her blood pressure has been trending up due holiday foods. Patient reports she is getting back on track. Encouraged patient to continue to monitor sodium intake.    Assigning the following patient goals: maintain low sodium diet    Physical Activity Screening   When asked if exercising, patient responded: no    Patient set a goal to increase physical activity by walking more. Patient did not want to set a specific goal at this time.     Intervention(s): goal setting     Assigning the following patient goal(s): increase physical activity

## 2020-01-04 ENCOUNTER — LAB VISIT (OUTPATIENT)
Dept: LAB | Facility: HOSPITAL | Age: 60
End: 2020-01-04
Attending: FAMILY MEDICINE
Payer: COMMERCIAL

## 2020-01-04 DIAGNOSIS — E53.9 VITAMIN B DEFICIENCY: ICD-10-CM

## 2020-01-04 LAB — VIT B12 SERPL-MCNC: 660 PG/ML (ref 210–950)

## 2020-01-04 PROCEDURE — 36415 COLL VENOUS BLD VENIPUNCTURE: CPT | Mod: PO

## 2020-01-04 PROCEDURE — 82607 VITAMIN B-12: CPT

## 2020-01-06 ENCOUNTER — PATIENT MESSAGE (OUTPATIENT)
Dept: FAMILY MEDICINE | Facility: CLINIC | Age: 60
End: 2020-01-06

## 2020-01-06 DIAGNOSIS — G43.109 MIGRAINE WITH AURA AND WITHOUT STATUS MIGRAINOSUS, NOT INTRACTABLE: Primary | ICD-10-CM

## 2020-01-07 ENCOUNTER — TELEPHONE (OUTPATIENT)
Dept: NEUROLOGY | Facility: CLINIC | Age: 60
End: 2020-01-07

## 2020-01-07 DIAGNOSIS — E03.9 HYPOTHYROIDISM, UNSPECIFIED TYPE: ICD-10-CM

## 2020-01-07 RX ORDER — LEVOTHYROXINE SODIUM 75 UG/1
TABLET ORAL
Qty: 30 TABLET | Refills: 9 | Status: SHIPPED | OUTPATIENT
Start: 2020-01-07 | End: 2021-02-23

## 2020-01-07 NOTE — TELEPHONE ENCOUNTER
Called patient and scheduled appointment from referral. Patient expressed understanding and is aware of address.

## 2020-01-09 DIAGNOSIS — E03.9 HYPOTHYROIDISM: ICD-10-CM

## 2020-01-09 RX ORDER — LEVOTHYROXINE SODIUM 50 UG/1
TABLET ORAL
Qty: 30 TABLET | Refills: 11 | Status: SHIPPED | OUTPATIENT
Start: 2020-01-09 | End: 2021-03-04

## 2020-01-22 NOTE — PROGRESS NOTES
BP has trended back down  Average at goal, 127/80 mmHg  Continue current regimen  Closing encounter    Hypertension Medications             cloNIDine (CATAPRES) 0.1 MG tablet Take 1 tablet (0.1 mg total) by mouth 3 (three) times daily as needed (PRN SBP > 165 mmHg).    diltiaZEM (CARDIZEM CD) 180 MG 24 hr capsule TAKE ONE CAPSULE BY MOUTH EVERY DAY

## 2020-02-03 ENCOUNTER — TELEPHONE (OUTPATIENT)
Dept: OPTOMETRY | Facility: CLINIC | Age: 60
End: 2020-02-03

## 2020-02-03 ENCOUNTER — PATIENT MESSAGE (OUTPATIENT)
Dept: FAMILY MEDICINE | Facility: CLINIC | Age: 60
End: 2020-02-03

## 2020-02-03 DIAGNOSIS — E53.9 VITAMIN B DEFICIENCY: Primary | ICD-10-CM

## 2020-02-03 NOTE — TELEPHONE ENCOUNTER
----- Message from Pauly Polanco sent at 2/3/2020  3:47 PM CST -----  Contact: self  Type:  Sooner Apoointment Request    Caller is requesting a sooner appointment.  Caller declined first available appointment listed below.  Caller will not accept being placed on the waitlist and is requesting a message be sent to doctor.    Name of Caller:  patient  When is the first available appointment?  03/05  Symptoms:  Contact fitting appt  Best Call Back Number:  [phone]  Additional Information:  Patient changed her appt but the contact was not changed so please make the contact fitting 3/2

## 2020-02-04 ENCOUNTER — OFFICE VISIT (OUTPATIENT)
Dept: OBSTETRICS AND GYNECOLOGY | Facility: CLINIC | Age: 60
End: 2020-02-04
Payer: COMMERCIAL

## 2020-02-04 ENCOUNTER — PATIENT OUTREACH (OUTPATIENT)
Dept: ADMINISTRATIVE | Facility: OTHER | Age: 60
End: 2020-02-04

## 2020-02-04 ENCOUNTER — HOSPITAL ENCOUNTER (OUTPATIENT)
Dept: RADIOLOGY | Facility: HOSPITAL | Age: 60
Discharge: HOME | End: 2020-02-04
Attending: FAMILY MEDICINE
Payer: COMMERCIAL

## 2020-02-04 VITALS
SYSTOLIC BLOOD PRESSURE: 100 MMHG | BODY MASS INDEX: 29.23 KG/M2 | WEIGHT: 181.88 LBS | HEIGHT: 66 IN | WEIGHT: 181.88 LBS | HEIGHT: 66 IN | BODY MASS INDEX: 29.23 KG/M2 | DIASTOLIC BLOOD PRESSURE: 60 MMHG

## 2020-02-04 DIAGNOSIS — Z12.31 ENCOUNTER FOR SCREENING MAMMOGRAM FOR MALIGNANT NEOPLASM OF BREAST: ICD-10-CM

## 2020-02-04 DIAGNOSIS — Z12.31 ENCOUNTER FOR SCREENING MAMMOGRAM FOR MALIGNANT NEOPLASM OF BREAST: Primary | ICD-10-CM

## 2020-02-04 DIAGNOSIS — M35.3 PMR (POLYMYALGIA RHEUMATICA): ICD-10-CM

## 2020-02-04 DIAGNOSIS — Z01.419 GYNECOLOGIC EXAM NORMAL: Primary | ICD-10-CM

## 2020-02-04 PROCEDURE — 77067 SCR MAMMO BI INCL CAD: CPT | Mod: TC,PN

## 2020-02-04 PROCEDURE — 3078F DIAST BP <80 MM HG: CPT | Mod: CPTII,S$GLB,, | Performed by: OBSTETRICS & GYNECOLOGY

## 2020-02-04 PROCEDURE — 3074F SYST BP LT 130 MM HG: CPT | Mod: CPTII,S$GLB,, | Performed by: OBSTETRICS & GYNECOLOGY

## 2020-02-04 PROCEDURE — 99396 PR PREVENTIVE VISIT,EST,40-64: ICD-10-PCS | Mod: S$GLB,,, | Performed by: OBSTETRICS & GYNECOLOGY

## 2020-02-04 PROCEDURE — 77063 BREAST TOMOSYNTHESIS BI: CPT | Mod: 26,,, | Performed by: RADIOLOGY

## 2020-02-04 PROCEDURE — 99999 PR PBB SHADOW E&M-EST. PATIENT-LVL III: ICD-10-PCS | Mod: PBBFAC,,, | Performed by: OBSTETRICS & GYNECOLOGY

## 2020-02-04 PROCEDURE — 77063 MAMMO DIGITAL SCREENING BILAT WITH TOMOSYNTHESIS_CAD: ICD-10-PCS | Mod: 26,,, | Performed by: RADIOLOGY

## 2020-02-04 PROCEDURE — 77067 SCR MAMMO BI INCL CAD: CPT | Mod: 26,,, | Performed by: RADIOLOGY

## 2020-02-04 PROCEDURE — 3074F PR MOST RECENT SYSTOLIC BLOOD PRESSURE < 130 MM HG: ICD-10-PCS | Mod: CPTII,S$GLB,, | Performed by: OBSTETRICS & GYNECOLOGY

## 2020-02-04 PROCEDURE — 88175 CYTOPATH C/V AUTO FLUID REDO: CPT

## 2020-02-04 PROCEDURE — 77067 MAMMO DIGITAL SCREENING BILAT WITH TOMOSYNTHESIS_CAD: ICD-10-PCS | Mod: 26,,, | Performed by: RADIOLOGY

## 2020-02-04 PROCEDURE — 99999 PR PBB SHADOW E&M-EST. PATIENT-LVL III: CPT | Mod: PBBFAC,,, | Performed by: OBSTETRICS & GYNECOLOGY

## 2020-02-04 PROCEDURE — 99396 PREV VISIT EST AGE 40-64: CPT | Mod: S$GLB,,, | Performed by: OBSTETRICS & GYNECOLOGY

## 2020-02-04 PROCEDURE — 3078F PR MOST RECENT DIASTOLIC BLOOD PRESSURE < 80 MM HG: ICD-10-PCS | Mod: CPTII,S$GLB,, | Performed by: OBSTETRICS & GYNECOLOGY

## 2020-02-04 RX ORDER — MELATONIN 1 MG
TABLET, EXTENDED RELEASE ORAL
COMMUNITY
End: 2021-06-03

## 2020-02-04 RX ORDER — MAGNESIUM 30 MG
1 TABLET ORAL DAILY
COMMUNITY

## 2020-02-04 RX ORDER — AMOXICILLIN 500 MG
CAPSULE ORAL DAILY
COMMUNITY

## 2020-02-04 NOTE — PROGRESS NOTES
Chart reviewed. Care Everywhere updates requested. Immunizations reconciled.  updated.  Placed an order for a screening mammogram.

## 2020-02-04 NOTE — PROGRESS NOTES
Chief Complaint   Patient presents with    Well Woman    Mammogram       History of Present Illness: Brandie Acevedo is a 59 y.o. female that presents today 2020 for well gyn visit.    Past Medical History:   Diagnosis Date    Abnormal Pap smear of cervix     ascus     Cataract     Dizziness     DJD (degenerative joint disease) of cervical spine     DJD (degenerative joint disease) of cervical spine     Hemorrhoids     HTN (hypertension)     Hypothyroidism     Polyarthritis rheumatica 2016    Polymyalgia        Past Surgical History:   Procedure Laterality Date    BIOPSY OF TEMPORAL ARTERY Right 2018    Procedure: BIOPSY, ARTERY, TEMPORAL;  Surgeon: Jamie Hoang MD;  Location: [facility];  Service: General;  Laterality: Right;    cervical fusion       SECTION      COLONOSCOPY      COSMETIC SURGERY      rhinoplasty    DEXA      ,     KNEE ARTHROSCOPY W/ ACL RECONSTRUCTION Left     RHINOPLASTY TIP      WISDOM TOOTH EXTRACTION         Current Outpatient Medications   Medication Sig Dispense Refill    CALCIUM-MAGNESIUM-VITAMIN D2 ORAL Take by mouth once daily.      cloNIDine (CATAPRES) 0.1 MG tablet Take 1 tablet (0.1 mg total) by mouth 3 (three) times daily as needed (PRN SBP > 165 mmHg). 90 tablet 6    cyanocobalamin 1,000 mcg/mL injection Inject 0.1 mLs (100 mcg total) into the muscle once daily. 10 mL 0    diltiaZEM (CARDIZEM CD) 180 MG 24 hr capsule TAKE ONE CAPSULE BY MOUTH EVERY DAY 90 capsule 1    folic acid (FOLVITE) 1 MG tablet 1 TABLET BY MOUTH ONCE A DAY.  3    magnesium 30 mg Tab Take by mouth once.      melatonin 1 mg TbSR Take by mouth.      methotrexate 2.5 MG Tab 4 TABS BY MOUTH ONCE WEEKLY ALL AT THE SAME TIME.  3    multivitamin (THERAGRAN) per tablet Take 1 tablet by mouth once daily.      omega-3 fatty acids/fish oil (FISH OIL-OMEGA-3 FATTY ACIDS) 300-1,000 mg capsule Take by mouth once daily.      PENNSAID 20 mg/gram /actuation(2  %) sopm APPLY TWO PUMPS TOPICALL;Y TO AFFECTED AREA (S) TWICE DAILY  3    predniSONE (DELTASONE) 1 MG tablet Take 2 mg by mouth once daily.       SYNTHROID 50 mcg tablet TAKE 1 TABLET BY MOUTH EVERY MONDAY, TUESDAY, THURSDAY, FRIDAY, AND SUNDAY EVERY WEEK 30 tablet 11    SYNTHROID 75 mcg tablet TAKE 1 TABLET BY MOUTH TWICE WEEKLY 30 tablet 9    TURMERIC ORAL Take by mouth.       No current facility-administered medications for this visit.        Review of patient's allergies indicates:   Allergen Reactions    Iodine      Other reaction(s): Rash       Family History   Problem Relation Age of Onset    Heart failure Mother     Heart disease Mother     Osteoporosis Mother     Thyroid disease Mother     Heart failure Father     Heart disease Father     Hypertension Brother     Diabetes Maternal Grandfather     Breast cancer Neg Hx     Ovarian cancer Neg Hx        Social History     Socioeconomic History    Marital status:      Spouse name: Not on file    Number of children: 1    Years of education: Not on file    Highest education level: Not on file   Occupational History     Employer: [employer]   Social Needs    Financial resource strain: Not hard at all    Food insecurity:     Worry: Never true     Inability: Never true    Transportation needs:     Medical: No     Non-medical: No   Tobacco Use    Smoking status: Never Smoker    Smokeless tobacco: Never Used   Substance and Sexual Activity    Alcohol use: Yes     Alcohol/week: 1.7 standard drinks     Types: 2 Standard drinks or equivalent per week     Frequency: 2-4 times a month     Drinks per session: 1 or 2     Binge frequency: Never     Comment: less frequent on mtx    Drug use: No    Sexual activity: Yes     Partners: Male     Birth control/protection: None, Post-menopausal   Lifestyle    Physical activity:     Days per week: 0 days     Minutes per session: 0 min    Stress: Only a little   Relationships    Social  "connections:     Talks on phone: Once a week     Gets together: Once a week     Attends Restorationism service: Not on file     Active member of club or organization: No     Attends meetings of clubs or organizations: Not on file     Relationship status:    Other Topics Concern    Not on file   Social History Narrative    Works as an , originally from [place].       OB History    Para Term  AB Living   1 1 1     1   SAB TAB Ectopic Multiple Live Births           1      # Outcome Date GA Lbr Kevon/2nd Weight Sex Delivery Anes PTL Lv   1 Term    3.175 kg (7 lb) M CS-LTranv EPI N MESSI       Review of Symptoms:  GENERAL: Denies weight gain or weight loss. Feeling well overall.   SKIN: Denies rash or lesions.   HEAD: Denies head injury or headache.   NODES: Denies enlarged lymph nodes.   CHEST: Denies chest pain or shortness of breath.   CARDIOVASCULAR: Denies palpitations or left sided chest pain.   ABDOMEN: No abdominal pain, constipation, diarrhea, nausea, vomiting or rectal bleeding.   URINARY: No frequency, dysuria, hematuria, or burning on urination.  HEMATOLOGIC: No easy bruisability or excessive bleeding.   MUSCULOSKELETAL: Denies joint pain or swelling.     /60   Ht 5' 6" (1.676 m)   Wt 82.5 kg (181 lb 14.1 oz)   LMP 2013   Physical Exam:  APPEARANCE: Well nourished, well developed, in no acute distress.  SKIN: Normal skin turgor, no lesions.  NECK: Neck symmetric without masses   RESPIRATORY: Normal respiratory effort with no retractions or use of accessory muscles  CARDIOVASCULAR: Peripheral vascular system with no swelling no varicosities and palpation of pulses normal  LYMPHATIC: No enlargements of the lymph nodes noted in the neck, axillae, or groin  ABDOMEN: Soft. No tenderness or masses. No hepatosplenomegaly. No hernias.  BREASTS: Symmetrical, no skin changes or visible lesions. No palpable masses, nipple discharge or adenopathy bilaterally.  PELVIC: Normal external " female genitalia without lesions. Normal hair distribution. Adequate perineal body, normal urethral meatus. Urethra with no masses.  Bladder nontender. Vagina moist and well rugated without lesions or discharge. Cervix pink and without lesions. No significant cystocele or rectocele. Bimanual exam showed uterus normal size, shape, position, mobile and nontender. Adnexa without masses or tenderness. Urethra and bladder normal.   EXTREMITIES: No clubbing cyanosis or edema.    ASSESSMENT/PLAN:  Gynecologic exam normal  -     Liquid-Based Pap Smear, Screening    PMR (polymyalgia rheumatica)          Patient was counseled today on Pelvic exams and Pap Smear guidelines.   We discussed STD screening if at high risk for a STD.  We discussed recommendation for breast cancer screening with mammogram every other year after the age of 40 and annually after the age of 50.    We discussed colon cancer screening when indicated.   Osteoporosis screening discussed when indicated.   She was advised to see her primary care physician for all other health maintenance.     FOLLOW-UP with me for next routine visit.

## 2020-02-06 ENCOUNTER — PATIENT MESSAGE (OUTPATIENT)
Dept: DERMATOLOGY | Facility: CLINIC | Age: 60
End: 2020-02-06

## 2020-02-09 LAB — VIT B12 SERPL-MCNC: 1182 PG/ML (ref 200–1100)

## 2020-03-01 LAB
FINAL PATHOLOGIC DIAGNOSIS: NORMAL
Lab: NORMAL

## 2020-03-02 ENCOUNTER — OFFICE VISIT (OUTPATIENT)
Dept: OPTOMETRY | Facility: CLINIC | Age: 60
End: 2020-03-02
Payer: COMMERCIAL

## 2020-03-02 DIAGNOSIS — H52.203 MYOPIA WITH ASTIGMATISM AND PRESBYOPIA, BILATERAL: ICD-10-CM

## 2020-03-02 DIAGNOSIS — H52.13 MYOPIA WITH ASTIGMATISM AND PRESBYOPIA, BILATERAL: ICD-10-CM

## 2020-03-02 DIAGNOSIS — Z13.5 GLAUCOMA SCREENING: ICD-10-CM

## 2020-03-02 DIAGNOSIS — Z46.0 CONTACT LENS/GLASSES FITTING: Primary | ICD-10-CM

## 2020-03-02 DIAGNOSIS — Z46.0 CONTACT LENS/GLASSES FITTING: ICD-10-CM

## 2020-03-02 DIAGNOSIS — H25.13 NUCLEAR SCLEROSIS, BILATERAL: ICD-10-CM

## 2020-03-02 DIAGNOSIS — H52.4 MYOPIA WITH ASTIGMATISM AND PRESBYOPIA, BILATERAL: ICD-10-CM

## 2020-03-02 DIAGNOSIS — H43.393 VITREOUS FLOATERS, BILATERAL: Primary | ICD-10-CM

## 2020-03-02 PROCEDURE — 99999 PR PBB SHADOW E&M-EST. PATIENT-LVL I: CPT | Mod: PBBFAC,,, | Performed by: OPTOMETRIST

## 2020-03-02 PROCEDURE — 92015 DETERMINE REFRACTIVE STATE: CPT | Mod: S$GLB,,, | Performed by: OPTOMETRIST

## 2020-03-02 PROCEDURE — 99999 PR PBB SHADOW E&M-EST. PATIENT-LVL III: ICD-10-PCS | Mod: PBBFAC,,, | Performed by: OPTOMETRIST

## 2020-03-02 PROCEDURE — 92015 PR REFRACTION: ICD-10-PCS | Mod: S$GLB,,, | Performed by: OPTOMETRIST

## 2020-03-02 PROCEDURE — 92014 COMPRE OPH EXAM EST PT 1/>: CPT | Mod: S$GLB,,, | Performed by: OPTOMETRIST

## 2020-03-02 PROCEDURE — 92014 PR EYE EXAM, EST PATIENT,COMPREHESV: ICD-10-PCS | Mod: S$GLB,,, | Performed by: OPTOMETRIST

## 2020-03-02 PROCEDURE — 99999 PR PBB SHADOW E&M-EST. PATIENT-LVL I: ICD-10-PCS | Mod: PBBFAC,,, | Performed by: OPTOMETRIST

## 2020-03-02 PROCEDURE — 92310 CONTACT LENS FITTING OU: CPT | Mod: CSM,,, | Performed by: OPTOMETRIST

## 2020-03-02 PROCEDURE — 99499 UNLISTED E&M SERVICE: CPT | Mod: S$GLB,,, | Performed by: OPTOMETRIST

## 2020-03-02 PROCEDURE — 92310 PR CONTACT LENS FITTING (NO CHANGE): ICD-10-PCS | Mod: CSM,,, | Performed by: OPTOMETRIST

## 2020-03-02 PROCEDURE — 99499 NO LOS: ICD-10-PCS | Mod: S$GLB,,, | Performed by: OPTOMETRIST

## 2020-03-02 PROCEDURE — 99999 PR PBB SHADOW E&M-EST. PATIENT-LVL III: CPT | Mod: PBBFAC,,, | Performed by: OPTOMETRIST

## 2020-03-02 NOTE — PROGRESS NOTES
HPI     Routine eye exam--dle-2/5/19    Pt denies any blurred vision. Needing updated contact lens rx-has not been   wearing cl lately. Denies any eye pain. No flashes or floaters.     Agree above  Wear rgp / soft skirt lens in past, unsure if MF lenses  Wants cl's for weekend wear, has worn monovision in past  No other new issues      Last edited by LUIZ Mckeon, OD on 3/2/2020  9:18 AM. (History)        ROS     Positive for: Eyes    Negative for: Constitutional, Gastrointestinal, Neurological, Skin,   Genitourinary, Musculoskeletal, HENT, Endocrine, Cardiovascular,   Respiratory, Psychiatric, Allergic/Imm, Heme/Lymph    Last edited by LUIZ Mckeon, OD on 3/2/2020  8:41 AM. (History)        Assessment /Plan     For exam results, see Encounter Report.    Vitreous floaters, bilateral    Nuclear sclerosis, bilateral    Glaucoma screening    Myopia with astigmatism and presbyopia, bilateral    Contact lens/glasses fitting      1. RD precautions given  2. Early changes, not vis sig for consult   No PSC  3. Low suspect w/ some asymmetry, monitor  4. Updated specs rx, gave copy  5. Dispense trials torics for monovision----good initial result  Call/ message with report, then will release clrx pending    DAILY WEAR CONTACT LENSES  Continue with Daily Wear of contact lenses, up to all waking hours.  Continue with approved contact lens disinfection / rewetting drops as discussed.  Dispose of lenses monthly.  Stop wearing your lenses and call our office if redness, blurred vision, or pain persists more than 12 hours.  We recommend an annual eye exam for contact lens patients.    Discussed and educated patient on current findings /plan.  RTC 1 year, prn if any changes / issues                    Patient was not available for their therapy session at this time.  Reason not seen: Nursing with patient;Other (comment) (just finished with PT ) (03/10/18 1500).  Re-Attempt Plan: Will re-attempt per established treatment plan (03/10/18 1500).

## 2020-03-02 NOTE — PATIENT INSTRUCTIONS
FLASHES / FLOATERS / POSTERIOR VITREOUS DETACHMENT    Call the clinic if you have any further changes in symptoms.  Including:  Increased numbers of floaters or flashing lights, dimness or darkness that moves through or stays constant in your vision, or any pain in the eye (s).    You may sometimes see small specks or clouds moving in your field of vision.  They are called FLOATERS.  You can often see them when looking at a plain background, like a blank wall or blue breanne.  Floaters are actually tiny clumps of gel or cells inside the VITREOUS, the clear jelly-like fluid that fills the inside of your eye.    While these objects look like they are in front of your eye, they are actually floating inside.  What you see are the shadows they cast on the RETINA, the nerve layer at the back of the eye that senses light and allows you to see.      POSTERIOR VITREOUS DETACHMENT    The appearance of new floaters may be alarming.  If you suddenly develop new floaters, you should contact your eye care professional  right away.    The retina can tear if the shrinking vitreous pulls away from the wall of the eye.  This sometimes causes a small amount of bleeding in the eye that may appear as new floaters.    A torn retina is always a serious problem, since it can lead to a retinal detachment.  You should see your eye care professional as soon as possible if:     even one new floater appears suddenly;   you see sudden flashes of light;   you notice other symptoms, like the loss of side vision, or a curtain closes down in your vision        POSTERIOR VITREOUS DETACHMENT is more common for people who:     are nearsighted;   have had cataract surgery;   have had YAG laser surgery of the eye;   have had inflammation inside the eye;   are over age 60.      While some floaters may remain visible, many of them will fade over time and become less noticeable.  Even if you've had some floaters for years, you should have your eyes  "checked as soon as possible if you notice new ones.    FLASHING LIGHTS    When the vitreous gel rubs or pulls on the retina, you may see what look like flashing lights or lightning streaks.  These flashes can appear off and on for several weeks or months.      Some people experience flashes of light that appear as jagged lines or heat waves in both eyes, lasting 10-20 minutes.  These flashes are caused by a spasm of blood vessels in the brain, which is called a migraine.    If a headache follows these flashes, it's called a migraine headache.  If   no headache occurs, these flashes are called Ophthalmic or Ocular Migraine.            DRY EYES:  Use Over The Counter artificial tears as needed for dry eye symptoms.  Some common brands include:  Systane, Optive, and Refresh.  These drops can be used as frequently as desired, but may be most helpful use during long periods of concentrated work.  For example, reading / working at the computer.  Ophthalmic gel or ointments are available: Refresh PM, Genteal, and Lacrilube.  Avoid drops that "get redness out" (Visine, Murine, Clear Eyes), as these may contain medication that could further irritate the eyes.    ALLERGY EYES / ITCHING SYMPTOMS:  Over the counter medications include--Zaditor and Alaway  Use as directed 1-2 drops daily for symptoms of itching / watering eyes.  These drops will not help for dry eye or exposure symptoms.    REDNESS RELIEF:  Lumify---is a good redness reliever that will not cause chronic irritation.        DAILY WEAR CONTACT LENSES  Continue with Daily Wear of contact lenses, up to all waking hours.  Continue with approved contact lens disinfection / rewetting drops as discussed.  Dispose of lenses monthly.  Stop wearing your lenses and call our office if redness, blurred vision, or pain persists more than 12 hours.  We recommend an annual eye exam for contact lens patients.    "

## 2020-03-13 ENCOUNTER — PATIENT MESSAGE (OUTPATIENT)
Dept: OPTOMETRY | Facility: CLINIC | Age: 60
End: 2020-03-13

## 2020-03-19 ENCOUNTER — PATIENT MESSAGE (OUTPATIENT)
Dept: NEUROLOGY | Facility: CLINIC | Age: 60
End: 2020-03-19

## 2020-03-25 ENCOUNTER — PATIENT MESSAGE (OUTPATIENT)
Dept: NEUROLOGY | Facility: CLINIC | Age: 60
End: 2020-03-25

## 2020-03-26 ENCOUNTER — TELEPHONE (OUTPATIENT)
Dept: NEUROLOGY | Facility: CLINIC | Age: 60
End: 2020-03-26

## 2020-03-26 NOTE — TELEPHONE ENCOUNTER
Called patient and informed that we need to postpone appointment until further notice due to COVID-19. Informed patient that we will call back to reschedule appointment when we are able to start seeing patients in clinic again. Patient expressed understanding.

## 2020-05-25 ENCOUNTER — TELEPHONE (OUTPATIENT)
Dept: CARDIOLOGY | Facility: CLINIC | Age: 60
End: 2020-05-25

## 2020-05-25 NOTE — TELEPHONE ENCOUNTER
----- Message from Rafaela Coleman sent at 5/25/2020  3:49 PM CDT -----  Contact: [phone]  Patient called and asked if you will send her labs to [facility] to be done   Tomorrow.  Call back [phone]

## 2020-05-27 ENCOUNTER — HOSPITAL ENCOUNTER (OUTPATIENT)
Dept: CARDIOLOGY | Facility: HOSPITAL | Age: 60
Discharge: HOME | End: 2020-05-27
Attending: INTERNAL MEDICINE
Payer: COMMERCIAL

## 2020-05-27 DIAGNOSIS — I10 ESSENTIAL HYPERTENSION: ICD-10-CM

## 2020-05-27 DIAGNOSIS — M35.3 PMR (POLYMYALGIA RHEUMATICA): ICD-10-CM

## 2020-05-27 DIAGNOSIS — Z82.49 FAMILY HISTORY OF CARDIOVASCULAR DISEASE: ICD-10-CM

## 2020-05-27 LAB
AORTIC ROOT ANNULUS: 2.45 CM
AORTIC VALVE CUSP SEPERATION: 1.54 CM
AV INDEX (PROSTH): 0.79
AV MEAN GRADIENT: 5 MMHG
AV PEAK GRADIENT: 10 MMHG
AV VALVE AREA: 2.37 CM2
AV VELOCITY RATIO: 0.7
CV ECHO LV RWT: 0.47 CM
DOP CALC AO PEAK VEL: 1.55 M/S
DOP CALC AO VTI: 32.48 CM
DOP CALC LVOT AREA: 3 CM2
DOP CALC LVOT DIAMETER: 1.96 CM
DOP CALC LVOT PEAK VEL: 1.08 M/S
DOP CALC LVOT STROKE VOLUME: 76.9 CM3
DOP CALCLVOT PEAK VEL VTI: 25.5 CM
E WAVE DECELERATION TIME: 198.95 MSEC
E/A RATIO: 0.94
E/E' RATIO: 11.75 M/S
ECHO LV POSTERIOR WALL: 1 CM (ref 0.6–1.1)
FRACTIONAL SHORTENING: 48 % (ref 28–44)
INTERVENTRICULAR SEPTUM: 1 CM (ref 0.6–1.1)
IVRT: 62.8 MSEC
LA MAJOR: 5.33 CM
LA MINOR: 5.34 CM
LA WIDTH: 3.03 CM
LEFT ATRIUM SIZE: 2.8 CM
LEFT ATRIUM VOLUME: 38.47 CM3
LEFT INTERNAL DIMENSION IN SYSTOLE: 2.23 CM (ref 2.1–4)
LEFT VENTRICLE DIASTOLIC VOLUME: 81.81 ML
LEFT VENTRICLE SYSTOLIC VOLUME: 16.85 ML
LEFT VENTRICULAR INTERNAL DIMENSION IN DIASTOLE: 4.3 CM (ref 3.5–6)
LEFT VENTRICULAR MASS: 142.49 G
LV LATERAL E/E' RATIO: 13.43 M/S
LV SEPTAL E/E' RATIO: 10.44 M/S
MV PEAK A VEL: 1 M/S
MV PEAK E VEL: 0.94 M/S
PISA TR MAX VEL: 2.23 M/S
PULM VEIN S/D RATIO: 1.34
PV PEAK D VEL: 0.44 M/S
PV PEAK S VEL: 0.59 M/S
PV PEAK VELOCITY: 0.96 CM/S
RA PRESSURE: 3 MMHG
RIGHT VENTRICULAR END-DIASTOLIC DIMENSION: 2.23 CM
TDI LATERAL: 0.07 M/S
TDI SEPTAL: 0.09 M/S
TDI: 0.08 M/S
TR MAX PG: 20 MMHG
TV REST PULMONARY ARTERY PRESSURE: 23 MMHG

## 2020-05-27 PROCEDURE — 93306 TRANSTHORACIC ECHO (TTE) COMPLETE (CUPID ONLY): ICD-10-PCS | Mod: 26,,, | Performed by: INTERNAL MEDICINE

## 2020-05-27 PROCEDURE — 93306 TTE W/DOPPLER COMPLETE: CPT | Mod: 26,,, | Performed by: INTERNAL MEDICINE

## 2020-05-27 PROCEDURE — 93306 TTE W/DOPPLER COMPLETE: CPT

## 2020-06-02 ENCOUNTER — OFFICE VISIT (OUTPATIENT)
Dept: CARDIOLOGY | Facility: CLINIC | Age: 60
End: 2020-06-02
Attending: INTERNAL MEDICINE
Payer: COMMERCIAL

## 2020-06-02 VITALS
BODY MASS INDEX: 28.84 KG/M2 | SYSTOLIC BLOOD PRESSURE: 139 MMHG | WEIGHT: 179.44 LBS | DIASTOLIC BLOOD PRESSURE: 72 MMHG | HEIGHT: 66 IN | HEART RATE: 76 BPM

## 2020-06-02 DIAGNOSIS — Z82.49 FAMILY HISTORY OF CARDIOVASCULAR DISEASE: Chronic | ICD-10-CM

## 2020-06-02 DIAGNOSIS — M35.3 PMR (POLYMYALGIA RHEUMATICA): ICD-10-CM

## 2020-06-02 DIAGNOSIS — I10 ESSENTIAL HYPERTENSION: Primary | ICD-10-CM

## 2020-06-02 PROCEDURE — 99214 OFFICE O/P EST MOD 30 MIN: CPT | Mod: S$GLB,,, | Performed by: INTERNAL MEDICINE

## 2020-06-02 PROCEDURE — 99999 PR PBB SHADOW E&M-EST. PATIENT-LVL III: ICD-10-PCS | Mod: PBBFAC,,, | Performed by: INTERNAL MEDICINE

## 2020-06-02 PROCEDURE — 3075F PR MOST RECENT SYSTOLIC BLOOD PRESS GE 130-139MM HG: ICD-10-PCS | Mod: CPTII,S$GLB,, | Performed by: INTERNAL MEDICINE

## 2020-06-02 PROCEDURE — 3008F BODY MASS INDEX DOCD: CPT | Mod: CPTII,S$GLB,, | Performed by: INTERNAL MEDICINE

## 2020-06-02 PROCEDURE — 99214 PR OFFICE/OUTPT VISIT, EST, LEVL IV, 30-39 MIN: ICD-10-PCS | Mod: S$GLB,,, | Performed by: INTERNAL MEDICINE

## 2020-06-02 PROCEDURE — 3078F DIAST BP <80 MM HG: CPT | Mod: CPTII,S$GLB,, | Performed by: INTERNAL MEDICINE

## 2020-06-02 PROCEDURE — 3078F PR MOST RECENT DIASTOLIC BLOOD PRESSURE < 80 MM HG: ICD-10-PCS | Mod: CPTII,S$GLB,, | Performed by: INTERNAL MEDICINE

## 2020-06-02 PROCEDURE — 3075F SYST BP GE 130 - 139MM HG: CPT | Mod: CPTII,S$GLB,, | Performed by: INTERNAL MEDICINE

## 2020-06-02 PROCEDURE — 3008F PR BODY MASS INDEX (BMI) DOCUMENTED: ICD-10-PCS | Mod: CPTII,S$GLB,, | Performed by: INTERNAL MEDICINE

## 2020-06-02 PROCEDURE — 99999 PR PBB SHADOW E&M-EST. PATIENT-LVL III: CPT | Mod: PBBFAC,,, | Performed by: INTERNAL MEDICINE

## 2020-06-02 RX ORDER — DILTIAZEM HYDROCHLORIDE 180 MG/1
180 CAPSULE, COATED, EXTENDED RELEASE ORAL DAILY
Qty: 90 CAPSULE | Refills: 4 | Status: SHIPPED | OUTPATIENT
Start: 2020-06-02 | End: 2021-01-13

## 2020-06-02 NOTE — PROGRESS NOTES
Subjective:    Patient ID:  Brandie Richmond is a 59 y.o. female who presents for follow-up of Hypertension      HPI  Here for f/u HTN/fam HX CAD/PMR. Remains active, no CP.     Review of Systems   Constitution: Negative for malaise/fatigue.   Eyes: Negative for blurred vision.   Cardiovascular: Negative for chest pain, claudication, cyanosis, dyspnea on exertion, irregular heartbeat, leg swelling, near-syncope, orthopnea, palpitations, paroxysmal nocturnal dyspnea and syncope.   Respiratory: Negative for cough and shortness of breath.    Hematologic/Lymphatic: Does not bruise/bleed easily.   Musculoskeletal: Negative for back pain, falls, joint pain, muscle cramps, muscle weakness and myalgias.   Gastrointestinal: Negative for abdominal pain, change in bowel habit, nausea and vomiting.   Genitourinary: Negative for urgency.   Neurological: Negative for dizziness, focal weakness and light-headedness.       Past Medical History:   Diagnosis Date    Abnormal Pap smear of cervix     ascus 2013    Dizziness     DJD (degenerative joint disease) of cervical spine     DJD (degenerative joint disease) of cervical spine     Hemorrhoids     HTN (hypertension)     Hypothyroidism     Polyarthritis rheumatica 2016     Patient Active Problem List   Diagnosis    Headache, new daily persistent (NDPH)    DJD (degenerative joint disease), cervical    Hypothyroidism    Family history of cardiovascular disease    Essential hypertension    Current chronic use of systemic steroids    PMR (polymyalgia rheumatica)        Objective:     Vitals:    06/02/20 1501   BP: 139/72   Pulse: 76        Physical Exam   Constitutional: She is oriented to person, place, and time. She appears well-developed and well-nourished.   HENT:   Head: Normocephalic.   Eyes: Conjunctivae are normal.   Neck: Normal range of motion. Neck supple. No JVD present.   Cardiovascular: Normal rate, regular rhythm, normal heart sounds and intact distal pulses.    Pulses:       Carotid pulses are 2+ on the right side, and 2+ on the left side.       Radial pulses are 2+ on the right side, and 2+ on the left side.        Dorsalis pedis pulses are 2+ on the right side, and 2+ on the left side.        Posterior tibial pulses are 2+ on the right side, and 2+ on the left side.   Pulmonary/Chest: Effort normal and breath sounds normal.   Abdominal: Soft. Bowel sounds are normal.   Musculoskeletal: She exhibits no edema or tenderness.   Neurological: She is alert and oriented to person, place, and time. Gait normal.   Skin: Skin is warm, dry and intact. No cyanosis. Nails show no clubbing.   Psychiatric: She has a normal mood and affect. Her speech is normal and behavior is normal. Thought content normal.   Nursing note and vitals reviewed.            ..    Chemistry        Component Value Date/Time     11/07/2019 0748    K 4.0 11/07/2019 0748     11/07/2019 0748    CO2 25 11/07/2019 0748    BUN 17 11/07/2019 0748    CREATININE 0.9 11/07/2019 0748    GLU 90 11/07/2019 0748        Component Value Date/Time    CALCIUM 9.5 11/07/2019 0748    ALKPHOS 94 04/20/2019 0751    AST 16 04/20/2019 0751    ALT 14 04/20/2019 0751    BILITOT 0.4 04/20/2019 0751    ESTGFRAFRICA >60.0 11/07/2019 0748    EGFRNONAA >60.0 11/07/2019 0748            ..  Lab Results   Component Value Date    CHOL 221 (H) 04/20/2019    CHOL 208 (H) 06/05/2018    CHOL 211 (H) 05/11/2018     Lab Results   Component Value Date    HDL 62 04/20/2019    HDL 64 06/05/2018    HDL 70 05/11/2018     Lab Results   Component Value Date    LDLCALC 134.0 04/20/2019    LDLCALC 111.4 06/05/2018    LDLCALC 108.6 05/11/2018     Lab Results   Component Value Date    TRIG 125 04/20/2019    TRIG 163 (H) 06/05/2018    TRIG 162 (H) 05/11/2018     Lab Results   Component Value Date    CHOLHDL 28.1 04/20/2019    CHOLHDL 30.8 06/05/2018    CHOLHDL 33.2 05/11/2018     ..  Lab Results   Component Value Date    WBC 6.46 11/25/2019    HGB  13.5 11/25/2019    HCT 42.1 11/25/2019     (H) 11/25/2019     (H) 11/25/2019       Test(s) Reviewed  I have reviewed the following in detail:  [] Stress test   [] Angiography   [x] Echocardiogram   [x] Labs   [x] Other:       Assessment:         ICD-10-CM ICD-9-CM   1. Essential hypertension I10 401.9   2. Family history of cardiovascular disease Z82.49 V17.49   3. PMR (polymyalgia rheumatica) M35.3 725     Problem List Items Addressed This Visit     PMR (polymyalgia rheumatica)    Family history of cardiovascular disease (Chronic)    Essential hypertension - Primary           Plan:           Return to clinic 1 year   Low level/low impact aerobic exercise 5x's/wk. Heart healthy diet and risk factor modification.    See labs and med orders.    Portions of this note may have been created with voice recognition software.  Grammatical, syntax and spelling errors may be inevitable.

## 2020-06-04 ENCOUNTER — TELEPHONE (OUTPATIENT)
Dept: DERMATOLOGY | Facility: CLINIC | Age: 60
End: 2020-06-04

## 2020-06-04 NOTE — TELEPHONE ENCOUNTER
Spoke with patient earlier, she is needing Nectifim and Intellishade matte.  Advised that we have both here at the office and she can come by.  She iwll pick them up at the central registration desk on the first floor.

## 2020-06-04 NOTE — TELEPHONE ENCOUNTER
----- Message from Sabino Wilcox sent at 6/4/2020 12:27 PM CDT -----  Contact: pt  Type: Needs Medical Advice    Who Called:  pt    Best Call Back Number: [phone]  Additional Information: pt purchased products from  and needs to know how to get more. Please call to discuss.

## 2020-06-24 ENCOUNTER — OFFICE VISIT (OUTPATIENT)
Dept: FAMILY MEDICINE | Facility: CLINIC | Age: 60
End: 2020-06-24
Payer: COMMERCIAL

## 2020-06-24 ENCOUNTER — PATIENT MESSAGE (OUTPATIENT)
Dept: FAMILY MEDICINE | Facility: CLINIC | Age: 60
End: 2020-06-24

## 2020-06-24 ENCOUNTER — LAB VISIT (OUTPATIENT)
Dept: LAB | Facility: HOSPITAL | Age: 60
End: 2020-06-24
Attending: FAMILY MEDICINE
Payer: COMMERCIAL

## 2020-06-24 VITALS
OXYGEN SATURATION: 98 % | HEIGHT: 66 IN | WEIGHT: 179.13 LBS | HEART RATE: 62 BPM | TEMPERATURE: 98 F | SYSTOLIC BLOOD PRESSURE: 148 MMHG | BODY MASS INDEX: 28.79 KG/M2 | DIASTOLIC BLOOD PRESSURE: 86 MMHG

## 2020-06-24 DIAGNOSIS — N30.00 ACUTE CYSTITIS WITHOUT HEMATURIA: ICD-10-CM

## 2020-06-24 DIAGNOSIS — I10 ESSENTIAL HYPERTENSION: ICD-10-CM

## 2020-06-24 DIAGNOSIS — N30.00 ACUTE CYSTITIS WITHOUT HEMATURIA: Primary | ICD-10-CM

## 2020-06-24 DIAGNOSIS — M35.3 PMR (POLYMYALGIA RHEUMATICA): ICD-10-CM

## 2020-06-24 LAB
BILIRUB SERPL-MCNC: NORMAL MG/DL
BLOOD URINE, POC: NORMAL
CLARITY, POC UA: CLEAR
COLOR, POC UA: NORMAL
GLUCOSE UR QL STRIP: NORMAL
KETONES UR QL STRIP: NORMAL
LEUKOCYTE ESTERASE URINE, POC: NORMAL
NITRITE, POC UA: NORMAL
PH, POC UA: 6
PROTEIN, POC: NORMAL
SPECIFIC GRAVITY, POC UA: 1.02
UROBILINOGEN, POC UA: NORMAL

## 2020-06-24 PROCEDURE — 99999 PR PBB SHADOW E&M-EST. PATIENT-LVL III: ICD-10-PCS | Mod: PBBFAC,,, | Performed by: FAMILY MEDICINE

## 2020-06-24 PROCEDURE — 3077F PR MOST RECENT SYSTOLIC BLOOD PRESSURE >= 140 MM HG: ICD-10-PCS | Mod: CPTII,S$GLB,, | Performed by: FAMILY MEDICINE

## 2020-06-24 PROCEDURE — 3008F PR BODY MASS INDEX (BMI) DOCUMENTED: ICD-10-PCS | Mod: CPTII,S$GLB,, | Performed by: FAMILY MEDICINE

## 2020-06-24 PROCEDURE — 81002 POCT URINE DIPSTICK WITHOUT MICROSCOPE: ICD-10-PCS | Mod: S$GLB,,, | Performed by: FAMILY MEDICINE

## 2020-06-24 PROCEDURE — 3008F BODY MASS INDEX DOCD: CPT | Mod: CPTII,S$GLB,, | Performed by: FAMILY MEDICINE

## 2020-06-24 PROCEDURE — 99999 PR PBB SHADOW E&M-EST. PATIENT-LVL III: CPT | Mod: PBBFAC,,, | Performed by: FAMILY MEDICINE

## 2020-06-24 PROCEDURE — 99214 PR OFFICE/OUTPT VISIT, EST, LEVL IV, 30-39 MIN: ICD-10-PCS | Mod: 25,S$GLB,, | Performed by: FAMILY MEDICINE

## 2020-06-24 PROCEDURE — 99214 OFFICE O/P EST MOD 30 MIN: CPT | Mod: 25,S$GLB,, | Performed by: FAMILY MEDICINE

## 2020-06-24 PROCEDURE — 3079F DIAST BP 80-89 MM HG: CPT | Mod: CPTII,S$GLB,, | Performed by: FAMILY MEDICINE

## 2020-06-24 PROCEDURE — 87086 URINE CULTURE/COLONY COUNT: CPT

## 2020-06-24 PROCEDURE — 3079F PR MOST RECENT DIASTOLIC BLOOD PRESSURE 80-89 MM HG: ICD-10-PCS | Mod: CPTII,S$GLB,, | Performed by: FAMILY MEDICINE

## 2020-06-24 PROCEDURE — 3077F SYST BP >= 140 MM HG: CPT | Mod: CPTII,S$GLB,, | Performed by: FAMILY MEDICINE

## 2020-06-24 PROCEDURE — 81002 URINALYSIS NONAUTO W/O SCOPE: CPT | Mod: S$GLB,,, | Performed by: FAMILY MEDICINE

## 2020-06-24 RX ORDER — NITROFURANTOIN 25; 75 MG/1; MG/1
CAPSULE ORAL
COMMUNITY
Start: 2020-05-22 | End: 2020-06-24 | Stop reason: ALTCHOICE

## 2020-06-24 RX ORDER — SULFAMETHOXAZOLE AND TRIMETHOPRIM 800; 160 MG/1; MG/1
1 TABLET ORAL 2 TIMES DAILY
Qty: 10 TABLET | Refills: 0 | Status: SHIPPED | OUTPATIENT
Start: 2020-06-24 | End: 2020-09-29

## 2020-06-24 NOTE — PROGRESS NOTES
"Subjective:       Patient ID: Brandie Richmond is a 59 y.o. female.    Chief Complaint: Flank Pain (discomfort, lt side comes and goes past couple of weeks, more frequent past 2 days) and Urinary Frequency (finished Macrobid 7 day rx on 06/01/2020)    HPI  Patient seen for UTI review.  Was treated for presumptive UTI following UA at [facility]. She notes some occ discomfort, but no overt sx. She completed a 7 day course of macrobid on 6/1/2020.   The last few days,she is having more dysuria and frequency. Today, noticing some lower back, L abdominal discomfort.  No hx of kidney stones.    Discussed elevated pressures. Not checking at home, but will track this week and let me know if persistently elevated.   Just completed prednisone taper for PMR and doing well.     Review of Systems:  Review of Systems   Constitutional: Negative for chills and fever.   Gastrointestinal: Negative for diarrhea and nausea.   Genitourinary: Positive for dysuria and frequency. Negative for difficulty urinating, hematuria and urgency.   Musculoskeletal: Negative for arthralgias and gait problem.       Objective:     Vitals:    06/24/20 0948 06/24/20 0954   BP: (!) 152/92 (!) 148/86   BP Location: Left arm    Patient Position: Sitting    BP Method: Large (Manual)    Pulse: 62    Temp: 98.1 °F (36.7 °C)    TempSrc: Oral    SpO2: 98%    Weight: 81.3 kg (179 lb 2 oz)    Height: 5' 6" (1.676 m)           Physical Exam  Vitals signs and nursing note reviewed.   Constitutional:       General: She is not in acute distress.     Appearance: She is well-developed.   HENT:      Head: Normocephalic and atraumatic.   Neck:      Musculoskeletal: Normal range of motion and neck supple.   Cardiovascular:      Rate and Rhythm: Normal rate and regular rhythm.   Pulmonary:      Effort: Pulmonary effort is normal. No respiratory distress.      Breath sounds: Normal breath sounds.   Abdominal:      General: There is no distension.      Tenderness: There is no " right CVA tenderness, left CVA tenderness or guarding.   Musculoskeletal: Normal range of motion.   Skin:     General: Skin is warm and dry.   Neurological:      General: No focal deficit present.      Mental Status: She is alert and oriented to person, place, and time.   Psychiatric:         Mood and Affect: Mood normal.           Assessment & Plan:  Acute cystitis without hematuria  Comments:  bactrim x 5 days pending cx, encouraged increased water intake  Orders:  -     POCT urine dipstick without microscope  -     Urine culture; Future    Essential hypertension  Comments:  uncontrolled, home log this week and let me know if persistently elevated for adj    PMR (polymyalgia rheumatica)  Comments:  currently off pred per rheum and doing well    Other orders  -     sulfamethoxazole-trimethoprim 800-160mg (BACTRIM DS) 800-160 mg Tab; Take 1 tablet by mouth 2 (two) times daily.  Dispense: 10 tablet; Refill: 0

## 2020-06-25 LAB — BACTERIA UR CULT: NORMAL

## 2020-07-16 ENCOUNTER — OFFICE VISIT (OUTPATIENT)
Dept: NEUROLOGY | Facility: CLINIC | Age: 60
End: 2020-07-16
Payer: COMMERCIAL

## 2020-07-16 VITALS
RESPIRATION RATE: 20 BRPM | DIASTOLIC BLOOD PRESSURE: 81 MMHG | BODY MASS INDEX: 28.56 KG/M2 | HEIGHT: 66 IN | WEIGHT: 177.69 LBS | HEART RATE: 71 BPM | SYSTOLIC BLOOD PRESSURE: 155 MMHG | TEMPERATURE: 99 F

## 2020-07-16 DIAGNOSIS — Z82.49 FAMILY HISTORY OF CARDIOVASCULAR DISEASE: Chronic | ICD-10-CM

## 2020-07-16 DIAGNOSIS — I10 ESSENTIAL HYPERTENSION: ICD-10-CM

## 2020-07-16 DIAGNOSIS — G43.719 INTRACTABLE CHRONIC MIGRAINE WITHOUT AURA AND WITHOUT STATUS MIGRAINOSUS: Primary | ICD-10-CM

## 2020-07-16 DIAGNOSIS — Z79.52 CURRENT CHRONIC USE OF SYSTEMIC STEROIDS: ICD-10-CM

## 2020-07-16 DIAGNOSIS — M35.3 PMR (POLYMYALGIA RHEUMATICA): ICD-10-CM

## 2020-07-16 PROCEDURE — 3079F PR MOST RECENT DIASTOLIC BLOOD PRESSURE 80-89 MM HG: ICD-10-PCS | Mod: CPTII,S$GLB,, | Performed by: PSYCHIATRY & NEUROLOGY

## 2020-07-16 PROCEDURE — 3008F BODY MASS INDEX DOCD: CPT | Mod: CPTII,S$GLB,, | Performed by: PSYCHIATRY & NEUROLOGY

## 2020-07-16 PROCEDURE — 99215 PR OFFICE/OUTPT VISIT, EST, LEVL V, 40-54 MIN: ICD-10-PCS | Mod: S$GLB,,, | Performed by: PSYCHIATRY & NEUROLOGY

## 2020-07-16 PROCEDURE — 3077F SYST BP >= 140 MM HG: CPT | Mod: CPTII,S$GLB,, | Performed by: PSYCHIATRY & NEUROLOGY

## 2020-07-16 PROCEDURE — 99215 OFFICE O/P EST HI 40 MIN: CPT | Mod: S$GLB,,, | Performed by: PSYCHIATRY & NEUROLOGY

## 2020-07-16 PROCEDURE — 3077F PR MOST RECENT SYSTOLIC BLOOD PRESSURE >= 140 MM HG: ICD-10-PCS | Mod: CPTII,S$GLB,, | Performed by: PSYCHIATRY & NEUROLOGY

## 2020-07-16 PROCEDURE — 99999 PR PBB SHADOW E&M-EST. PATIENT-LVL IV: ICD-10-PCS | Mod: PBBFAC,,, | Performed by: PSYCHIATRY & NEUROLOGY

## 2020-07-16 PROCEDURE — 3008F PR BODY MASS INDEX (BMI) DOCUMENTED: ICD-10-PCS | Mod: CPTII,S$GLB,, | Performed by: PSYCHIATRY & NEUROLOGY

## 2020-07-16 PROCEDURE — 99999 PR PBB SHADOW E&M-EST. PATIENT-LVL IV: CPT | Mod: PBBFAC,,, | Performed by: PSYCHIATRY & NEUROLOGY

## 2020-07-16 PROCEDURE — 3079F DIAST BP 80-89 MM HG: CPT | Mod: CPTII,S$GLB,, | Performed by: PSYCHIATRY & NEUROLOGY

## 2020-07-16 RX ORDER — BUTALBITAL, ACETAMINOPHEN AND CAFFEINE 50; 325; 40 MG/1; MG/1; MG/1
TABLET ORAL
Qty: 10 TABLET | Refills: 3 | Status: SHIPPED | OUTPATIENT
Start: 2020-07-16 | End: 2021-11-08 | Stop reason: SDUPTHER

## 2020-07-16 RX ORDER — SUMATRIPTAN 50 MG/1
TABLET, FILM COATED ORAL
Qty: 9 TABLET | Refills: 3 | Status: SHIPPED | OUTPATIENT
Start: 2020-07-16 | End: 2021-11-08

## 2020-07-16 NOTE — LETTER
July 16, 2020      Amie Dupree MD  [address]  [place] [address]           [facility]  [address] [place] [address]  Phone: [phone]  Fax: [phone]          Patient: Brandie Richmond   MR Number: [id number]   YOB: 1960   Date of Visit: 7/16/2020       Dear Dr. Amie Dupree:    Thank you for referring Brandie Richmond to me for evaluation. Attached you will find relevant portions of my assessment and plan of care.    If you have questions, please do not hesitate to call me. I look forward to following Brandie Richmond along with you.    Sincerely,    Nyla Young MD    Enclosure  CC:  No Recipients    If you would like to receive this communication electronically, please contact [email or web address] or [phone] to request more information on [employer] access.    For providers and/or their staff who would like to refer a patient to [facility], please contact us through our one-stop-shop provider referral line, [facility], at [phone].    If you feel you have received this communication in error or would no longer like to receive these types of communications, please e-mail [email or web address]

## 2020-07-16 NOTE — PROGRESS NOTES
Subjective:       Patient ID: Brandie Richmond is a 59 y.o. female.    Chief Complaint: Migraine (front and jaw area)    HPI  The patient is a pleasant 60 y/o female with PMHx of PMR, Hypertension presenting with chief complaint of headache. Onset several years ago. She saw the late Dr. Jimenez who diagnosed her with migraine and put her on Metoprolol for prevention and Fioricet for acute treatment. She did well but after his departure, she saw another neurologist, was displeased and had not specific treatment. Tylenol and rest for escalation partially relieve the pain.  In addition to multiple migraine attacks she has a daily monthly headache day. Last imaging in 2018 significant for minimal non specific white matter changes. Please see details of headache characteristics below.  Headache questionnaire    1. When did your Headaches start?    Several years ago      2. Where are your headaches located?   Frontal      3. Your headache's characteristics:   Excruciating, Pressure, Throbbing, Pounding, Stabbing, Like a tight band, Sharp      4. How long does the headache last?   hours      5. How often does the headache occur?   Daily with weekly escalations      6. Are your headaches preceded or accompanied by other symptoms? yes   If yes, please describe.  Sometimes have severe aura      7. Does the headache awaken you at night? no   If so, how often? N/A         8. Please dale the word that best describes your headache's intensity:    moderate      9. Using a scale of 1 through 10, with 0 = no pain and 10 = the worst pain:   What score is your headache now? 3   What score is your headache at its worst? 7   What score is your headache at its best? 3        10. Possible associated headache symptoms:  [x]  Sensitivity to light  [] Dizziness  [] Nasal or sinus pressure/ pain   [x] Sensitivity to noise  [] Vertigo  [x] Problems with concentration  [] Sensitivity to smells  [x] Ringing in ears  [] Problems with memory    [x]  Blurred vision  [x] Irritability  [x] Problems with task completion   [] Double vision  [] Anger  [x]  Problems with relaxation  [] Loss of appetite  [] Anxiety  [] Neck tightness, Neck pain  [] Nausea   [] Nasal congestion  [] Vomiting         11. Headache improving factors:  [x] Sleep  [] Heat  [] Darkness  [] Ice  [] Local pressure [] Menses (period)  [] Massage   [x] Medications:        12. Headache worsening factors:   [x] Fatigue [] Sneezing  [] Changes in Weather  [x] Light [] Bending Over [x] Stress  [x] Noise [] Ovulation  [] Multiple Sclerosis Flare-Up  [] Smells  [] Menses  [] Food   [] Coughing [x] Alcohol      13. Number of caffeinated drinks per day: 1/2       14. Number of diet drinks per day:  0      15. Have you seen any other [facility]ists within the last 3 years?  yes    Please Check any Medications or Procedures tried/failed for Headache    AED Neuromodulators  MAOIs  Ergot Alkaloids    Acetazolamide (Diamox) [] Phenelzine (Nardil) [] Dihydroergotamine (Migranal) []   Carbamazepine (Tegretol) [] Tranylcypromine (Parnate) [] Ergotamine (Ergomar) []   Gabapentin (Neurontin) [] Antihistamine/Serotonergic  Triptans    Lacosamide (Vimpat) [] Cyproheptadine (Periactin) [] Almotriptan (Axert) []   Lamotrigine (Lamictal) [] Antihypertensives  Eletriptan (Relpax) []   Levatiracetam (Keppra) [] Atenolol (Tenormin) [] Frovatriptan (Frova) []   Oxcarbazepine (Trileptal) [] Bisoprostol (Zebeta) [] Naratriptan (Amerge) []   Phenobarbital [] Candesartan (Atacand) [] Rizatriptan (Maxalt) []     Nebivolol (Bystolic)  Sumatriptan (Imitrex) []   Levetiracetam (Keppra)  Cardeilol (Coreg) [] Zolmitriptan (Zomig) []   Phenytoin (Dilantin) [] Diltiazem (Cardizem) [x]     Pregabalin (Lyrica) [] Lisinopril (Prinivil, Zestril) [] Combo Abortives    Primidone (Mysoline) [] Metoprolol (Toprol) [x] BC Powder []   Tiagabine (Gabatril) [] Nadolol (Corgard) [] Butalbital and Acetaminophen (Bupap) []   Topiramate  (Topamax)  (Trokendi) [] Nicardipine (Cardene) []     Vigabatrin (Sabril) [] Nimodipine (Nimotop) [] Butalbital, Acetaminophen, and caffeine (Fioricet) [x]   Valproic Acid (Depakote) (Divalproex Sodium) [] Propranolol (Inderal) []     Zonisamide (Zonegran) [] Telmisartan (Micardis) [] Butalbital, Aspirin, and caffeine (Fiorinal) []   Benzodiazepines  Timolol (Blocadren) []     Alprazolam (Xanax) [] Verapamil (Calan, Verelan) [] Butalbital, Caffeine, Acetaminophen, and Codeine (Fioricet with Codeine) []   Diazepam (Valium) [] NSAIDs      Lorazepam (Ativan) [] Acetaminophen (Tylenol) [x]     Clonazepam (Klonopin) [] Acetylsalicylic Acid (Aspirin) [] Butalbital, Caffeine, Aspirin, and Codeine  (Fiorinal with Codeine) []   Antidepressants  Diclofenac (Cambia) []     Amitriptyline (Elavil) [] Ibuprofen (Motrin) [x]     Desipramine (Norpramin) [] Indomethacin (Indocin) [] Aspirin, Caffeine, and Acetaminophen (Excedrin) (Goodys) []   Doxepin (Sinequan) [] Ketoprofen (Orudis) []     Fluoxetine (Prozac) [] Ketorolac (Toradol) [] Acetaminophen, Dichloralphenazone, and Isometheptene (Midrin) []   Imipramine (Tofranil) [] Naproxen (Anaprox) (Aleve) []     Nortriptyline (Pamelor) [] Meclofenamic Acid (Meclomen) []     Venlafaxine (Effexor) [] Meloxicam (Mobic) [x] Procedures    Desvenlafazine (Pristiq) [] Monoclonals  Greater occipital nerve block []   Duloxetine (Cymbalta) [] Eptinezumab [] Cervical, Thoracic, Lumbar radiofrequency ablation []   Trazadone [] Erenumab-aooe (Aimovig) [] Spenopalatine ganglion block []   Wellbutrin [] Galcanezumab (Emgality) [] Occipital neuro stimulation []   Protriptyline (Vivactil) [] Fremanazumab-vfrm (Ajovy)  Cervical, Thoracic, Lumbar, Caudal Epidural steroid injection []   Escitalopram (Lexapro) [] Other [] Sacroiliac joint steroid injection []   Celexa [] Memantine (Namenda) [] Transforaminal epidural steroid injection []     Botox [] Facet joint injections []     Baclofen (Lioresal) []  Cervical, Thoracic, Lumbar medial branch blocks []       Cefaly []       Gamma Core []       Iovera []       Transcranial Magnetic stimulation []                        Review of Systems   Constitutional: Positive for fatigue. Negative for activity change, appetite change and fever.   HENT: Negative for congestion, dental problem, hearing loss, sinus pressure, tinnitus, trouble swallowing and voice change.    Eyes: Negative for photophobia, pain, redness and visual disturbance.   Respiratory: Negative for cough, chest tightness and shortness of breath.    Cardiovascular: Negative for chest pain, palpitations and leg swelling.   Gastrointestinal: Negative for abdominal pain, blood in stool, nausea and vomiting.   Endocrine: Negative for cold intolerance and heat intolerance.   Genitourinary: Negative for difficulty urinating, frequency, menstrual problem and urgency.   Musculoskeletal: Positive for arthralgias. Negative for back pain, gait problem, joint swelling, myalgias, neck pain and neck stiffness.   Skin: Negative.    Neurological: Positive for headaches. Negative for dizziness, tremors, seizures, syncope, facial asymmetry, speech difficulty, weakness, light-headedness and numbness.   Hematological: Negative for adenopathy. Does not bruise/bleed easily.   Psychiatric/Behavioral: Negative for agitation, behavioral problems, confusion, decreased concentration, self-injury, sleep disturbance and suicidal ideas. The patient is not nervous/anxious and is not hyperactive.                Past Medical History:   Diagnosis Date    Abnormal Pap smear of cervix     ascus     Dizziness     DJD (degenerative joint disease) of cervical spine     DJD (degenerative joint disease) of cervical spine     Hemorrhoids     HTN (hypertension)     Hypothyroidism     Polyarthritis rheumatica      Past Surgical History:   Procedure Laterality Date    cervical fusion       SECTION      COLONOSCOPY       DEXA      2011, 2016    RHINOPLASTY TIP       Family History   Problem Relation Age of Onset    Heart failure Mother     Heart disease Mother     Osteoporosis Mother     Heart failure Father     Heart disease Father     Hypertension Brother     Diabetes Maternal Grandfather      Social History     Socioeconomic History    Marital status:      Spouse name: Not on file    Number of children: 1    Years of education: Not on file    Highest education level: Not on file   Occupational History     Employer: [employer]   Social Needs    Financial resource strain: Not on file    Food insecurity     Worry: Not on file     Inability: Not on file    Transportation needs     Medical: Not on file     Non-medical: Not on file   Tobacco Use    Smoking status: Never Smoker    Smokeless tobacco: Never Used   Substance and Sexual Activity    Alcohol use: Yes     Alcohol/week: 1.7 standard drinks     Types: 2 Standard drinks or equivalent per week     Comment: several times a week    Drug use: No    Sexual activity: Yes     Partners: Male     Birth control/protection: None   Lifestyle    Physical activity     Days per week: Not on file     Minutes per session: Not on file    Stress: Not on file   Relationships    Social connections     Talks on phone: Not on file     Gets together: Not on file     Attends Judaism service: Not on file     Active member of club or organization: Not on file     Attends meetings of clubs or organizations: Not on file     Relationship status: Not on file   Other Topics Concern    Not on file   Social History Narrative    Not on file     Review of patient's allergies indicates:   Allergen Reactions    Iodine      Other reaction(s): Rash       Current Outpatient Medications:     CALCIUM-MAGNESIUM-VITAMIN D2 ORAL, Take by mouth once daily., Disp: , Rfl:     cloNIDine (CATAPRES) 0.1 MG tablet, Take 1 tablet (0.1 mg total) by mouth 3 (three) times daily as  needed (PRN SBP > 165 mmHg)., Disp: 90 tablet, Rfl: 6    cyanocobalamin 1,000 mcg/mL injection, Inject 0.1 mLs (100 mcg total) into the muscle once daily., Disp: 10 mL, Rfl: 0    diltiaZEM (CARDIZEM CD) 180 MG 24 hr capsule, Take 1 capsule (180 mg total) by mouth once daily., Disp: 90 capsule, Rfl: 4    FLAXSEED OIL ORAL, Take by mouth., Disp: , Rfl:     folic acid (FOLVITE) 1 MG tablet, 1 TABLET BY MOUTH ONCE A DAY., Disp: , Rfl: 3    glucosamine/chondr pickett A sod (OSTEO BI-FLEX ORAL), Take by mouth., Disp: , Rfl:     magnesium 30 mg Tab, Take by mouth once., Disp: , Rfl:     melatonin 1 mg TbSR, Take by mouth., Disp: , Rfl:     methotrexate 2.5 MG Tab, 4 TABS BY MOUTH ONCE WEEKLY ALL AT THE SAME TIME., Disp: , Rfl: 3    multivitamin (THERAGRAN) per tablet, Take 1 tablet by mouth once daily., Disp: , Rfl:     omega-3 fatty acids/fish oil (FISH OIL-OMEGA-3 FATTY ACIDS) 300-1,000 mg capsule, Take by mouth once daily., Disp: , Rfl:     PENNSAID 20 mg/gram /actuation(2 %) sopm, APPLY TWO PUMPS TOPICALL;Y TO AFFECTED AREA (S) TWICE DAILY, Disp: , Rfl: 3    SYNTHROID 50 mcg tablet, TAKE 1 TABLET BY MOUTH EVERY MONDAY, TUESDAY, THURSDAY, FRIDAY, AND SUNDAY EVERY WEEK, Disp: 30 tablet, Rfl: 11    SYNTHROID 75 mcg tablet, TAKE 1 TABLET BY MOUTH TWICE WEEKLY, Disp: 30 tablet, Rfl: 9    TURMERIC ORAL, Take by mouth., Disp: , Rfl:     predniSONE (DELTASONE) 1 MG tablet, Take 2 mg by mouth once daily. , Disp: , Rfl:     sulfamethoxazole-trimethoprim 800-160mg (BACTRIM DS) 800-160 mg Tab, Take 1 tablet by mouth 2 (two) times daily., Disp: 10 tablet, Rfl: 0      Objective:      Vitals:    07/16/20 0807   BP: (!) 155/81   Pulse: 71   Resp: 20   Temp: 98.7 °F (37.1 °C)     Body mass index is 28.68 kg/m².    Physical Exam      Constitutional:   She appears well-developed and well-nourished. She is well groomed    HENT:    Head: Atraumatic, oral and nasal mucosa intact  Eyes: Conjunctivae and EOM are normal. Pupils are  equal, round, and reactive to light OU  Neck: Neck supple. No thyromegaly present  Cardiovascular: Normal rate and normal heart sounds  No murmur heard  Pulmonary/Chest: Effort normal and breath sounds normal  Musculoskeletal: Normal range of motion. No joint stiffness. No vertebral point tenderness  Skin: Skin is warm and dry  Psychiatric: Normal mood and affect     Neuro exam:    Mental status:  Awake, attentive, Alert, oriented to self, place, year and month  Language function is intact    Cranial Nerves:  Smell was not formally evaluated  Cranial Nerves II - XII: intact  Pursuits were smooth, normal saccades, no nystagmus OU  Funduscopic exam - disc were flat and pink, no exudates or hemorrhages OU  Motor - facial movement was symmetrical and normal     Palate moved well and was symmetrical with normal palatal and oral sensation  Tongue movements were full      Motor:  Normal muscle bulk and symmetry. No fasciculations were noted    No pronator drift  Strength 5/5 bilaterally     Reflexes:  Tendon reflexes were 2 + at biceps, triceps, brachioradialis, 3+ patellar, and Achilles bilaterally  On plantar stimulation toes were down going bilaterally  No clonus was noted     Sensory: Intact to light touch and temperature    Gait: Romberg absent. Normal gait.     Review of Data:  Lab Results   Component Value Date     11/07/2019    K 4.0 11/07/2019     11/07/2019    CO2 25 11/07/2019    BUN 17 11/07/2019    CREATININE 0.9 11/07/2019    GLU 90 11/07/2019    HGBA1C 5.1 06/05/2018    AST 16 04/20/2019    ALT 14 04/20/2019    ALBUMIN 3.7 11/07/2019    PROT 7.1 04/20/2019    BILITOT 0.4 04/20/2019    CHOL 221 (H) 04/20/2019    HDL 62 04/20/2019    LDLCALC 134.0 04/20/2019    TRIG 125 04/20/2019       Lab Results   Component Value Date    WBC 6.46 11/25/2019    HGB 13.5 11/25/2019    HCT 42.1 11/25/2019     (H) 11/25/2019     (H) 11/25/2019       Lab Results   Component Value Date    TSH 1.536  05/25/2019     Results for orders placed or performed during the hospital encounter of 08/22/18   MRI Brain W WO Contrast    Narrative    EXAMINATION:  MRI BRAIN W WO CONTRAST    CLINICAL HISTORY:  Headache, chronic, new features or neuro deficit;.  Other headache syndrome    TECHNIQUE:  Multiplanar multisequence MR imaging of the brain was performed before and after the administration of 8 mL Gadavist intravenous contrast.    COMPARISON:  Head CT dated 06/28/2016, brain MRI dated 10/21/2014    FINDINGS:  Intracranial compartment:    There is no acute intracranial abnormality.  Brain volume, ventricular size and position are normal.  There is no hemorrhage or mass/mass effect.  There are just a few scattered punctate foci of white matter T2 prolongation.  These findings likely reflect sequelae of minimal chronic microvascular ischemic disease and were present previously. There are no regions of restricted diffusion to suggest acute infarction.  There is no pathologic enhancement.  The basilar cisterns are open.  There is no abnormal extra-axial fluid collection.  Flow voids indicating patency are present in the major vessels at the base of the brain.  The cerebellar tonsils are just at the level of the foramen magnum in normal position.  The sellar structures are normal.  The orbits are grossly normal.  On whole brain imaging, the cerebellopontine angles and internal auditory canals are normal.    Skull/extracranial contents: Marrow signal intensity in the clivus and calvarium is grossly normal.  There is trace scattered mucosal thickening in the paranasal sinuses.  A small retention cyst is present along the anterior medial wall of the right maxillary sinus.  The mastoid air cells are clear.      Impression    1.  There is no acute abnormality.  There is no hemorrhage, mass/mass effect, acute infarction.  There is no pathologic enhancement.  There is minimal nonspecific white matter change.  The brain is  essentially normal for age.  Please see above discussion.      Electronically signed by: Sukhjinder Pandya MD  Date:    08/22/2018  Time:    09:23   Results for orders placed or performed during the hospital encounter of 06/28/16   CT Head Without Contrast    Narrative    CT brain without contrast.    Comparison: MRI brain 10/21/2014    Technique: Multiple 5 mm axial images of the head were obtained without intravenous contrast.      Findings: No evidence for acute intracranial hemorrhage or sulcal effacement. The ventricles are normal in size and configuration without evidence for hydrocephalus.  There is no midline shift or mass effect. The visualized paranasal sinuses and mastoid air cells are clear..    Impression     Unremarkable noncontrast CT head specifically without evidence for acute intracranial hemorrhage. Further evaluation as warranted clinically.  ______________________________________     Electronically signed by: SRI CUMMINGS DO  Date:     06/28/16  Time:    14:52    Results for orders placed or performed during the hospital encounter of 10/21/14   MRI Brain Without Contrast    Narrative    Procedure:  Multi-planar MR imaging of the brain was performed.  As requested, no IV contrast was administered.    Findings:  There are several punctate T2 hyperintense foci in the left cerebral basal ganglia region commensurate with the patient's age.  No significant abnormal signal abnormalities are evident within the brain parenchyma.  There are no findings to suggest large   vessel infarction, recent ischemia, neoplasm, or hemorrhage.  The ventricular system is normal.  The temporal bone structures, orbits, and paranasal sinuses demonstrate no abnormalities.    Impression    No significant intracranial abnormality.      Electronically signed by: Hung Ortega  Date:     10/21/14  Time:    11:06                Assessment and Plan     The patient has chronic migraines ( G43.719) and suffers from headaches  more than 15 days a month lasting more than 4 hours a day. Metoprolol and Cardizem ineffective. Topamax contraindicated because of acroparesthesias. Multiple co-morbidities including PMR on Prednisone and Methotrexate limit our options. The patient will be an ideal candidate for Botox. We are planning for 3 treatments 3 months apart and aiming for at least 50% improvement in the symptoms. If we see no improvement after 3 treatments, we will discontinue the injections.  For acute attacks start Sumatriptan 50 mg. Take 1 at onset of headache, may repeat in 2 hours to a max of 3 per day, 2 days per week  Fioricet for rescue, limit #10 per month  PMR, on Methotrexate and Prednisone  Hypertension followed by cardiology  I have discussed the side effects of the medications prescribed and the patient acknowledges understanding  Counseling:  More than 50% of the 45 minute encounter was spent face to face counseling the patient regarding current status and future plan of care as well as side of the medications. All questions were answered to patient's satisfaction          Misty Young M.D  Medical Director, Headache and Facial Pain  [facility]

## 2020-09-25 ENCOUNTER — PATIENT MESSAGE (OUTPATIENT)
Dept: DERMATOLOGY | Facility: CLINIC | Age: 60
End: 2020-09-25

## 2020-09-29 ENCOUNTER — OFFICE VISIT (OUTPATIENT)
Dept: DERMATOLOGY | Facility: CLINIC | Age: 60
End: 2020-09-29
Payer: COMMERCIAL

## 2020-09-29 VITALS — BODY MASS INDEX: 28.56 KG/M2 | HEIGHT: 66 IN | WEIGHT: 177.69 LBS | RESPIRATION RATE: 18 BRPM

## 2020-09-29 DIAGNOSIS — Z12.83 SKIN CANCER SCREENING: ICD-10-CM

## 2020-09-29 DIAGNOSIS — L82.1 SEBORRHEIC KERATOSES: ICD-10-CM

## 2020-09-29 DIAGNOSIS — D22.9 MULTIPLE BENIGN NEVI: ICD-10-CM

## 2020-09-29 DIAGNOSIS — L82.0 INFLAMED SEBORRHEIC KERATOSIS: Primary | ICD-10-CM

## 2020-09-29 DIAGNOSIS — L30.9 DERMATITIS: ICD-10-CM

## 2020-09-29 DIAGNOSIS — D18.01 ANGIOMA OF SKIN: ICD-10-CM

## 2020-09-29 DIAGNOSIS — L81.4 LENTIGO: ICD-10-CM

## 2020-09-29 PROCEDURE — 99999 PR PBB SHADOW E&M-EST. PATIENT-LVL II: ICD-10-PCS | Mod: PBBFAC,,, | Performed by: DERMATOLOGY

## 2020-09-29 PROCEDURE — 17110 DESTRUCTION B9 LES UP TO 14: CPT | Mod: S$GLB,,, | Performed by: DERMATOLOGY

## 2020-09-29 PROCEDURE — 99999 PR PBB SHADOW E&M-EST. PATIENT-LVL II: CPT | Mod: PBBFAC,,, | Performed by: DERMATOLOGY

## 2020-09-29 PROCEDURE — 3008F PR BODY MASS INDEX (BMI) DOCUMENTED: ICD-10-PCS | Mod: CPTII,S$GLB,, | Performed by: DERMATOLOGY

## 2020-09-29 PROCEDURE — 17110 PR DESTRUCTION BENIGN LESIONS UP TO 14: ICD-10-PCS | Mod: S$GLB,,, | Performed by: DERMATOLOGY

## 2020-09-29 PROCEDURE — 99214 OFFICE O/P EST MOD 30 MIN: CPT | Mod: 25,S$GLB,, | Performed by: DERMATOLOGY

## 2020-09-29 PROCEDURE — 99214 PR OFFICE/OUTPT VISIT, EST, LEVL IV, 30-39 MIN: ICD-10-PCS | Mod: 25,S$GLB,, | Performed by: DERMATOLOGY

## 2020-09-29 PROCEDURE — 3008F BODY MASS INDEX DOCD: CPT | Mod: CPTII,S$GLB,, | Performed by: DERMATOLOGY

## 2020-09-29 NOTE — PROGRESS NOTES
Subjective:       Patient ID:  Brandie Richmond is a 60 y.o. female who presents for   Chief Complaint   Patient presents with    Lesion     Patient present for skin check, last seen on 2019.     C/o lesion to R thigh x years. Pt states lesion is getting darker and raised. The patient denies any change, including increase in size, or spontaneous bleeding, associated with this lesion. Not treating    C/o rash to forehead x 1 week. Pt states rash is itchy, painful (last week), red, raised. Pt denies any new soaps, detergents, or medications. Pt had gone to the  and had hair . Not treating.     no Phx of NMSC.  Father had skin ca - type unknown   no Fhx of melanoma.    Past Medical History:  No date: Abnormal Pap smear of cervix      Comment:  ascus   No date: Dizziness  No date: DJD (degenerative joint disease) of cervical spine  No date: DJD (degenerative joint disease) of cervical spine  No date: Hemorrhoids  No date: HTN (hypertension)  No date: Hypothyroidism  2016: Polyarthritis rheumatica      Review of Systems   Constitutional: Negative for fever.   HENT: Negative for sore throat.    Respiratory: Negative for cough.    Gastrointestinal: Negative for nausea and vomiting.   Skin: Positive for itching, rash, activity-related sunscreen use and wears hat. Negative for dry skin and daily sunscreen use.   Hematologic/Lymphatic: Does not bruise/bleed easily.        Objective:    Physical Exam   Constitutional: She appears well-developed and well-nourished. No distress.   HENT:   Mouth/Throat: Lips normal.    Eyes: Lids are normal.  No conjunctival no injection.   Cardiovascular: There is no local extremity swelling and no dependent edema.     Neurological: She is alert and oriented to person, place, and time. She is not disoriented.   Psychiatric: She has a normal mood and affect.   Skin:   Areas Examined (abnormalities noted in diagram):   Scalp / Hair Palpated and Inspected  Head / Face  Inspection Performed  Neck Inspection Performed  Chest / Axilla Inspection Performed  Abdomen Inspection Performed  Back Inspection Performed  RUE Inspected  LUE Inspection Performed  RLE Inspected  LLE Inspection Performed  Nails and Digits Inspection Performed                   Diagram Legend     Erythematous scaling macule/papule c/w actinic keratosis       Vascular papule c/w angioma      Pigmented verrucoid papule/plaque c/w seborrheic keratosis      Yellow umbilicated papule c/w sebaceous hyperplasia      Irregularly shaped tan macule c/w lentigo     1-2 mm smooth white papules consistent with Milia      Movable subcutaneous cyst with punctum c/w epidermal inclusion cyst      Subcutaneous movable cyst c/w pilar cyst      Firm pink to brown papule c/w dermatofibroma      Pedunculated fleshy papule(s) c/w skin tag(s)      Evenly pigmented macule c/w junctional nevus     Mildly variegated pigmented, slightly irregular-bordered macule c/w mildly atypical nevus      Flesh colored to evenly pigmented papule c/w intradermal nevus       Pink pearly papule/plaque c/w basal cell carcinoma      Erythematous hyperkeratotic cursted plaque c/w SCC      Surgical scar with no sign of skin cancer recurrence      Open and closed comedones      Inflammatory papules and pustules      Verrucoid papule consistent consistent with wart     Erythematous eczematous patches and plaques     Dystrophic onycholytic nail with subungual debris c/w onychomycosis     Umbilicated papule    Erythematous-base heme-crusted tan verrucoid plaque consistent with inflamed seborrheic keratosis     Erythematous Silvery Scaling Plaque c/w Psoriasis     See annotation      Assessment / Plan:        Inflamed seborrheic keratosis vs other, right thigh  Cryosurgery procedure note:    Verbal consent from the patient is obtained. Liquid nitrogen cryosurgery is applied to 1 lesions to produce a freeze injury. The patient is aware that blisters may form and is  instructed on wound care with gentle cleansing and use of vaseline ointment to keep moist until healed. The patient is supplied a handout on cryosurgery and is instructed to call if lesions do not completely resolve. Risk of dyspigmentation discussed.       Skin cancer screening    Total body skin examination performed today including at least 12 points as noted in physical examination. No lesions suspicious for malignancy noted.      Seborrheic keratoses, trunk and extremities  These are benign inherited growths without a malignant potential. Reassurance given to patient. No treatment is necessary.       Multiple benign nevi  trunk and extremities  Discussed ABCDE's of nevi.  Monitor for new mole or moles that are becoming bigger, darker, irritated, or developing irregular borders.     Lentigo  Face  This is a benign hyperpigmented sun induced lesion. Daily sun protection will reduce the number of new lesions. Treatment of these benign lesions are considered cosmetic.      Angioma of skin  Chest  This is a benign vascular lesion. Reassurance given. No treatment required.     Dermatitis, left forehead  Discussed ddx: atypical rosacea, shingles, other  Pt states improving. No pain currently. Out of 72 hour window since initiation, declines antivirals  Will call if not resolving and will plan to biopsy. Declines today         No follow-ups on file.

## 2020-10-02 ENCOUNTER — TELEPHONE (OUTPATIENT)
Dept: DERMATOLOGY | Facility: CLINIC | Age: 60
End: 2020-10-02

## 2020-10-02 NOTE — TELEPHONE ENCOUNTER
----- Message from Sweetie Auguste sent at 10/2/2020  2:20 PM CDT -----  Regarding: PT  Contact: PT  PT called to get some more skin care products she bought in the office. Please call back     Callback: [phone]

## 2020-10-02 NOTE — TELEPHONE ENCOUNTER
Wants to  nectafirm and intellishade.  Will check with staff downstairs to be sure we have it and call her on Monday

## 2020-10-08 ENCOUNTER — TELEPHONE (OUTPATIENT)
Dept: FAMILY MEDICINE | Facility: CLINIC | Age: 60
End: 2020-10-08

## 2020-10-08 ENCOUNTER — PATIENT OUTREACH (OUTPATIENT)
Dept: ADMINISTRATIVE | Facility: HOSPITAL | Age: 60
End: 2020-10-08

## 2020-10-08 ENCOUNTER — PATIENT MESSAGE (OUTPATIENT)
Dept: ADMINISTRATIVE | Facility: HOSPITAL | Age: 60
End: 2020-10-08

## 2020-10-19 ENCOUNTER — OFFICE VISIT (OUTPATIENT)
Dept: NEUROLOGY | Facility: CLINIC | Age: 60
End: 2020-10-19
Payer: COMMERCIAL

## 2020-10-19 ENCOUNTER — PATIENT OUTREACH (OUTPATIENT)
Dept: ADMINISTRATIVE | Facility: OTHER | Age: 60
End: 2020-10-19

## 2020-10-19 VITALS
SYSTOLIC BLOOD PRESSURE: 148 MMHG | DIASTOLIC BLOOD PRESSURE: 78 MMHG | BODY MASS INDEX: 28.11 KG/M2 | RESPIRATION RATE: 20 BRPM | WEIGHT: 174.19 LBS | HEART RATE: 70 BPM

## 2020-10-19 DIAGNOSIS — M35.3 PMR (POLYMYALGIA RHEUMATICA): ICD-10-CM

## 2020-10-19 DIAGNOSIS — G43.719 INTRACTABLE CHRONIC MIGRAINE WITHOUT AURA AND WITHOUT STATUS MIGRAINOSUS: Primary | ICD-10-CM

## 2020-10-19 DIAGNOSIS — Z82.49 FAMILY HISTORY OF CARDIOVASCULAR DISEASE: ICD-10-CM

## 2020-10-19 DIAGNOSIS — I10 ESSENTIAL HYPERTENSION: ICD-10-CM

## 2020-10-19 PROCEDURE — 3077F PR MOST RECENT SYSTOLIC BLOOD PRESSURE >= 140 MM HG: ICD-10-PCS | Mod: CPTII,S$GLB,, | Performed by: PSYCHIATRY & NEUROLOGY

## 2020-10-19 PROCEDURE — 99999 PR PBB SHADOW E&M-EST. PATIENT-LVL IV: ICD-10-PCS | Mod: PBBFAC,,, | Performed by: PSYCHIATRY & NEUROLOGY

## 2020-10-19 PROCEDURE — 3008F PR BODY MASS INDEX (BMI) DOCUMENTED: ICD-10-PCS | Mod: CPTII,S$GLB,, | Performed by: PSYCHIATRY & NEUROLOGY

## 2020-10-19 PROCEDURE — 3008F BODY MASS INDEX DOCD: CPT | Mod: CPTII,S$GLB,, | Performed by: PSYCHIATRY & NEUROLOGY

## 2020-10-19 PROCEDURE — 99214 PR OFFICE/OUTPT VISIT, EST, LEVL IV, 30-39 MIN: ICD-10-PCS | Mod: S$GLB,,, | Performed by: PSYCHIATRY & NEUROLOGY

## 2020-10-19 PROCEDURE — 99214 OFFICE O/P EST MOD 30 MIN: CPT | Mod: S$GLB,,, | Performed by: PSYCHIATRY & NEUROLOGY

## 2020-10-19 PROCEDURE — 3077F SYST BP >= 140 MM HG: CPT | Mod: CPTII,S$GLB,, | Performed by: PSYCHIATRY & NEUROLOGY

## 2020-10-19 PROCEDURE — 3078F DIAST BP <80 MM HG: CPT | Mod: CPTII,S$GLB,, | Performed by: PSYCHIATRY & NEUROLOGY

## 2020-10-19 PROCEDURE — 3078F PR MOST RECENT DIASTOLIC BLOOD PRESSURE < 80 MM HG: ICD-10-PCS | Mod: CPTII,S$GLB,, | Performed by: PSYCHIATRY & NEUROLOGY

## 2020-10-19 PROCEDURE — 99999 PR PBB SHADOW E&M-EST. PATIENT-LVL IV: CPT | Mod: PBBFAC,,, | Performed by: PSYCHIATRY & NEUROLOGY

## 2020-10-19 NOTE — PROGRESS NOTES
Health Maintenance Due   Topic Date Due    HIV Screening  09/28/1975    Shingles Vaccine (1 of 2) 09/28/2009    Influenza Vaccine (1) 08/01/2020     Updates were requested from care everywhere.  Chart was reviewed for overdue Proactive [facility] Encounters ([facility]) topics (CRS, Breast Cancer Screening, Eye exam)  Health Maintenance has been updated.  LINKS immunization registry triggered.  Immunizations were reconciled.

## 2020-10-20 NOTE — PROGRESS NOTES
"Subjective:       Patient ID: Brandie Richmond is a 60 y.o. female.    Chief Complaint: Migraine (front and jaw area)    INTERVAL HISTORY 10/19/2020  The patient presents for follow up. She is doing "much better." The headaches have decreased to about one per month. Location varies, mostly frontal. She treats with Sumatriptan which is effective. She is on Methotrexate, just off Prednisone. Fioricet for rescue, not used, given sumatriptan efficacy. Otherwise information below is still accurate and current.    HPI  The patient is a pleasant 60 y/o female with PMHx of PMR, Hypertension presenting with chief complaint of headache. Onset several years ago. She saw the late Dr. Jimenez who diagnosed her with migraine and put her on Metoprolol for prevention and Fioricet for acute treatment. She did well but after his departure, she saw another neurologist, was displeased and had not specific treatment. Tylenol and rest for escalation partially relieve the pain.  In addition to multiple migraine attacks she has a daily monthly headache day. Last imaging in 2018 significant for minimal non specific white matter changes. Please see details of headache characteristics below.  Headache questionnaire    1. When did your Headaches start?    Several years ago      2. Where are your headaches located?   Frontal      3. Your headache's characteristics:   Excruciating, Pressure, Throbbing, Pounding, Stabbing, Like a tight band, Sharp      4. How long does the headache last?   hours      5. How often does the headache occur?   Daily with weekly escalations      6. Are your headaches preceded or accompanied by other symptoms? yes   If yes, please describe.  Sometimes have severe aura      7. Does the headache awaken you at night? no   If so, how often? N/A         8. Please dale the word that best describes your headache's intensity:    moderate      9. Using a scale of 1 through 10, with 0 = no pain and 10 = the worst pain:   What " score is your headache now? 3   What score is your headache at its worst? 7   What score is your headache at its best? 3        10. Possible associated headache symptoms:  [x]  Sensitivity to light  [] Dizziness  [] Nasal or sinus pressure/ pain   [x] Sensitivity to noise  [] Vertigo  [x] Problems with concentration  [] Sensitivity to smells  [x] Ringing in ears  [] Problems with memory    [x] Blurred vision  [x] Irritability  [x] Problems with task completion   [] Double vision  [] Anger  [x]  Problems with relaxation  [] Loss of appetite  [] Anxiety  [] Neck tightness, Neck pain  [] Nausea   [] Nasal congestion  [] Vomiting         11. Headache improving factors:  [x] Sleep  [] Heat  [] Darkness  [] Ice  [] Local pressure [] Menses (period)  [] Massage   [x] Medications:        12. Headache worsening factors:   [x] Fatigue [] Sneezing  [] Changes in Weather  [x] Light [] Bending Over [x] Stress  [x] Noise [] Ovulation  [] Multiple Sclerosis Flare-Up  [] Smells  [] Menses  [] Food   [] Coughing [x] Alcohol      13. Number of caffeinated drinks per day: 1/2       14. Number of diet drinks per day:  0      15. Have you seen any other [facility]ists within the last 3 years?  yes    Please Check any Medications or Procedures tried/failed for Headache    AED Neuromodulators  MAOIs  Ergot Alkaloids    Acetazolamide (Diamox) [] Phenelzine (Nardil) [] Dihydroergotamine (Migranal) []   Carbamazepine (Tegretol) [] Tranylcypromine (Parnate) [] Ergotamine (Ergomar) []   Gabapentin (Neurontin) [] Antihistamine/Serotonergic  Triptans    Lacosamide (Vimpat) [] Cyproheptadine (Periactin) [] Almotriptan (Axert) []   Lamotrigine (Lamictal) [] Antihypertensives  Eletriptan (Relpax) []   Levatiracetam (Keppra) [] Atenolol (Tenormin) [] Frovatriptan (Frova) []   Oxcarbazepine (Trileptal) [] Bisoprostol (Zebeta) [] Naratriptan (Amerge) []   Phenobarbital [] Candesartan (Atacand) [] Rizatriptan (Maxalt) []     Nebivolol (Bystolic)   Sumatriptan (Imitrex) []   Levetiracetam (Keppra)  Cardeilol (Coreg) [] Zolmitriptan (Zomig) []   Phenytoin (Dilantin) [] Diltiazem (Cardizem) [x]     Pregabalin (Lyrica) [] Lisinopril (Prinivil, Zestril) [] Combo Abortives    Primidone (Mysoline) [] Metoprolol (Toprol) [x] BC Powder []   Tiagabine (Gabatril) [] Nadolol (Corgard) [] Butalbital and Acetaminophen (Bupap) []   Topiramate (Topamax)  (Trokendi) [] Nicardipine (Cardene) []     Vigabatrin (Sabril) [] Nimodipine (Nimotop) [] Butalbital, Acetaminophen, and caffeine (Fioricet) [x]   Valproic Acid (Depakote) (Divalproex Sodium) [] Propranolol (Inderal) []     Zonisamide (Zonegran) [] Telmisartan (Micardis) [] Butalbital, Aspirin, and caffeine (Fiorinal) []   Benzodiazepines  Timolol (Blocadren) []     Alprazolam (Xanax) [] Verapamil (Calan, Verelan) [] Butalbital, Caffeine, Acetaminophen, and Codeine (Fioricet with Codeine) []   Diazepam (Valium) [] NSAIDs      Lorazepam (Ativan) [] Acetaminophen (Tylenol) [x]     Clonazepam (Klonopin) [] Acetylsalicylic Acid (Aspirin) [] Butalbital, Caffeine, Aspirin, and Codeine  (Fiorinal with Codeine) []   Antidepressants  Diclofenac (Cambia) []     Amitriptyline (Elavil) [] Ibuprofen (Motrin) [x]     Desipramine (Norpramin) [] Indomethacin (Indocin) [] Aspirin, Caffeine, and Acetaminophen (Excedrin) (Goodys) []   Doxepin (Sinequan) [] Ketoprofen (Orudis) []     Fluoxetine (Prozac) [] Ketorolac (Toradol) [] Acetaminophen, Dichloralphenazone, and Isometheptene (Midrin) []   Imipramine (Tofranil) [] Naproxen (Anaprox) (Aleve) []     Nortriptyline (Pamelor) [] Meclofenamic Acid (Meclomen) []     Venlafaxine (Effexor) [] Meloxicam (Mobic) [x] Procedures    Desvenlafazine (Pristiq) [] Monoclonals  Greater occipital nerve block []   Duloxetine (Cymbalta) [] Eptinezumab [] Cervical, Thoracic, Lumbar radiofrequency ablation []   Trazadone [] Erenumab-aooe (Aimovig) [] Spenopalatine ganglion block []   Wellbutrin [] Galcanezumab  (Emgality) [] Occipital neuro stimulation []   Protriptyline (Vivactil) [] Fremanazumab-vfrm (Ajovy)  Cervical, Thoracic, Lumbar, Caudal Epidural steroid injection []   Escitalopram (Lexapro) [] Other [] Sacroiliac joint steroid injection []   Celexa [] Memantine (Namenda) [] Transforaminal epidural steroid injection []     Botox [] Facet joint injections []     Baclofen (Lioresal) [] Cervical, Thoracic, Lumbar medial branch blocks []       Cefaly []       Gamma Core []       Iovera []       Transcranial Magnetic stimulation []                        Review of Systems   Constitutional: Positive for fatigue. Negative for activity change, appetite change and fever.   HENT: Negative for congestion, dental problem, hearing loss, sinus pressure, tinnitus, trouble swallowing and voice change.    Eyes: Negative for photophobia, pain, redness and visual disturbance.   Respiratory: Negative for cough, chest tightness and shortness of breath.    Cardiovascular: Negative for chest pain, palpitations and leg swelling.   Gastrointestinal: Negative for abdominal pain, blood in stool, nausea and vomiting.   Endocrine: Negative for cold intolerance and heat intolerance.   Genitourinary: Negative for difficulty urinating, frequency, menstrual problem and urgency.   Musculoskeletal: Positive for arthralgias. Negative for back pain, gait problem, joint swelling, myalgias, neck pain and neck stiffness.   Skin: Negative.    Neurological: Positive for headaches. Negative for dizziness, tremors, seizures, syncope, facial asymmetry, speech difficulty, weakness, light-headedness and numbness.   Hematological: Negative for adenopathy. Does not bruise/bleed easily.   Psychiatric/Behavioral: Negative for agitation, behavioral problems, confusion, decreased concentration, self-injury, sleep disturbance and suicidal ideas. The patient is not nervous/anxious and is not hyperactive.                Past Medical History:   Diagnosis Date     Abnormal Pap smear of cervix     ascus 2013    Dizziness     DJD (degenerative joint disease) of cervical spine     DJD (degenerative joint disease) of cervical spine     Hemorrhoids     HTN (hypertension)     Hypothyroidism     Polyarthritis rheumatica 2016     Past Surgical History:   Procedure Laterality Date    cervical fusion       SECTION      COLONOSCOPY  2011    DEXA      , 2016    RHINOPLASTY TIP       Family History   Problem Relation Age of Onset    Heart failure Mother     Heart disease Mother     Osteoporosis Mother     Heart failure Father     Heart disease Father     Hypertension Brother     Diabetes Maternal Grandfather      Social History     Socioeconomic History    Marital status:      Spouse name: Not on file    Number of children: 1    Years of education: Not on file    Highest education level: Not on file   Occupational History     Employer: [employer]   Social Needs    Financial resource strain: Not on file    Food insecurity     Worry: Not on file     Inability: Not on file    Transportation needs     Medical: Not on file     Non-medical: Not on file   Tobacco Use    Smoking status: Never Smoker    Smokeless tobacco: Never Used   Substance and Sexual Activity    Alcohol use: Yes     Alcohol/week: 1.7 standard drinks     Types: 2 Standard drinks or equivalent per week     Comment: several times a week    Drug use: No    Sexual activity: Yes     Partners: Male     Birth control/protection: None   Lifestyle    Physical activity     Days per week: Not on file     Minutes per session: Not on file    Stress: Not on file   Relationships    Social connections     Talks on phone: Not on file     Gets together: Not on file     Attends Gnosticism service: Not on file     Active member of club or organization: Not on file     Attends meetings of clubs or organizations: Not on file     Relationship status: Not on file   Other Topics  Concern    Not on file   Social History Narrative    Not on file     Review of patient's allergies indicates:   Allergen Reactions    Iodine      Other reaction(s): Rash       Current Outpatient Medications   Medication Sig    butalbital-acetaminophen-caffeine -40 mg (FIORICET, ESGIC) -40 mg per tablet 1 tab PO q6 hr PRN severe migraine. No more than 10 tab per 30 days.    CALCIUM-MAGNESIUM-VITAMIN D2 ORAL Take by mouth once daily.    cloNIDine (CATAPRES) 0.1 MG tablet Take 1 tablet (0.1 mg total) by mouth 3 (three) times daily as needed (PRN SBP > 165 mmHg).    cyanocobalamin 1,000 mcg/mL injection Inject 0.1 mLs (100 mcg total) into the muscle once daily.    diltiaZEM (CARDIZEM CD) 180 MG 24 hr capsule Take 1 capsule (180 mg total) by mouth once daily.    FLAXSEED OIL ORAL Take by mouth.    folic acid (FOLVITE) 1 MG tablet 1 TABLET BY MOUTH ONCE A DAY.    glucosamine/chondr pickett A sod (OSTEO BI-FLEX ORAL) Take by mouth.    magnesium 30 mg Tab Take by mouth once.    melatonin 1 mg TbSR Take by mouth.    methotrexate 2.5 MG Tab 4 TABS BY MOUTH ONCE WEEKLY ALL AT THE SAME TIME.    multivitamin (THERAGRAN) per tablet Take 1 tablet by mouth once daily.    omega-3 fatty acids/fish oil (FISH OIL-OMEGA-3 FATTY ACIDS) 300-1,000 mg capsule Take by mouth once daily.    PENNSAID 20 mg/gram /actuation(2 %) sopm APPLY TWO PUMPS TOPICALL;Y TO AFFECTED AREA (S) TWICE DAILY    sumatriptan (IMITREX) 50 MG tablet Take 1 at onset of headache, may repeat in 2 hours to a max of 3 per day, 2 days per week    SYNTHROID 50 mcg tablet TAKE 1 TABLET BY MOUTH EVERY MONDAY, TUESDAY, THURSDAY, FRIDAY, AND SUNDAY EVERY WEEK    SYNTHROID 75 mcg tablet TAKE 1 TABLET BY MOUTH TWICE WEEKLY    TURMERIC ORAL Take by mouth.    NITROFURANTOIN MONOHYD/M-CRYST ORAL      No current facility-administered medications for this visit.            Objective:      Vitals:    10/19/20 0829   BP: (!) 148/78   Pulse: 70   Resp: 20      Body mass index is 28.11 kg/m².    Physical Exam      Constitutional:   She appears well-developed and well-nourished. She is well groomed    Neurological Exam:  General: well-developed, well-nourished, no distress  Mental status: Awake and alert  Speech language: No dysarthria or aphasia on conversation  Cranial nerves: Face symmetric  Motor: Moves all extremities well  Coordination: No ataxia. No tremor.       Review of Data:  Lab Results   Component Value Date     11/07/2019    K 4.0 11/07/2019     11/07/2019    CO2 25 11/07/2019    BUN 17 11/07/2019    CREATININE 0.9 11/07/2019    GLU 90 11/07/2019    HGBA1C 5.1 06/05/2018    AST 16 04/20/2019    ALT 14 04/20/2019    ALBUMIN 3.7 11/07/2019    PROT 7.1 04/20/2019    BILITOT 0.4 04/20/2019    CHOL 221 (H) 04/20/2019    HDL 62 04/20/2019    LDLCALC 134.0 04/20/2019    TRIG 125 04/20/2019       Lab Results   Component Value Date    WBC 6.46 11/25/2019    HGB 13.5 11/25/2019    HCT 42.1 11/25/2019     (H) 11/25/2019     (H) 11/25/2019       Lab Results   Component Value Date    TSH 1.536 05/25/2019     Results for orders placed or performed during the hospital encounter of 08/22/18   MRI Brain W WO Contrast    Narrative    EXAMINATION:  MRI BRAIN W WO CONTRAST    CLINICAL HISTORY:  Headache, chronic, new features or neuro deficit;.  Other headache syndrome    TECHNIQUE:  Multiplanar multisequence MR imaging of the brain was performed before and after the administration of 8 mL Gadavist intravenous contrast.    COMPARISON:  Head CT dated 06/28/2016, brain MRI dated 10/21/2014    FINDINGS:  Intracranial compartment:    There is no acute intracranial abnormality.  Brain volume, ventricular size and position are normal.  There is no hemorrhage or mass/mass effect.  There are just a few scattered punctate foci of white matter T2 prolongation.  These findings likely reflect sequelae of minimal chronic microvascular ischemic disease and were  present previously. There are no regions of restricted diffusion to suggest acute infarction.  There is no pathologic enhancement.  The basilar cisterns are open.  There is no abnormal extra-axial fluid collection.  Flow voids indicating patency are present in the major vessels at the base of the brain.  The cerebellar tonsils are just at the level of the foramen magnum in normal position.  The sellar structures are normal.  The orbits are grossly normal.  On whole brain imaging, the cerebellopontine angles and internal auditory canals are normal.    Skull/extracranial contents: Marrow signal intensity in the clivus and calvarium is grossly normal.  There is trace scattered mucosal thickening in the paranasal sinuses.  A small retention cyst is present along the anterior medial wall of the right maxillary sinus.  The mastoid air cells are clear.      Impression    1.  There is no acute abnormality.  There is no hemorrhage, mass/mass effect, acute infarction.  There is no pathologic enhancement.  There is minimal nonspecific white matter change.  The brain is essentially normal for age.  Please see above discussion.      Electronically signed by: Sukhjinder Pandya MD  Date:    08/22/2018  Time:    09:23   Results for orders placed or performed during the hospital encounter of 06/28/16   CT Head Without Contrast    Narrative    CT brain without contrast.    Comparison: MRI brain 10/21/2014    Technique: Multiple 5 mm axial images of the head were obtained without intravenous contrast.      Findings: No evidence for acute intracranial hemorrhage or sulcal effacement. The ventricles are normal in size and configuration without evidence for hydrocephalus.  There is no midline shift or mass effect. The visualized paranasal sinuses and mastoid air cells are clear..    Impression     Unremarkable noncontrast CT head specifically without evidence for acute intracranial hemorrhage. Further evaluation as warranted  clinically.  ______________________________________     Electronically signed by: SRI CUMMINGS DO  Date:     06/28/16  Time:    14:52    Results for orders placed or performed during the hospital encounter of 10/21/14   MRI Brain Without Contrast    Narrative    Procedure:  Multi-planar MR imaging of the brain was performed.  As requested, no IV contrast was administered.    Findings:  There are several punctate T2 hyperintense foci in the left cerebral basal ganglia region commensurate with the patient's age.  No significant abnormal signal abnormalities are evident within the brain parenchyma.  There are no findings to suggest large   vessel infarction, recent ischemia, neoplasm, or hemorrhage.  The ventricular system is normal.  The temporal bone structures, orbits, and paranasal sinuses demonstrate no abnormalities.    Impression    No significant intracranial abnormality.      Electronically signed by: Hung Ortega  Date:     10/21/14  Time:    11:06                Assessment and Plan     The patient has chronic migraines ( G43.719) and suffers from headaches more than 15 days a month lasting more than 4 hours a day. Metoprolol and Cardizem ineffective. Topamax contraindicated because of acroparesthesias. Multiple co-morbidities including PMR on Methotrexate just off Prednisone, this limits our options.   For acute attacks continue Sumatriptan 50 mg. Take 1 at onset of headache, may repeat in 2 hours to a max of 3 per day, 2 days per week  Fioricet for rescue, limit #10 per month  PMR, on Methotrexate   Hypertension followed by cardiology  I have discussed the side effects of the medications prescribed and the patient acknowledges understanding  Counseling:  More than 50% of the 25 minute encounter was spent face to face counseling the patient regarding current status and future plan of care as well as side of the medications. All questions were answered to patient's satisfaction  .[facility]        Misty  HENRIQUE Young  Medical Director, Headache and Facial Pain  [facility]

## 2020-11-22 ENCOUNTER — OFFICE VISIT (OUTPATIENT)
Dept: URGENT CARE | Facility: CLINIC | Age: 60
End: 2020-11-22
Payer: COMMERCIAL

## 2020-11-22 VITALS
SYSTOLIC BLOOD PRESSURE: 163 MMHG | HEIGHT: 66 IN | HEART RATE: 66 BPM | TEMPERATURE: 98 F | WEIGHT: 174.19 LBS | RESPIRATION RATE: 18 BRPM | OXYGEN SATURATION: 98 % | BODY MASS INDEX: 27.99 KG/M2 | DIASTOLIC BLOOD PRESSURE: 77 MMHG

## 2020-11-22 DIAGNOSIS — M54.16 LUMBAR RADICULOPATHY: Primary | ICD-10-CM

## 2020-11-22 DIAGNOSIS — M79.605 LEFT LEG PAIN: ICD-10-CM

## 2020-11-22 PROCEDURE — 99214 OFFICE O/P EST MOD 30 MIN: CPT | Mod: 25,S$GLB,, | Performed by: PHYSICIAN ASSISTANT

## 2020-11-22 PROCEDURE — 96372 PR INJECTION,THERAP/PROPH/DIAG2ST, IM OR SUBCUT: ICD-10-PCS | Mod: S$GLB,,, | Performed by: PHYSICIAN ASSISTANT

## 2020-11-22 PROCEDURE — 3008F BODY MASS INDEX DOCD: CPT | Mod: CPTII,S$GLB,, | Performed by: PHYSICIAN ASSISTANT

## 2020-11-22 PROCEDURE — 3008F PR BODY MASS INDEX (BMI) DOCUMENTED: ICD-10-PCS | Mod: CPTII,S$GLB,, | Performed by: PHYSICIAN ASSISTANT

## 2020-11-22 PROCEDURE — 99214 PR OFFICE/OUTPT VISIT, EST, LEVL IV, 30-39 MIN: ICD-10-PCS | Mod: 25,S$GLB,, | Performed by: PHYSICIAN ASSISTANT

## 2020-11-22 PROCEDURE — 96372 THER/PROPH/DIAG INJ SC/IM: CPT | Mod: S$GLB,,, | Performed by: PHYSICIAN ASSISTANT

## 2020-11-22 RX ORDER — BETAMETHASONE SODIUM PHOSPHATE AND BETAMETHASONE ACETATE 3; 3 MG/ML; MG/ML
6 INJECTION, SUSPENSION INTRA-ARTICULAR; INTRALESIONAL; INTRAMUSCULAR; SOFT TISSUE
Status: COMPLETED | OUTPATIENT
Start: 2020-11-22 | End: 2020-11-22

## 2020-11-22 RX ORDER — KETOROLAC TROMETHAMINE 30 MG/ML
15 INJECTION, SOLUTION INTRAMUSCULAR; INTRAVENOUS
Status: COMPLETED | OUTPATIENT
Start: 2020-11-22 | End: 2020-11-22

## 2020-11-22 RX ADMIN — BETAMETHASONE SODIUM PHOSPHATE AND BETAMETHASONE ACETATE 6 MG: 3; 3 INJECTION, SUSPENSION INTRA-ARTICULAR; INTRALESIONAL; INTRAMUSCULAR; SOFT TISSUE at 10:11

## 2020-11-22 RX ADMIN — KETOROLAC TROMETHAMINE 15 MG: 30 INJECTION, SOLUTION INTRAMUSCULAR; INTRAVENOUS at 11:11

## 2020-11-22 NOTE — PROGRESS NOTES
"Subjective:       Patient ID: Brandie Richmond is a 60 y.o. female.    Vitals:  height is 5' 6" (1.676 m) and weight is 79 kg (174 lb 2.6 oz). Her temperature is 98.1 °F (36.7 °C). Her blood pressure is 163/77 (abnormal) and her pulse is 66. Her respiration is 18 and oxygen saturation is 98%.     Chief Complaint: Hip Injury (Right)    Pt presents to the clinic with R hip pain. Pt states that she does not believe she hurt her hip. Pt states that it just started hurting, this morning. Pt stated that she was having hip pain on the L in August and was given a sanjuanita shot by rheum. Pt stated that this time it started in her lower back and moved to R hip. Pt states that she has shooting pains and the middle toes on L foot seem to go numb.     Hip Injury  This is a new problem. The current episode started in the past 7 days. The problem occurs constantly. The problem has been gradually worsening. Pertinent negatives include no arthralgias, chest pain, chills, congestion, coughing, fatigue, fever, headaches, joint swelling, myalgias, nausea, rash, sore throat, vertigo, vomiting or weakness. The symptoms are aggravated by twisting, walking, standing and bending. She has tried acetaminophen and heat for the symptoms. The treatment provided mild relief.       Constitution: Negative for chills, fatigue and fever.   HENT: Negative for congestion and sore throat.    Neck: Negative for painful lymph nodes.   Cardiovascular: Negative for chest pain and leg swelling.   Eyes: Negative for double vision and blurred vision.   Respiratory: Negative for cough and shortness of breath.    Gastrointestinal: Negative for nausea, vomiting and diarrhea.   Genitourinary: Negative for dysuria, frequency, urgency and history of kidney stones.   Musculoskeletal: Positive for pain. Negative for joint pain, joint swelling, muscle cramps and muscle ache.   Skin: Negative for color change, pale, rash and bruising.   Allergic/Immunologic: Negative for " seasonal allergies.   Neurological: Negative for dizziness, history of vertigo, light-headedness, passing out and headaches.   Hematologic/Lymphatic: Negative for swollen lymph nodes.   Psychiatric/Behavioral: Negative for nervous/anxious, sleep disturbance and depression. The patient is not nervous/anxious.        Objective:      Physical Exam   Musculoskeletal:      Right hip: She exhibits decreased range of motion (decreased ER R>L hip). She exhibits normal strength, no tenderness, no bony tenderness, no swelling, no crepitus, no deformity and no laceration.        Legs:          Assessment:       1. Lumbar radiculopathy    2. Left leg pain        Plan:         Lumbar radiculopathy  -     ketorolac injection 15 mg  -     betamethasone acetate-betamethasone sodium phosphate injection 6 mg    Left leg pain  -     ketorolac injection 15 mg  -     betamethasone acetate-betamethasone sodium phosphate injection 6 mg    All applicable EKG, medical records, labs, imaging reviewed in Epic and discussed with patient.   Lumbar MRI and x-ray reviewed from 2019 suggests H and P at multiple lumbar levels with arthritis.  Patient was receiving treatment for her back last year, but has not received recent treatment.  We will start with medications as above, but if her symptoms persist, I insist she follow-up with spine for additional evaluation and management.  Patient Instructions       Understanding Lumbar Radiculopathy    Lumbar radiculopathy is irritation or inflammation of a nerve root in the low back. It causes symptoms that spread out from the back down one or both legs. To understand this condition, it helps to understand the parts of the spine:  · Vertebrae. These are bones that stack to form the spine. The lumbar spine contains the 5 bottom vertebrae.  · Disks. These are soft pads of tissue between the vertebrae. They act as shock absorbers for the spine.  · Spinal canal. This is a tunnel formed within the stacked  vertebrae. In the lumbar spine, nerves run through this canal.  · Nerves. These branch off and leave the spinal canal, traveling out to parts of the body. As they leave the spinal canal, nerves pass through openings between the vertebrae. The nerve root is the part of the nerve that is closest to the spinal canal.  · Sciatic nerve. This is a large nerve formed from several nerve roots in the low back. This nerve extends down the back of the leg to the foot.  With lumbar radiculopathy, nerve roots in the low back become irritated. This leads to pain and symptoms. The sciatic nerve is commonly involved, so the condition is often called sciatica.  What causes lumbar radiculopathy?  Aging, injury, poor posture, extra body weight, and other issues can lead to problems in the low back. These problems may then irritate nerve roots. They include:  · Damage to a disk in the lumbar spine. The damaged disk may then press on nearby nerve roots.  · Degeneration from wear and tear, and aging. This can lead to narrowing (stenosis) of the openings between the vertebrae. The narrowed openings press on nerve roots as they leave the spinal canal.  · Unstable spine. This is when a vertebra slips forward. It can then press on a nerve root.  Other, less common things can put pressure on nerves in the low back. These include diabetes, infection, or a tumor.  Symptoms of lumbar radiculopathy  These include:  · Pain in the low back  · Pain, numbness, tingling, or weakness that travels into the buttocks, hip, groin, or leg  · Muscle spasms  Treatment for lumbar radiculopathy  In most cases, your healthcare provider will first try treatments that help relieve symptoms. These may include:  · Prescription and over-the-counter pain medicines. These help relieve pain, swelling, and irritation.  · Limits on positions and activities that increase pain. But lying in bed or avoiding all movement is only recommended for a short period of  time.  · Physical therapy, including exercises and stretches. This helps decrease pain and increase movement and function.  · Steroid shots into the lower back. This may help relieve symptoms for a time.  · Weight-loss program. If you are overweight, losing extra pounds may help relieve symptoms.  In some cases, you may need surgery to fix the underlying problem. This depends on the cause, the symptoms, and how long the pain has lasted.  Possible complications  Over time, an irritated and inflamed nerve may become damaged. This may lead to long-lasting (permanent) numbness or weakness in your legs and feet. If symptoms change suddenly or get worse, be sure to let your healthcare provider know.  When to call your healthcare provider  Call your healthcare provider right away if you have any of these:  · New pain or pain that gets worse  · New or increasing weakness, tingling, or numbness in your leg or foot  · Problems controlling your bladder or bowel   Date Last Reviewed: 3/10/2016  © 5562-7097 [employer]. [address] [place] [address]. All rights reserved. This information is not intended as a substitute for professional medical care. Always follow your healthcare professional's instructions.      Back Care Tips    Caring for your back  These are things you can do to prevent a recurrence of acute back pain and to reduce symptoms from chronic back pain:  · Maintain a healthy weight. If you are overweight, losing weight will help most types of back pain.  · Exercise is an important part of recovery from most types of back pain. The muscles behind and in front of the spine support the back. This means strengthening both the back muscles and the abdominal muscles will provide better support for your spine.   · Swimming and brisk walking are good overall exercises to improve your fitness level.  · Practice safe lifting methods (below).  · Practice good posture when sitting, standing and  walking. Avoid prolonged sitting. This puts more stress on the lower back than standing or walking.  · Wear quality shoes with sufficient arch support. Foot and ankle alignment can affect back symptoms. Women should avoid wearing high heels.  · Therapeutic massage can help relax the back muscles without stretching them.  · During the first 24 to 72 hours after an acute injury or flare-up of chronic back pain, apply an ice pack to the painful area for 20 minutes and then remove it for 20 minutes, over a period of 60 to 90 minutes, or several times a day. As a safety precaution, do not use a heating pad at bedtime. Sleeping on a heating pad can lead to skin burns or tissue damage.  · You can alternate ice and heat therapies.  Medications  Talk to your healthcare provider before using medicines, especially if you have other medical problems or are taking other medicines.  · You may use acetaminophen or ibuprofen to control pain, unless your healthcare provider prescribed other pain medicine. If you have chronic conditions like diabetes, liver or kidney disease, stomach ulcers, or gastrointestinal bleeding, or are taking blood thinners, talk with your healthcare provider before taking any medicines.  · Be careful if you are given prescription pain medicines, narcotics, or medicine for muscle spasm. They can cause drowsiness, affect your coordination, reflexes, and judgment. Do not drive or operate heavy machinery while taking these types of medicines. Take prescription pain medicine only as prescribed by your healthcare provider.  Lumbar stretch  Here is a simple stretching exercise that will help relax muscle spasm and keep your back more limber. If exercise makes your back pain worse, dont do it.  · Lie on your back with your knees bent and both feet on the ground.  · Slowly raise your left knee to your chest as you flatten your lower back against the floor. Hold for 5 seconds.  · Relax and repeat the exercise with  your right knee.  · Do 10 of these exercises for each leg.  Safe lifting method  · Dont bend over at the waist to lift an object off the floor.  Instead, bend your knees and hips in a squat.   · Keep your back and head upright  · Hold the object close to your body, directly in front of you.  · Straighten your legs to lift the object.   · Lower the object to the floor in the reverse fashion.  · If you must slide something across the floor, push it.  Posture tips  Sitting  Sit in chairs with straight backs or low-back support. Keep your knees lower than your hips, with your feet flat on the floor.  When driving, sit up straight. Adjust the seat forward so you are not leaning toward the steering wheel.  A small pillow or rolled towel behind your lower back may help if you are driving long distances.   Standing  When standing for long periods, shift most of your weight to one leg at a time. Alternate legs every few minutes.   Sleeping  The best way to sleep is on your side with your knees bent. Put a low pillow under your head to support your neck in a neutral spine position. Avoid thick pillows that bend your neck to one side. Put a pillow between your legs to further relax your lower back. If you sleep on your back, put pillows under your knees to support your legs in a slightly flexed position. Use a firm mattress. If your mattress sags, replace it, or use a 1/2-inch plywood board under the mattress to add support.  Follow-up care  Follow up with your healthcare provider, or as advised.  If X-rays, a CT scan or an MRI scan were taken, they will be reviewed by a radiologist. You will be notified of any new findings that may affect your care.  Call 911  Seek emergency medical care if any of the following occur:  · Trouble breathing  · Confusion  · Very drowsy  · Fainting or loss of consciousness  · Rapid or very slow heart rate  · Loss of  bowel or bladder control  When to seek medical care  Call your healthcare  provider if any of the following occur:  · Pain becomes worse or spreads to your arms or legs  · Weakness or numbness in one or both arms or legs  · Numbness in the groin area  Date Last Reviewed: 6/1/2016 © 2000-2017 [employer]. [address] [place] [address]. All rights reserved. This information is not intended as a substitute for professional medical care. Always follow your healthcare professional's instructions.    You received a steroid shot today - this can elevate your blood pressure, elevate your blood sugar, water weight gain, nervous energy, redness to the face and dimpling of the skin where the shot goes in.   Thank you for enrolling in [facility]. Please follow the instructions below to securely access your online medical record. [employer] allows you to send messages to your doctor, view your test results, renew your prescriptions, schedule appointments, and more.     How Do I Sign Up?  1. In your Internet browser, go to [email or web address].  2. In the lower right of the page, click the Activate Now link located under the Have Access Code? Title.  3. Enter your [facility] Access Code exactly as it appears below. You will not need to use this code after youve completed the sign-up process. If you do not sign up before the expiration date, you must request a new code.  [facility] Access Code: Activation code not generated  Current Patient Portal Status: Active    4. Enter Date of Birth (mm/dd/yyyy) as indicated and click the Next button. You will be taken to the next sign-up page.  5. Create a [facility] ID. This will be your new [facility] login ID and cannot be changed, so think of one that is secure and easy to remember.  6. Create a [facility] password.  Your password must be at least 8 characters long and contain at least 1 letter and 1 number.  You can change your password at any time.  7. Enter your Password Reset Question and Answer, then click the Next button.   8. Enter your  e-mail address. You will receive e-mail notification when new information is available in [facility].  9. Click Sign Up. You can now view your medical record.     Additional Information  If you have questions, you can email [email or web address] or call [phone]  to talk to our [facility] staff. Remember, [facility] is NOT to be used for urgent needs. For medical emergencies, dial 911.     If not allergic,take tylenol (acetominophen) for fever control, chills, or body aches every 4 hours. Do not exceed 4000 mg/ day.If not allergic, take Motrin (Ibuprofen) every 4 hours for fever, chills, pain or inflammation. Do not exceed 2400 mg/day. You can alternate taking tylenol and motrin.    If you were prescribed a narcotic medication, do not drive or operate heavy equipment or machinery while taking these medications.  You must understand that you've received an Urgent Care treatment only and that you may be released before all your medical problems are known or treated. You, the patient, will arrange for follow up care as instructed.  Follow up with your PCP or specialty clinic as directed in the next 1-2 weeks if not improved or as needed.  You can call [phone] to schedule an appointment with the appropriate provider.  If your condition worsens we recommend that you receive another evaluation at the emergency room immediately or contact your primary medical clinics after hours call service to discuss your concerns.    Please return here or go to the Emergency Department for any concerns or worsening of condition.    If you have been referred to another provider and wish to call to check on the status of your referral, please call [facility] at [phone]

## 2020-11-22 NOTE — PATIENT INSTRUCTIONS
Understanding Lumbar Radiculopathy    Lumbar radiculopathy is irritation or inflammation of a nerve root in the low back. It causes symptoms that spread out from the back down one or both legs. To understand this condition, it helps to understand the parts of the spine:  · Vertebrae. These are bones that stack to form the spine. The lumbar spine contains the 5 bottom vertebrae.  · Disks. These are soft pads of tissue between the vertebrae. They act as shock absorbers for the spine.  · Spinal canal. This is a tunnel formed within the stacked vertebrae. In the lumbar spine, nerves run through this canal.  · Nerves. These branch off and leave the spinal canal, traveling out to parts of the body. As they leave the spinal canal, nerves pass through openings between the vertebrae. The nerve root is the part of the nerve that is closest to the spinal canal.  · Sciatic nerve. This is a large nerve formed from several nerve roots in the low back. This nerve extends down the back of the leg to the foot.  With lumbar radiculopathy, nerve roots in the low back become irritated. This leads to pain and symptoms. The sciatic nerve is commonly involved, so the condition is often called sciatica.  What causes lumbar radiculopathy?  Aging, injury, poor posture, extra body weight, and other issues can lead to problems in the low back. These problems may then irritate nerve roots. They include:  · Damage to a disk in the lumbar spine. The damaged disk may then press on nearby nerve roots.  · Degeneration from wear and tear, and aging. This can lead to narrowing (stenosis) of the openings between the vertebrae. The narrowed openings press on nerve roots as they leave the spinal canal.  · Unstable spine. This is when a vertebra slips forward. It can then press on a nerve root.  Other, less common things can put pressure on nerves in the low back. These include diabetes, infection, or a tumor.  Symptoms of lumbar radiculopathy  These  include:  · Pain in the low back  · Pain, numbness, tingling, or weakness that travels into the buttocks, hip, groin, or leg  · Muscle spasms  Treatment for lumbar radiculopathy  In most cases, your healthcare provider will first try treatments that help relieve symptoms. These may include:  · Prescription and over-the-counter pain medicines. These help relieve pain, swelling, and irritation.  · Limits on positions and activities that increase pain. But lying in bed or avoiding all movement is only recommended for a short period of time.  · Physical therapy, including exercises and stretches. This helps decrease pain and increase movement and function.  · Steroid shots into the lower back. This may help relieve symptoms for a time.  · Weight-loss program. If you are overweight, losing extra pounds may help relieve symptoms.  In some cases, you may need surgery to fix the underlying problem. This depends on the cause, the symptoms, and how long the pain has lasted.  Possible complications  Over time, an irritated and inflamed nerve may become damaged. This may lead to long-lasting (permanent) numbness or weakness in your legs and feet. If symptoms change suddenly or get worse, be sure to let your healthcare provider know.  When to call your healthcare provider  Call your healthcare provider right away if you have any of these:  · New pain or pain that gets worse  · New or increasing weakness, tingling, or numbness in your leg or foot  · Problems controlling your bladder or bowel   Date Last Reviewed: 3/10/2016  © 6544-9158 [employer]. [address] [place] [address], [place] [address]. All rights reserved. This information is not intended as a substitute for professional medical care. Always follow your healthcare professional's instructions.      Back Care Tips    Caring for your back  These are things you can do to prevent a recurrence of acute back pain and to reduce symptoms from chronic back  pain:  · Maintain a healthy weight. If you are overweight, losing weight will help most types of back pain.  · Exercise is an important part of recovery from most types of back pain. The muscles behind and in front of the spine support the back. This means strengthening both the back muscles and the abdominal muscles will provide better support for your spine.   · Swimming and brisk walking are good overall exercises to improve your fitness level.  · Practice safe lifting methods (below).  · Practice good posture when sitting, standing and walking. Avoid prolonged sitting. This puts more stress on the lower back than standing or walking.  · Wear quality shoes with sufficient arch support. Foot and ankle alignment can affect back symptoms. Women should avoid wearing high heels.  · Therapeutic massage can help relax the back muscles without stretching them.  · During the first 24 to 72 hours after an acute injury or flare-up of chronic back pain, apply an ice pack to the painful area for 20 minutes and then remove it for 20 minutes, over a period of 60 to 90 minutes, or several times a day. As a safety precaution, do not use a heating pad at bedtime. Sleeping on a heating pad can lead to skin burns or tissue damage.  · You can alternate ice and heat therapies.  Medications  Talk to your healthcare provider before using medicines, especially if you have other medical problems or are taking other medicines.  · You may use acetaminophen or ibuprofen to control pain, unless your healthcare provider prescribed other pain medicine. If you have chronic conditions like diabetes, liver or kidney disease, stomach ulcers, or gastrointestinal bleeding, or are taking blood thinners, talk with your healthcare provider before taking any medicines.  · Be careful if you are given prescription pain medicines, narcotics, or medicine for muscle spasm. They can cause drowsiness, affect your coordination, reflexes, and judgment. Do not  drive or operate heavy machinery while taking these types of medicines. Take prescription pain medicine only as prescribed by your healthcare provider.  Lumbar stretch  Here is a simple stretching exercise that will help relax muscle spasm and keep your back more limber. If exercise makes your back pain worse, dont do it.  · Lie on your back with your knees bent and both feet on the ground.  · Slowly raise your left knee to your chest as you flatten your lower back against the floor. Hold for 5 seconds.  · Relax and repeat the exercise with your right knee.  · Do 10 of these exercises for each leg.  Safe lifting method  · Dont bend over at the waist to lift an object off the floor.  Instead, bend your knees and hips in a squat.   · Keep your back and head upright  · Hold the object close to your body, directly in front of you.  · Straighten your legs to lift the object.   · Lower the object to the floor in the reverse fashion.  · If you must slide something across the floor, push it.  Posture tips  Sitting  Sit in chairs with straight backs or low-back support. Keep your knees lower than your hips, with your feet flat on the floor.  When driving, sit up straight. Adjust the seat forward so you are not leaning toward the steering wheel.  A small pillow or rolled towel behind your lower back may help if you are driving long distances.   Standing  When standing for long periods, shift most of your weight to one leg at a time. Alternate legs every few minutes.   Sleeping  The best way to sleep is on your side with your knees bent. Put a low pillow under your head to support your neck in a neutral spine position. Avoid thick pillows that bend your neck to one side. Put a pillow between your legs to further relax your lower back. If you sleep on your back, put pillows under your knees to support your legs in a slightly flexed position. Use a firm mattress. If your mattress sags, replace it, or use a 1/2-inch plywood  board under the mattress to add support.  Follow-up care  Follow up with your healthcare provider, or as advised.  If X-rays, a CT scan or an MRI scan were taken, they will be reviewed by a radiologist. You will be notified of any new findings that may affect your care.  Call 911  Seek emergency medical care if any of the following occur:  · Trouble breathing  · Confusion  · Very drowsy  · Fainting or loss of consciousness  · Rapid or very slow heart rate  · Loss of  bowel or bladder control  When to seek medical care  Call your healthcare provider if any of the following occur:  · Pain becomes worse or spreads to your arms or legs  · Weakness or numbness in one or both arms or legs  · Numbness in the groin area  Date Last Reviewed: 6/1/2016 © 2000-2017 [employer]. [address] [place] [address]. All rights reserved. This information is not intended as a substitute for professional medical care. Always follow your healthcare professional's instructions.    You received a steroid shot today - this can elevate your blood pressure, elevate your blood sugar, water weight gain, nervous energy, redness to the face and dimpling of the skin where the shot goes in.   Thank you for enrolling in [facility]. Please follow the instructions below to securely access your online medical record. My allows you to send messages to your doctor, view your test results, renew your prescriptions, schedule appointments, and more.     How Do I Sign Up?  1. In your Internet browser, go to [email or web address].  2. In the lower right of the page, click the Activate Now link located under the Have Access Code? Title.  3. Enter your [facility] Access Code exactly as it appears below. You will not need to use this code after youve completed the sign-up process. If you do not sign up before the expiration date, you must request a new code.  [facility] Access Code: Activation code not generated  Current Patient Portal  Status: Active    4. Enter Date of Birth (mm/dd/yyyy) as indicated and click the Next button. You will be taken to the next sign-up page.  5. Create a [facility] ID. This will be your new [facility] login ID and cannot be changed, so think of one that is secure and easy to remember.  6. Create a [facility] password.  Your password must be at least 8 characters long and contain at least 1 letter and 1 number.  You can change your password at any time.  7. Enter your Password Reset Question and Answer, then click the Next button.   8. Enter your e-mail address. You will receive e-mail notification when new information is available in [facility].  9. Click Sign Up. You can now view your medical record.     Additional Information  If you have questions, you can email [email or web address] or call [phone]  to talk to our [facility] staff. Remember, [facility] is NOT to be used for urgent needs. For medical emergencies, dial 911.     If not allergic,take tylenol (acetominophen) for fever control, chills, or body aches every 4 hours. Do not exceed 4000 mg/ day.If not allergic, take Motrin (Ibuprofen) every 4 hours for fever, chills, pain or inflammation. Do not exceed 2400 mg/day. You can alternate taking tylenol and motrin.    If you were prescribed a narcotic medication, do not drive or operate heavy equipment or machinery while taking these medications.  You must understand that you've received an Urgent Care treatment only and that you may be released before all your medical problems are known or treated. You, the patient, will arrange for follow up care as instructed.  Follow up with your PCP or specialty clinic as directed in the next 1-2 weeks if not improved or as needed.  You can call [phone] to schedule an appointment with the appropriate provider.  If your condition worsens we recommend that you receive another evaluation at the emergency room immediately or contact your primary medical clinics after hours  call service to discuss your concerns.    Please return here or go to the Emergency Department for any concerns or worsening of condition.    If you have been referred to another provider and wish to call to check on the status of your referral, please call [facility] at [phone]

## 2021-01-05 ENCOUNTER — TELEPHONE (OUTPATIENT)
Dept: NEPHROLOGY | Facility: CLINIC | Age: 61
End: 2021-01-05

## 2021-01-13 ENCOUNTER — OFFICE VISIT (OUTPATIENT)
Dept: FAMILY MEDICINE | Facility: CLINIC | Age: 61
End: 2021-01-13
Payer: COMMERCIAL

## 2021-01-13 VITALS
DIASTOLIC BLOOD PRESSURE: 92 MMHG | SYSTOLIC BLOOD PRESSURE: 162 MMHG | WEIGHT: 178.13 LBS | HEART RATE: 100 BPM | BODY MASS INDEX: 28.63 KG/M2 | HEIGHT: 66 IN | TEMPERATURE: 99 F | OXYGEN SATURATION: 98 %

## 2021-01-13 DIAGNOSIS — E03.9 HYPOTHYROIDISM, UNSPECIFIED TYPE: ICD-10-CM

## 2021-01-13 DIAGNOSIS — R19.8 SYMPTOMS OF GASTROESOPHAGEAL REFLUX: ICD-10-CM

## 2021-01-13 DIAGNOSIS — I10 ESSENTIAL HYPERTENSION: Primary | ICD-10-CM

## 2021-01-13 DIAGNOSIS — R07.89 CHEST TIGHTNESS: ICD-10-CM

## 2021-01-13 PROCEDURE — 90686 IIV4 VACC NO PRSV 0.5 ML IM: CPT | Mod: S$GLB,,, | Performed by: NURSE PRACTITIONER

## 2021-01-13 PROCEDURE — 3077F SYST BP >= 140 MM HG: CPT | Mod: CPTII,S$GLB,, | Performed by: NURSE PRACTITIONER

## 2021-01-13 PROCEDURE — 93010 ELECTROCARDIOGRAM REPORT: CPT | Mod: S$GLB,,, | Performed by: INTERNAL MEDICINE

## 2021-01-13 PROCEDURE — 1126F AMNT PAIN NOTED NONE PRSNT: CPT | Mod: S$GLB,,, | Performed by: NURSE PRACTITIONER

## 2021-01-13 PROCEDURE — 93005 EKG 12-LEAD: ICD-10-PCS | Mod: S$GLB,,, | Performed by: NURSE PRACTITIONER

## 2021-01-13 PROCEDURE — 99214 OFFICE O/P EST MOD 30 MIN: CPT | Mod: 25,S$GLB,, | Performed by: NURSE PRACTITIONER

## 2021-01-13 PROCEDURE — 1126F PR PAIN SEVERITY QUANTIFIED, NO PAIN PRESENT: ICD-10-PCS | Mod: S$GLB,,, | Performed by: NURSE PRACTITIONER

## 2021-01-13 PROCEDURE — 99214 PR OFFICE/OUTPT VISIT, EST, LEVL IV, 30-39 MIN: ICD-10-PCS | Mod: 25,S$GLB,, | Performed by: NURSE PRACTITIONER

## 2021-01-13 PROCEDURE — 3080F DIAST BP >= 90 MM HG: CPT | Mod: CPTII,S$GLB,, | Performed by: NURSE PRACTITIONER

## 2021-01-13 PROCEDURE — 3080F PR MOST RECENT DIASTOLIC BLOOD PRESSURE >= 90 MM HG: ICD-10-PCS | Mod: CPTII,S$GLB,, | Performed by: NURSE PRACTITIONER

## 2021-01-13 PROCEDURE — 3008F PR BODY MASS INDEX (BMI) DOCUMENTED: ICD-10-PCS | Mod: CPTII,S$GLB,, | Performed by: NURSE PRACTITIONER

## 2021-01-13 PROCEDURE — 90471 FLU VACCINE (QUAD) GREATER THAN OR EQUAL TO 3YO PRESERVATIVE FREE IM: ICD-10-PCS | Mod: S$GLB,,, | Performed by: NURSE PRACTITIONER

## 2021-01-13 PROCEDURE — 3008F BODY MASS INDEX DOCD: CPT | Mod: CPTII,S$GLB,, | Performed by: NURSE PRACTITIONER

## 2021-01-13 PROCEDURE — 93005 ELECTROCARDIOGRAM TRACING: CPT | Mod: S$GLB,,, | Performed by: NURSE PRACTITIONER

## 2021-01-13 PROCEDURE — 90686 FLU VACCINE (QUAD) GREATER THAN OR EQUAL TO 3YO PRESERVATIVE FREE IM: ICD-10-PCS | Mod: S$GLB,,, | Performed by: NURSE PRACTITIONER

## 2021-01-13 PROCEDURE — 99999 PR PBB SHADOW E&M-EST. PATIENT-LVL V: CPT | Mod: PBBFAC,,, | Performed by: NURSE PRACTITIONER

## 2021-01-13 PROCEDURE — 93010 EKG 12-LEAD: ICD-10-PCS | Mod: S$GLB,,, | Performed by: INTERNAL MEDICINE

## 2021-01-13 PROCEDURE — 99999 PR PBB SHADOW E&M-EST. PATIENT-LVL V: ICD-10-PCS | Mod: PBBFAC,,, | Performed by: NURSE PRACTITIONER

## 2021-01-13 PROCEDURE — 3077F PR MOST RECENT SYSTOLIC BLOOD PRESSURE >= 140 MM HG: ICD-10-PCS | Mod: CPTII,S$GLB,, | Performed by: NURSE PRACTITIONER

## 2021-01-13 PROCEDURE — 90471 IMMUNIZATION ADMIN: CPT | Mod: S$GLB,,, | Performed by: NURSE PRACTITIONER

## 2021-01-13 RX ORDER — DILTIAZEM HYDROCHLORIDE 240 MG/1
240 CAPSULE, COATED, EXTENDED RELEASE ORAL DAILY
Qty: 30 CAPSULE | Refills: 3 | Status: SHIPPED | OUTPATIENT
Start: 2021-01-13 | End: 2021-06-03 | Stop reason: SDUPTHER

## 2021-01-17 LAB
ALBUMIN SERPL-MCNC: 4.2 G/DL (ref 3.6–5.1)
ALBUMIN/GLOB SERPL: 1.6 (CALC) (ref 1–2.5)
ALP SERPL-CCNC: 69 U/L (ref 37–153)
ALT SERPL-CCNC: 14 U/L (ref 6–29)
AST SERPL-CCNC: 14 U/L (ref 10–35)
BASOPHILS # BLD AUTO: 50 CELLS/UL (ref 0–200)
BASOPHILS NFR BLD AUTO: 0.8 %
BILIRUB SERPL-MCNC: 0.5 MG/DL (ref 0.2–1.2)
BUN SERPL-MCNC: 16 MG/DL (ref 7–25)
BUN/CREAT SERPL: NORMAL (CALC) (ref 6–22)
CALCIUM SERPL-MCNC: 9.4 MG/DL (ref 8.6–10.4)
CHLORIDE SERPL-SCNC: 106 MMOL/L (ref 98–110)
CHOLEST SERPL-MCNC: 236 MG/DL
CHOLEST/HDLC SERPL: 3.4 (CALC)
CO2 SERPL-SCNC: 25 MMOL/L (ref 20–32)
CREAT SERPL-MCNC: 0.89 MG/DL (ref 0.5–0.99)
EOSINOPHIL # BLD AUTO: 158 CELLS/UL (ref 15–500)
EOSINOPHIL NFR BLD AUTO: 2.5 %
ERYTHROCYTE [DISTWIDTH] IN BLOOD BY AUTOMATED COUNT: 13.1 % (ref 11–15)
GFRSERPLBLD MDRD-ARVRAT: 70 ML/MIN/1.73M2
GLOBULIN SER CALC-MCNC: 2.7 G/DL (CALC) (ref 1.9–3.7)
GLUCOSE SERPL-MCNC: 90 MG/DL (ref 65–99)
HCT VFR BLD AUTO: 37.7 % (ref 35–45)
HDLC SERPL-MCNC: 70 MG/DL
HGB BLD-MCNC: 12.8 G/DL (ref 11.7–15.5)
LDLC SERPL CALC-MCNC: 140 MG/DL (CALC)
LYMPHOCYTES # BLD AUTO: 1462 CELLS/UL (ref 850–3900)
LYMPHOCYTES NFR BLD AUTO: 23.2 %
MCH RBC QN AUTO: 32.6 PG (ref 27–33)
MCHC RBC AUTO-ENTMCNC: 34 G/DL (ref 32–36)
MCV RBC AUTO: 95.9 FL (ref 80–100)
MONOCYTES # BLD AUTO: 460 CELLS/UL (ref 200–950)
MONOCYTES NFR BLD AUTO: 7.3 %
NEUTROPHILS # BLD AUTO: 4171 CELLS/UL (ref 1500–7800)
NEUTROPHILS NFR BLD AUTO: 66.2 %
NONHDLC SERPL-MCNC: 166 MG/DL (CALC)
PLATELET # BLD AUTO: 329 THOUSAND/UL (ref 140–400)
PMV BLD REES-ECKER: 10.6 FL (ref 7.5–12.5)
POTASSIUM SERPL-SCNC: 4.2 MMOL/L (ref 3.5–5.3)
PROT SERPL-MCNC: 6.9 G/DL (ref 6.1–8.1)
RBC # BLD AUTO: 3.93 MILLION/UL (ref 3.8–5.1)
SODIUM SERPL-SCNC: 143 MMOL/L (ref 135–146)
TRIGL SERPL-MCNC: 136 MG/DL
TSH SERPL-ACNC: 1.01 MIU/L (ref 0.4–4.5)
WBC # BLD AUTO: 6.3 THOUSAND/UL (ref 3.8–10.8)

## 2021-01-27 ENCOUNTER — OFFICE VISIT (OUTPATIENT)
Dept: FAMILY MEDICINE | Facility: CLINIC | Age: 61
End: 2021-01-27
Payer: COMMERCIAL

## 2021-01-27 VITALS
HEIGHT: 66 IN | OXYGEN SATURATION: 98 % | DIASTOLIC BLOOD PRESSURE: 88 MMHG | HEART RATE: 66 BPM | SYSTOLIC BLOOD PRESSURE: 138 MMHG | BODY MASS INDEX: 28.56 KG/M2 | WEIGHT: 177.69 LBS | TEMPERATURE: 99 F

## 2021-01-27 DIAGNOSIS — R19.8 SYMPTOMS OF GASTROESOPHAGEAL REFLUX: ICD-10-CM

## 2021-01-27 DIAGNOSIS — I10 ESSENTIAL HYPERTENSION: Primary | ICD-10-CM

## 2021-01-27 PROCEDURE — 1126F PR PAIN SEVERITY QUANTIFIED, NO PAIN PRESENT: ICD-10-PCS | Mod: S$GLB,,, | Performed by: NURSE PRACTITIONER

## 2021-01-27 PROCEDURE — 1126F AMNT PAIN NOTED NONE PRSNT: CPT | Mod: S$GLB,,, | Performed by: NURSE PRACTITIONER

## 2021-01-27 PROCEDURE — 99999 PR PBB SHADOW E&M-EST. PATIENT-LVL IV: ICD-10-PCS | Mod: PBBFAC,,, | Performed by: NURSE PRACTITIONER

## 2021-01-27 PROCEDURE — 99213 PR OFFICE/OUTPT VISIT, EST, LEVL III, 20-29 MIN: ICD-10-PCS | Mod: S$GLB,,, | Performed by: NURSE PRACTITIONER

## 2021-01-27 PROCEDURE — 3075F SYST BP GE 130 - 139MM HG: CPT | Mod: CPTII,S$GLB,, | Performed by: NURSE PRACTITIONER

## 2021-01-27 PROCEDURE — 3008F PR BODY MASS INDEX (BMI) DOCUMENTED: ICD-10-PCS | Mod: CPTII,S$GLB,, | Performed by: NURSE PRACTITIONER

## 2021-01-27 PROCEDURE — 3075F PR MOST RECENT SYSTOLIC BLOOD PRESS GE 130-139MM HG: ICD-10-PCS | Mod: CPTII,S$GLB,, | Performed by: NURSE PRACTITIONER

## 2021-01-27 PROCEDURE — 3008F BODY MASS INDEX DOCD: CPT | Mod: CPTII,S$GLB,, | Performed by: NURSE PRACTITIONER

## 2021-01-27 PROCEDURE — 3079F DIAST BP 80-89 MM HG: CPT | Mod: CPTII,S$GLB,, | Performed by: NURSE PRACTITIONER

## 2021-01-27 PROCEDURE — 3079F PR MOST RECENT DIASTOLIC BLOOD PRESSURE 80-89 MM HG: ICD-10-PCS | Mod: CPTII,S$GLB,, | Performed by: NURSE PRACTITIONER

## 2021-01-27 PROCEDURE — 99999 PR PBB SHADOW E&M-EST. PATIENT-LVL IV: CPT | Mod: PBBFAC,,, | Performed by: NURSE PRACTITIONER

## 2021-01-27 PROCEDURE — 99213 OFFICE O/P EST LOW 20 MIN: CPT | Mod: S$GLB,,, | Performed by: NURSE PRACTITIONER

## 2021-03-10 ENCOUNTER — OFFICE VISIT (OUTPATIENT)
Dept: FAMILY MEDICINE | Facility: CLINIC | Age: 61
End: 2021-03-10
Payer: COMMERCIAL

## 2021-03-10 DIAGNOSIS — J01.00 ACUTE MAXILLARY SINUSITIS, RECURRENCE NOT SPECIFIED: Primary | ICD-10-CM

## 2021-03-10 DIAGNOSIS — R09.81 NASAL CONGESTION: ICD-10-CM

## 2021-03-10 PROCEDURE — 99212 OFFICE O/P EST SF 10 MIN: CPT | Mod: 95,,, | Performed by: NURSE PRACTITIONER

## 2021-03-10 PROCEDURE — 99212 PR OFFICE/OUTPT VISIT, EST, LEVL II, 10-19 MIN: ICD-10-PCS | Mod: 95,,, | Performed by: NURSE PRACTITIONER

## 2021-03-10 RX ORDER — DOXYCYCLINE 100 MG/1
100 CAPSULE ORAL 2 TIMES DAILY
Qty: 14 CAPSULE | Refills: 0 | Status: SHIPPED | OUTPATIENT
Start: 2021-03-10 | End: 2021-03-17

## 2021-03-10 RX ORDER — FLUTICASONE PROPIONATE 50 MCG
1 SPRAY, SUSPENSION (ML) NASAL DAILY
Qty: 16 G | Refills: 0 | Status: SHIPPED | OUTPATIENT
Start: 2021-03-10

## 2021-04-06 ENCOUNTER — PATIENT MESSAGE (OUTPATIENT)
Dept: ADMINISTRATIVE | Facility: HOSPITAL | Age: 61
End: 2021-04-06

## 2021-04-07 ENCOUNTER — PATIENT OUTREACH (OUTPATIENT)
Dept: ADMINISTRATIVE | Facility: OTHER | Age: 61
End: 2021-04-07

## 2021-04-07 DIAGNOSIS — Z12.31 ENCOUNTER FOR SCREENING MAMMOGRAM FOR MALIGNANT NEOPLASM OF BREAST: Primary | ICD-10-CM

## 2021-04-09 ENCOUNTER — OFFICE VISIT (OUTPATIENT)
Dept: OBSTETRICS AND GYNECOLOGY | Facility: CLINIC | Age: 61
End: 2021-04-09
Payer: COMMERCIAL

## 2021-04-09 ENCOUNTER — HOSPITAL ENCOUNTER (OUTPATIENT)
Dept: RADIOLOGY | Facility: HOSPITAL | Age: 61
Discharge: HOME | End: 2021-04-09
Attending: OBSTETRICS & GYNECOLOGY
Payer: COMMERCIAL

## 2021-04-09 VITALS
DIASTOLIC BLOOD PRESSURE: 80 MMHG | HEIGHT: 66 IN | SYSTOLIC BLOOD PRESSURE: 130 MMHG | BODY MASS INDEX: 28.42 KG/M2 | WEIGHT: 176.81 LBS

## 2021-04-09 DIAGNOSIS — Z12.31 VISIT FOR SCREENING MAMMOGRAM: ICD-10-CM

## 2021-04-09 DIAGNOSIS — Z01.419 GYNECOLOGIC EXAM NORMAL: Primary | ICD-10-CM

## 2021-04-09 PROCEDURE — 77063 MAMMO DIGITAL SCREENING BILAT WITH TOMO: ICD-10-PCS | Mod: 26,,, | Performed by: RADIOLOGY

## 2021-04-09 PROCEDURE — 3008F BODY MASS INDEX DOCD: CPT | Mod: CPTII,S$GLB,, | Performed by: OBSTETRICS & GYNECOLOGY

## 2021-04-09 PROCEDURE — 3008F PR BODY MASS INDEX (BMI) DOCUMENTED: ICD-10-PCS | Mod: CPTII,S$GLB,, | Performed by: OBSTETRICS & GYNECOLOGY

## 2021-04-09 PROCEDURE — 77063 BREAST TOMOSYNTHESIS BI: CPT | Mod: 26,,, | Performed by: RADIOLOGY

## 2021-04-09 PROCEDURE — 99396 PREV VISIT EST AGE 40-64: CPT | Mod: S$GLB,,, | Performed by: OBSTETRICS & GYNECOLOGY

## 2021-04-09 PROCEDURE — 99999 PR PBB SHADOW E&M-EST. PATIENT-LVL IV: ICD-10-PCS | Mod: PBBFAC,,, | Performed by: OBSTETRICS & GYNECOLOGY

## 2021-04-09 PROCEDURE — 1126F AMNT PAIN NOTED NONE PRSNT: CPT | Mod: S$GLB,,, | Performed by: OBSTETRICS & GYNECOLOGY

## 2021-04-09 PROCEDURE — 99396 PR PREVENTIVE VISIT,EST,40-64: ICD-10-PCS | Mod: S$GLB,,, | Performed by: OBSTETRICS & GYNECOLOGY

## 2021-04-09 PROCEDURE — 77067 SCR MAMMO BI INCL CAD: CPT | Mod: TC,PN

## 2021-04-09 PROCEDURE — 77067 SCR MAMMO BI INCL CAD: CPT | Mod: 26,,, | Performed by: RADIOLOGY

## 2021-04-09 PROCEDURE — 77067 MAMMO DIGITAL SCREENING BILAT WITH TOMO: ICD-10-PCS | Mod: 26,,, | Performed by: RADIOLOGY

## 2021-04-09 PROCEDURE — 99999 PR PBB SHADOW E&M-EST. PATIENT-LVL IV: CPT | Mod: PBBFAC,,, | Performed by: OBSTETRICS & GYNECOLOGY

## 2021-04-09 PROCEDURE — 1126F PR PAIN SEVERITY QUANTIFIED, NO PAIN PRESENT: ICD-10-PCS | Mod: S$GLB,,, | Performed by: OBSTETRICS & GYNECOLOGY

## 2021-05-27 ENCOUNTER — HOSPITAL ENCOUNTER (OUTPATIENT)
Dept: RADIOLOGY | Facility: HOSPITAL | Age: 61
Discharge: HOME | End: 2021-05-27
Attending: INTERNAL MEDICINE
Payer: COMMERCIAL

## 2021-05-27 DIAGNOSIS — N28.1 RENAL CYST: ICD-10-CM

## 2021-05-27 PROCEDURE — 76770 US EXAM ABDO BACK WALL COMP: CPT | Mod: TC,PO

## 2021-05-27 PROCEDURE — 76770 US EXAM ABDO BACK WALL COMP: CPT | Mod: 26,,, | Performed by: RADIOLOGY

## 2021-05-27 PROCEDURE — 76770 US RETROPERITONEAL COMPLETE: ICD-10-PCS | Mod: 26,,, | Performed by: RADIOLOGY

## 2021-05-31 ENCOUNTER — PATIENT OUTREACH (OUTPATIENT)
Dept: ADMINISTRATIVE | Facility: OTHER | Age: 61
End: 2021-05-31

## 2021-06-03 ENCOUNTER — TELEPHONE (OUTPATIENT)
Dept: NEPHROLOGY | Facility: CLINIC | Age: 61
End: 2021-06-03

## 2021-06-03 ENCOUNTER — OFFICE VISIT (OUTPATIENT)
Dept: NEPHROLOGY | Facility: CLINIC | Age: 61
End: 2021-06-03
Payer: COMMERCIAL

## 2021-06-03 ENCOUNTER — OFFICE VISIT (OUTPATIENT)
Dept: CARDIOLOGY | Facility: CLINIC | Age: 61
End: 2021-06-03
Payer: COMMERCIAL

## 2021-06-03 VITALS
HEART RATE: 71 BPM | BODY MASS INDEX: 28.77 KG/M2 | WEIGHT: 179 LBS | HEIGHT: 66 IN | HEART RATE: 71 BPM | DIASTOLIC BLOOD PRESSURE: 80 MMHG | OXYGEN SATURATION: 98 % | HEIGHT: 66 IN | WEIGHT: 179.44 LBS | BODY MASS INDEX: 28.84 KG/M2 | DIASTOLIC BLOOD PRESSURE: 80 MMHG | SYSTOLIC BLOOD PRESSURE: 134 MMHG | SYSTOLIC BLOOD PRESSURE: 134 MMHG

## 2021-06-03 DIAGNOSIS — N28.1 RENAL CYST: Primary | ICD-10-CM

## 2021-06-03 DIAGNOSIS — M35.3 PMR (POLYMYALGIA RHEUMATICA): ICD-10-CM

## 2021-06-03 DIAGNOSIS — I10 ESSENTIAL HYPERTENSION: Primary | ICD-10-CM

## 2021-06-03 DIAGNOSIS — I10 ESSENTIAL HYPERTENSION: ICD-10-CM

## 2021-06-03 DIAGNOSIS — Z82.49 FAMILY HISTORY OF CARDIOVASCULAR DISEASE: Chronic | ICD-10-CM

## 2021-06-03 PROCEDURE — 3075F PR MOST RECENT SYSTOLIC BLOOD PRESS GE 130-139MM HG: ICD-10-PCS | Mod: CPTII,S$GLB,, | Performed by: INTERNAL MEDICINE

## 2021-06-03 PROCEDURE — 99214 OFFICE O/P EST MOD 30 MIN: CPT | Mod: S$GLB,,, | Performed by: INTERNAL MEDICINE

## 2021-06-03 PROCEDURE — 3008F BODY MASS INDEX DOCD: CPT | Mod: CPTII,S$GLB,, | Performed by: INTERNAL MEDICINE

## 2021-06-03 PROCEDURE — 3008F PR BODY MASS INDEX (BMI) DOCUMENTED: ICD-10-PCS | Mod: CPTII,S$GLB,, | Performed by: INTERNAL MEDICINE

## 2021-06-03 PROCEDURE — 99999 PR PBB SHADOW E&M-EST. PATIENT-LVL IV: ICD-10-PCS | Mod: PBBFAC,,, | Performed by: INTERNAL MEDICINE

## 2021-06-03 PROCEDURE — 99214 PR OFFICE/OUTPT VISIT, EST, LEVL IV, 30-39 MIN: ICD-10-PCS | Mod: S$GLB,,, | Performed by: INTERNAL MEDICINE

## 2021-06-03 PROCEDURE — 3079F DIAST BP 80-89 MM HG: CPT | Mod: CPTII,S$GLB,, | Performed by: INTERNAL MEDICINE

## 2021-06-03 PROCEDURE — 3079F PR MOST RECENT DIASTOLIC BLOOD PRESSURE 80-89 MM HG: ICD-10-PCS | Mod: CPTII,S$GLB,, | Performed by: INTERNAL MEDICINE

## 2021-06-03 PROCEDURE — 3075F SYST BP GE 130 - 139MM HG: CPT | Mod: CPTII,S$GLB,, | Performed by: INTERNAL MEDICINE

## 2021-06-03 PROCEDURE — 1126F AMNT PAIN NOTED NONE PRSNT: CPT | Mod: S$GLB,,, | Performed by: INTERNAL MEDICINE

## 2021-06-03 PROCEDURE — 99999 PR PBB SHADOW E&M-EST. PATIENT-LVL III: ICD-10-PCS | Mod: PBBFAC,,, | Performed by: INTERNAL MEDICINE

## 2021-06-03 PROCEDURE — 1126F PR PAIN SEVERITY QUANTIFIED, NO PAIN PRESENT: ICD-10-PCS | Mod: S$GLB,,, | Performed by: INTERNAL MEDICINE

## 2021-06-03 PROCEDURE — 99999 PR PBB SHADOW E&M-EST. PATIENT-LVL III: CPT | Mod: PBBFAC,,, | Performed by: INTERNAL MEDICINE

## 2021-06-03 PROCEDURE — 99999 PR PBB SHADOW E&M-EST. PATIENT-LVL IV: CPT | Mod: PBBFAC,,, | Performed by: INTERNAL MEDICINE

## 2021-06-03 RX ORDER — DILTIAZEM HYDROCHLORIDE 180 MG/1
180 CAPSULE, COATED, EXTENDED RELEASE ORAL DAILY
Qty: 90 CAPSULE | Refills: 6 | Status: SHIPPED | OUTPATIENT
Start: 2021-06-03 | End: 2021-11-11

## 2021-06-29 ENCOUNTER — OFFICE VISIT (OUTPATIENT)
Dept: FAMILY MEDICINE | Facility: CLINIC | Age: 61
End: 2021-06-29
Payer: COMMERCIAL

## 2021-06-29 VITALS
RESPIRATION RATE: 14 BRPM | HEIGHT: 66 IN | SYSTOLIC BLOOD PRESSURE: 122 MMHG | HEART RATE: 76 BPM | TEMPERATURE: 98 F | DIASTOLIC BLOOD PRESSURE: 72 MMHG | WEIGHT: 176.94 LBS | BODY MASS INDEX: 28.44 KG/M2

## 2021-06-29 DIAGNOSIS — R10.811 RIGHT UPPER QUADRANT ABDOMINAL TENDERNESS, REBOUND TENDERNESS PRESENCE NOT SPECIFIED: ICD-10-CM

## 2021-06-29 DIAGNOSIS — Z00.00 ROUTINE GENERAL MEDICAL EXAMINATION AT A HEALTH CARE FACILITY: Primary | ICD-10-CM

## 2021-06-29 DIAGNOSIS — Z12.11 SPECIAL SCREENING FOR MALIGNANT NEOPLASMS, COLON: ICD-10-CM

## 2021-06-29 PROCEDURE — 99396 PREV VISIT EST AGE 40-64: CPT | Mod: S$GLB,,, | Performed by: FAMILY MEDICINE

## 2021-06-29 PROCEDURE — 1125F AMNT PAIN NOTED PAIN PRSNT: CPT | Mod: S$GLB,,, | Performed by: FAMILY MEDICINE

## 2021-06-29 PROCEDURE — 1125F PR PAIN SEVERITY QUANTIFIED, PAIN PRESENT: ICD-10-PCS | Mod: S$GLB,,, | Performed by: FAMILY MEDICINE

## 2021-06-29 PROCEDURE — 99999 PR PBB SHADOW E&M-EST. PATIENT-LVL IV: CPT | Mod: PBBFAC,,, | Performed by: FAMILY MEDICINE

## 2021-06-29 PROCEDURE — 3008F PR BODY MASS INDEX (BMI) DOCUMENTED: ICD-10-PCS | Mod: CPTII,S$GLB,, | Performed by: FAMILY MEDICINE

## 2021-06-29 PROCEDURE — 99396 PR PREVENTIVE VISIT,EST,40-64: ICD-10-PCS | Mod: S$GLB,,, | Performed by: FAMILY MEDICINE

## 2021-06-29 PROCEDURE — 99999 PR PBB SHADOW E&M-EST. PATIENT-LVL IV: ICD-10-PCS | Mod: PBBFAC,,, | Performed by: FAMILY MEDICINE

## 2021-06-29 PROCEDURE — 3008F BODY MASS INDEX DOCD: CPT | Mod: CPTII,S$GLB,, | Performed by: FAMILY MEDICINE

## 2021-06-30 LAB — VIT B12 SERPL-MCNC: 508 PG/ML (ref 200–1100)

## 2021-07-01 ENCOUNTER — PATIENT MESSAGE (OUTPATIENT)
Dept: GASTROENTEROLOGY | Facility: CLINIC | Age: 61
End: 2021-07-01

## 2021-07-13 ENCOUNTER — PATIENT MESSAGE (OUTPATIENT)
Dept: GASTROENTEROLOGY | Facility: CLINIC | Age: 61
End: 2021-07-13

## 2021-07-16 ENCOUNTER — HOSPITAL ENCOUNTER (OUTPATIENT)
Dept: RADIOLOGY | Facility: HOSPITAL | Age: 61
Discharge: HOME | End: 2021-07-16
Attending: FAMILY MEDICINE
Payer: COMMERCIAL

## 2021-07-16 DIAGNOSIS — R10.811 RIGHT UPPER QUADRANT ABDOMINAL TENDERNESS, REBOUND TENDERNESS PRESENCE NOT SPECIFIED: ICD-10-CM

## 2021-07-16 PROCEDURE — 76700 US EXAM ABDOM COMPLETE: CPT | Mod: 26,,, | Performed by: RADIOLOGY

## 2021-07-16 PROCEDURE — 76700 US ABDOMEN COMPLETE: ICD-10-PCS | Mod: 26,,, | Performed by: RADIOLOGY

## 2021-07-16 PROCEDURE — 76700 US EXAM ABDOM COMPLETE: CPT | Mod: TC,PO

## 2021-08-26 DIAGNOSIS — Z01.818 PRE-OP TESTING: ICD-10-CM

## 2021-09-02 ENCOUNTER — TELEPHONE (OUTPATIENT)
Dept: GASTROENTEROLOGY | Facility: CLINIC | Age: 61
End: 2021-09-02

## 2021-10-06 ENCOUNTER — TELEPHONE (OUTPATIENT)
Dept: GASTROENTEROLOGY | Facility: CLINIC | Age: 61
End: 2021-10-06

## 2021-11-08 ENCOUNTER — OFFICE VISIT (OUTPATIENT)
Dept: FAMILY MEDICINE | Facility: CLINIC | Age: 61
End: 2021-11-08
Payer: COMMERCIAL

## 2021-11-08 VITALS
BODY MASS INDEX: 35.17 KG/M2 | SYSTOLIC BLOOD PRESSURE: 176 MMHG | HEIGHT: 60 IN | HEART RATE: 86 BPM | OXYGEN SATURATION: 98 % | WEIGHT: 179.13 LBS | DIASTOLIC BLOOD PRESSURE: 92 MMHG

## 2021-11-08 DIAGNOSIS — I10 ESSENTIAL HYPERTENSION: ICD-10-CM

## 2021-11-08 DIAGNOSIS — G44.329 CHRONIC POST-TRAUMATIC HEADACHE, NOT INTRACTABLE: Primary | ICD-10-CM

## 2021-11-08 DIAGNOSIS — Z00.00 ROUTINE GENERAL MEDICAL EXAMINATION AT A HEALTH CARE FACILITY: ICD-10-CM

## 2021-11-08 DIAGNOSIS — E03.9 HYPOTHYROIDISM, UNSPECIFIED TYPE: ICD-10-CM

## 2021-11-08 PROCEDURE — 1159F MED LIST DOCD IN RCRD: CPT | Mod: CPTII,S$GLB,, | Performed by: FAMILY MEDICINE

## 2021-11-08 PROCEDURE — 90686 FLU VACCINE (QUAD) GREATER THAN OR EQUAL TO 3YO PRESERVATIVE FREE IM: ICD-10-PCS | Mod: S$GLB,,, | Performed by: FAMILY MEDICINE

## 2021-11-08 PROCEDURE — 99214 OFFICE O/P EST MOD 30 MIN: CPT | Mod: 25,S$GLB,, | Performed by: FAMILY MEDICINE

## 2021-11-08 PROCEDURE — 1160F RVW MEDS BY RX/DR IN RCRD: CPT | Mod: CPTII,S$GLB,, | Performed by: FAMILY MEDICINE

## 2021-11-08 PROCEDURE — 90471 IMMUNIZATION ADMIN: CPT | Mod: S$GLB,,, | Performed by: FAMILY MEDICINE

## 2021-11-08 PROCEDURE — 1160F PR REVIEW ALL MEDS BY PRESCRIBER/CLIN PHARMACIST DOCUMENTED: ICD-10-PCS | Mod: CPTII,S$GLB,, | Performed by: FAMILY MEDICINE

## 2021-11-08 PROCEDURE — 3080F DIAST BP >= 90 MM HG: CPT | Mod: CPTII,S$GLB,, | Performed by: FAMILY MEDICINE

## 2021-11-08 PROCEDURE — 3008F BODY MASS INDEX DOCD: CPT | Mod: CPTII,S$GLB,, | Performed by: FAMILY MEDICINE

## 2021-11-08 PROCEDURE — 3080F PR MOST RECENT DIASTOLIC BLOOD PRESSURE >= 90 MM HG: ICD-10-PCS | Mod: CPTII,S$GLB,, | Performed by: FAMILY MEDICINE

## 2021-11-08 PROCEDURE — 3066F NEPHROPATHY DOC TX: CPT | Mod: CPTII,S$GLB,, | Performed by: FAMILY MEDICINE

## 2021-11-08 PROCEDURE — 1159F PR MEDICATION LIST DOCUMENTED IN MEDICAL RECORD: ICD-10-PCS | Mod: CPTII,S$GLB,, | Performed by: FAMILY MEDICINE

## 2021-11-08 PROCEDURE — 3066F PR DOCUMENTATION OF TREATMENT FOR NEPHROPATHY: ICD-10-PCS | Mod: CPTII,S$GLB,, | Performed by: FAMILY MEDICINE

## 2021-11-08 PROCEDURE — 3077F PR MOST RECENT SYSTOLIC BLOOD PRESSURE >= 140 MM HG: ICD-10-PCS | Mod: CPTII,S$GLB,, | Performed by: FAMILY MEDICINE

## 2021-11-08 PROCEDURE — 99999 PR PBB SHADOW E&M-EST. PATIENT-LVL V: ICD-10-PCS | Mod: PBBFAC,,, | Performed by: FAMILY MEDICINE

## 2021-11-08 PROCEDURE — 99214 PR OFFICE/OUTPT VISIT, EST, LEVL IV, 30-39 MIN: ICD-10-PCS | Mod: 25,S$GLB,, | Performed by: FAMILY MEDICINE

## 2021-11-08 PROCEDURE — 3077F SYST BP >= 140 MM HG: CPT | Mod: CPTII,S$GLB,, | Performed by: FAMILY MEDICINE

## 2021-11-08 PROCEDURE — 90471 FLU VACCINE (QUAD) GREATER THAN OR EQUAL TO 3YO PRESERVATIVE FREE IM: ICD-10-PCS | Mod: S$GLB,,, | Performed by: FAMILY MEDICINE

## 2021-11-08 PROCEDURE — 3008F PR BODY MASS INDEX (BMI) DOCUMENTED: ICD-10-PCS | Mod: CPTII,S$GLB,, | Performed by: FAMILY MEDICINE

## 2021-11-08 PROCEDURE — 90686 IIV4 VACC NO PRSV 0.5 ML IM: CPT | Mod: S$GLB,,, | Performed by: FAMILY MEDICINE

## 2021-11-08 PROCEDURE — 99999 PR PBB SHADOW E&M-EST. PATIENT-LVL V: CPT | Mod: PBBFAC,,, | Performed by: FAMILY MEDICINE

## 2021-11-08 RX ORDER — PREDNISONE 5 MG/1
TABLET ORAL
COMMUNITY
Start: 2021-10-20 | End: 2022-01-24 | Stop reason: ALTCHOICE

## 2021-11-08 RX ORDER — METHOCARBAMOL 500 MG/1
500 TABLET, FILM COATED ORAL 4 TIMES DAILY
Qty: 40 TABLET | Refills: 0 | Status: SHIPPED | OUTPATIENT
Start: 2021-11-08 | End: 2021-11-18

## 2021-11-08 RX ORDER — CLONIDINE HYDROCHLORIDE 0.1 MG/1
0.1 TABLET ORAL 3 TIMES DAILY PRN
Qty: 90 TABLET | Refills: 6 | Status: SHIPPED | OUTPATIENT
Start: 2021-11-08 | End: 2022-11-08

## 2021-11-08 RX ORDER — BUTALBITAL, ACETAMINOPHEN AND CAFFEINE 50; 325; 40 MG/1; MG/1; MG/1
1 TABLET ORAL EVERY 6 HOURS PRN
Qty: 20 TABLET | Refills: 3 | Status: SHIPPED | OUTPATIENT
Start: 2021-11-08

## 2021-11-09 ENCOUNTER — HOSPITAL ENCOUNTER (OUTPATIENT)
Dept: RADIOLOGY | Facility: HOSPITAL | Age: 61
Discharge: HOME | End: 2021-11-09
Attending: FAMILY MEDICINE
Payer: COMMERCIAL

## 2021-11-09 DIAGNOSIS — G44.329 CHRONIC POST-TRAUMATIC HEADACHE, NOT INTRACTABLE: ICD-10-CM

## 2021-11-09 PROCEDURE — 70450 CT HEAD/BRAIN W/O DYE: CPT | Mod: TC,PO

## 2021-11-09 PROCEDURE — 70450 CT HEAD WITHOUT CONTRAST: ICD-10-PCS | Mod: 26,,, | Performed by: RADIOLOGY

## 2021-11-09 PROCEDURE — 70450 CT HEAD/BRAIN W/O DYE: CPT | Mod: 26,,, | Performed by: RADIOLOGY

## 2021-11-10 ENCOUNTER — PATIENT MESSAGE (OUTPATIENT)
Dept: FAMILY MEDICINE | Facility: CLINIC | Age: 61
End: 2021-11-10
Payer: COMMERCIAL

## 2021-11-11 ENCOUNTER — PATIENT MESSAGE (OUTPATIENT)
Dept: FAMILY MEDICINE | Facility: CLINIC | Age: 61
End: 2021-11-11
Payer: COMMERCIAL

## 2021-11-11 RX ORDER — DILTIAZEM HYDROCHLORIDE 240 MG/1
240 CAPSULE, EXTENDED RELEASE ORAL DAILY
Qty: 90 CAPSULE | Refills: 0 | Status: SHIPPED | OUTPATIENT
Start: 2021-11-11 | End: 2022-02-08 | Stop reason: SDUPTHER

## 2021-11-12 ENCOUNTER — ANESTHESIA (OUTPATIENT)
Dept: ENDOSCOPY | Facility: HOSPITAL | Age: 61
End: 2021-11-12
Payer: COMMERCIAL

## 2021-11-12 ENCOUNTER — ANESTHESIA EVENT (OUTPATIENT)
Dept: ENDOSCOPY | Facility: HOSPITAL | Age: 61
End: 2021-11-12
Payer: COMMERCIAL

## 2021-11-12 ENCOUNTER — HOSPITAL ENCOUNTER (OUTPATIENT)
Facility: HOSPITAL | Age: 61
Discharge: HOME | End: 2021-11-12
Attending: INTERNAL MEDICINE | Admitting: INTERNAL MEDICINE
Payer: COMMERCIAL

## 2021-11-12 VITALS
WEIGHT: 176.38 LBS | HEART RATE: 69 BPM | SYSTOLIC BLOOD PRESSURE: 196 MMHG | TEMPERATURE: 97 F | RESPIRATION RATE: 11 BRPM | DIASTOLIC BLOOD PRESSURE: 81 MMHG | BODY MASS INDEX: 28.34 KG/M2 | HEIGHT: 66 IN | OXYGEN SATURATION: 96 %

## 2021-11-12 DIAGNOSIS — Z12.11 SCREEN FOR COLON CANCER: ICD-10-CM

## 2021-11-12 PROCEDURE — D9220A PRA ANESTHESIA: Mod: CRNA,,, | Performed by: NURSE ANESTHETIST, CERTIFIED REGISTERED

## 2021-11-12 PROCEDURE — D9220A PRA ANESTHESIA: ICD-10-PCS | Mod: CRNA,,, | Performed by: NURSE ANESTHETIST, CERTIFIED REGISTERED

## 2021-11-12 PROCEDURE — 37000009 HC ANESTHESIA EA ADD 15 MINS: Mod: PO | Performed by: INTERNAL MEDICINE

## 2021-11-12 PROCEDURE — 63600175 PHARM REV CODE 636 W HCPCS: Mod: PO | Performed by: INTERNAL MEDICINE

## 2021-11-12 PROCEDURE — G0121 COLON CA SCRN NOT HI RSK IND: HCPCS | Mod: PO | Performed by: INTERNAL MEDICINE

## 2021-11-12 PROCEDURE — G0121 COLON CA SCRN NOT HI RSK IND: ICD-10-PCS | Mod: ,,, | Performed by: INTERNAL MEDICINE

## 2021-11-12 PROCEDURE — 25000003 PHARM REV CODE 250: Mod: PO | Performed by: NURSE ANESTHETIST, CERTIFIED REGISTERED

## 2021-11-12 PROCEDURE — G0121 COLON CA SCRN NOT HI RSK IND: HCPCS | Mod: ,,, | Performed by: INTERNAL MEDICINE

## 2021-11-12 PROCEDURE — 63600175 PHARM REV CODE 636 W HCPCS: Mod: PO | Performed by: NURSE ANESTHETIST, CERTIFIED REGISTERED

## 2021-11-12 PROCEDURE — D9220A PRA ANESTHESIA: Mod: ANES,,, | Performed by: ANESTHESIOLOGY

## 2021-11-12 PROCEDURE — D9220A PRA ANESTHESIA: ICD-10-PCS | Mod: ANES,,, | Performed by: ANESTHESIOLOGY

## 2021-11-12 PROCEDURE — 37000008 HC ANESTHESIA 1ST 15 MINUTES: Mod: PO | Performed by: INTERNAL MEDICINE

## 2021-11-12 RX ORDER — SODIUM CHLORIDE, SODIUM LACTATE, POTASSIUM CHLORIDE, CALCIUM CHLORIDE 600; 310; 30; 20 MG/100ML; MG/100ML; MG/100ML; MG/100ML
INJECTION, SOLUTION INTRAVENOUS CONTINUOUS
Status: DISCONTINUED | OUTPATIENT
Start: 2021-11-12 | End: 2021-11-12 | Stop reason: HOSPADM

## 2021-11-12 RX ORDER — LIDOCAINE HCL/PF 100 MG/5ML
SYRINGE (ML) INTRAVENOUS
Status: DISCONTINUED | OUTPATIENT
Start: 2021-11-12 | End: 2021-11-12

## 2021-11-12 RX ORDER — ATROPINE SULFATE 0.4 MG/ML
INJECTION, SOLUTION ENDOTRACHEAL; INTRAMEDULLARY; INTRAMUSCULAR; INTRAVENOUS; SUBCUTANEOUS
Status: DISCONTINUED | OUTPATIENT
Start: 2021-11-12 | End: 2021-11-12

## 2021-11-12 RX ORDER — SODIUM CHLORIDE 0.9 % (FLUSH) 0.9 %
10 SYRINGE (ML) INJECTION
Status: DISCONTINUED | OUTPATIENT
Start: 2021-11-12 | End: 2021-11-12 | Stop reason: HOSPADM

## 2021-11-12 RX ORDER — PROPOFOL 10 MG/ML
VIAL (ML) INTRAVENOUS
Status: DISCONTINUED | OUTPATIENT
Start: 2021-11-12 | End: 2021-11-12

## 2021-11-12 RX ADMIN — SODIUM CHLORIDE, SODIUM LACTATE, POTASSIUM CHLORIDE, AND CALCIUM CHLORIDE: .6; .31; .03; .02 INJECTION, SOLUTION INTRAVENOUS at 11:11

## 2021-11-12 RX ADMIN — PROPOFOL 50 MG: 10 INJECTION, EMULSION INTRAVENOUS at 12:11

## 2021-11-12 RX ADMIN — PROPOFOL 150 MG: 10 INJECTION, EMULSION INTRAVENOUS at 12:11

## 2021-11-12 RX ADMIN — PROPOFOL 25 MG: 10 INJECTION, EMULSION INTRAVENOUS at 12:11

## 2021-11-12 RX ADMIN — LIDOCAINE HYDROCHLORIDE 100 MG: 20 INJECTION, SOLUTION INTRAVENOUS at 12:11

## 2021-11-12 RX ADMIN — ATROPINE SULFATE 0.4 MG: 0.4 INJECTION, SOLUTION INTRAMUSCULAR; INTRAVENOUS; SUBCUTANEOUS at 12:11

## 2021-11-16 ENCOUNTER — PATIENT MESSAGE (OUTPATIENT)
Dept: FAMILY MEDICINE | Facility: CLINIC | Age: 61
End: 2021-11-16
Payer: COMMERCIAL

## 2021-11-17 LAB
25(OH)D3 SERPL-MCNC: 30 NG/ML (ref 30–100)
ALBUMIN SERPL-MCNC: 4 G/DL (ref 3.6–5.1)
ALBUMIN/GLOB SERPL: 1.4 (CALC) (ref 1–2.5)
ALP SERPL-CCNC: 70 U/L (ref 37–153)
ALT SERPL-CCNC: 14 U/L (ref 6–29)
AST SERPL-CCNC: 14 U/L (ref 10–35)
BILIRUB SERPL-MCNC: 0.4 MG/DL (ref 0.2–1.2)
BUN SERPL-MCNC: 14 MG/DL (ref 7–25)
BUN/CREAT SERPL: NORMAL (CALC) (ref 6–22)
CALCIUM SERPL-MCNC: 9.1 MG/DL (ref 8.6–10.4)
CHLORIDE SERPL-SCNC: 102 MMOL/L (ref 98–110)
CO2 SERPL-SCNC: 31 MMOL/L (ref 20–32)
CREAT SERPL-MCNC: 0.76 MG/DL (ref 0.5–0.99)
ERYTHROCYTE [DISTWIDTH] IN BLOOD BY AUTOMATED COUNT: 13 % (ref 11–15)
GLOBULIN SER CALC-MCNC: 2.8 G/DL (CALC) (ref 1.9–3.7)
GLUCOSE SERPL-MCNC: 86 MG/DL (ref 65–99)
HCT VFR BLD AUTO: 37.7 % (ref 35–45)
HGB BLD-MCNC: 12.7 G/DL (ref 11.7–15.5)
MCH RBC QN AUTO: 31.8 PG (ref 27–33)
MCHC RBC AUTO-ENTMCNC: 33.7 G/DL (ref 32–36)
MCV RBC AUTO: 94.5 FL (ref 80–100)
PLATELET # BLD AUTO: 317 THOUSAND/UL (ref 140–400)
PMV BLD REES-ECKER: 10.4 FL (ref 7.5–12.5)
POTASSIUM SERPL-SCNC: 3.9 MMOL/L (ref 3.5–5.3)
PROT SERPL-MCNC: 6.8 G/DL (ref 6.1–8.1)
RBC # BLD AUTO: 3.99 MILLION/UL (ref 3.8–5.1)
SODIUM SERPL-SCNC: 141 MMOL/L (ref 135–146)
TSH SERPL-ACNC: 1.46 MIU/L (ref 0.4–4.5)
VIT B12 SERPL-MCNC: 732 PG/ML (ref 200–1100)
WBC # BLD AUTO: 6.2 THOUSAND/UL (ref 3.8–10.8)

## 2021-12-01 ENCOUNTER — TELEPHONE (OUTPATIENT)
Dept: FAMILY MEDICINE | Facility: CLINIC | Age: 61
End: 2021-12-01

## 2021-12-01 ENCOUNTER — CLINICAL SUPPORT (OUTPATIENT)
Dept: FAMILY MEDICINE | Facility: CLINIC | Age: 61
End: 2021-12-01
Payer: COMMERCIAL

## 2021-12-01 VITALS — SYSTOLIC BLOOD PRESSURE: 152 MMHG | DIASTOLIC BLOOD PRESSURE: 84 MMHG

## 2021-12-01 PROCEDURE — 99999 PR PBB SHADOW E&M-EST. PATIENT-LVL I: ICD-10-PCS | Mod: PBBFAC,,,

## 2021-12-01 PROCEDURE — 99999 PR PBB SHADOW E&M-EST. PATIENT-LVL I: CPT | Mod: PBBFAC,,,

## 2021-12-03 ENCOUNTER — TELEPHONE (OUTPATIENT)
Dept: FAMILY MEDICINE | Facility: CLINIC | Age: 61
End: 2021-12-03
Payer: COMMERCIAL

## 2021-12-09 RX ORDER — VALSARTAN 80 MG/1
80 TABLET ORAL DAILY
Qty: 90 TABLET | Refills: 0 | Status: SHIPPED | OUTPATIENT
Start: 2021-12-09 | End: 2022-01-24 | Stop reason: SDUPTHER

## 2021-12-10 ENCOUNTER — PATIENT MESSAGE (OUTPATIENT)
Dept: FAMILY MEDICINE | Facility: CLINIC | Age: 61
End: 2021-12-10
Payer: COMMERCIAL

## 2022-01-23 ENCOUNTER — PATIENT OUTREACH (OUTPATIENT)
Dept: ADMINISTRATIVE | Facility: OTHER | Age: 62
End: 2022-01-23
Payer: COMMERCIAL

## 2022-01-23 NOTE — PROGRESS NOTES
LINKS immunization registry updated  Care Everywhere updated  Health Maintenance updated  Chart reviewed for overdue Proactive [facility] Encounters (BI) health maintenance testing (CRS, Breast Ca, Diabetic Eye Exam)   Orders entered:N/A

## 2022-01-24 ENCOUNTER — OFFICE VISIT (OUTPATIENT)
Dept: FAMILY MEDICINE | Facility: CLINIC | Age: 62
End: 2022-01-24
Payer: COMMERCIAL

## 2022-01-24 VITALS — DIASTOLIC BLOOD PRESSURE: 83 MMHG | SYSTOLIC BLOOD PRESSURE: 163 MMHG | BODY MASS INDEX: 28.47 KG/M2 | HEIGHT: 66 IN

## 2022-01-24 DIAGNOSIS — M35.3 POLYMYALGIA RHEUMATICA: ICD-10-CM

## 2022-01-24 DIAGNOSIS — G44.52 HEADACHE, NEW DAILY PERSISTENT (NDPH): ICD-10-CM

## 2022-01-24 DIAGNOSIS — I10 ESSENTIAL HYPERTENSION: Primary | ICD-10-CM

## 2022-01-24 PROCEDURE — 3077F PR MOST RECENT SYSTOLIC BLOOD PRESSURE >= 140 MM HG: ICD-10-PCS | Mod: CPTII,95,, | Performed by: FAMILY MEDICINE

## 2022-01-24 PROCEDURE — 3079F DIAST BP 80-89 MM HG: CPT | Mod: CPTII,95,, | Performed by: FAMILY MEDICINE

## 2022-01-24 PROCEDURE — 3079F PR MOST RECENT DIASTOLIC BLOOD PRESSURE 80-89 MM HG: ICD-10-PCS | Mod: CPTII,95,, | Performed by: FAMILY MEDICINE

## 2022-01-24 PROCEDURE — 1159F PR MEDICATION LIST DOCUMENTED IN MEDICAL RECORD: ICD-10-PCS | Mod: CPTII,95,, | Performed by: FAMILY MEDICINE

## 2022-01-24 PROCEDURE — 1160F PR REVIEW ALL MEDS BY PRESCRIBER/CLIN PHARMACIST DOCUMENTED: ICD-10-PCS | Mod: CPTII,95,, | Performed by: FAMILY MEDICINE

## 2022-01-24 PROCEDURE — 1160F RVW MEDS BY RX/DR IN RCRD: CPT | Mod: CPTII,95,, | Performed by: FAMILY MEDICINE

## 2022-01-24 PROCEDURE — 99214 PR OFFICE/OUTPT VISIT, EST, LEVL IV, 30-39 MIN: ICD-10-PCS | Mod: 95,,, | Performed by: FAMILY MEDICINE

## 2022-01-24 PROCEDURE — 99214 OFFICE O/P EST MOD 30 MIN: CPT | Mod: 95,,, | Performed by: FAMILY MEDICINE

## 2022-01-24 PROCEDURE — 3008F PR BODY MASS INDEX (BMI) DOCUMENTED: ICD-10-PCS | Mod: CPTII,95,, | Performed by: FAMILY MEDICINE

## 2022-01-24 PROCEDURE — 3008F BODY MASS INDEX DOCD: CPT | Mod: CPTII,95,, | Performed by: FAMILY MEDICINE

## 2022-01-24 PROCEDURE — 3077F SYST BP >= 140 MM HG: CPT | Mod: CPTII,95,, | Performed by: FAMILY MEDICINE

## 2022-01-24 PROCEDURE — 1159F MED LIST DOCD IN RCRD: CPT | Mod: CPTII,95,, | Performed by: FAMILY MEDICINE

## 2022-01-24 RX ORDER — VALSARTAN 80 MG/1
80 TABLET ORAL 2 TIMES DAILY
Qty: 60 TABLET | Refills: 1 | Status: SHIPPED | OUTPATIENT
Start: 2022-01-24 | End: 2022-02-22

## 2022-01-24 NOTE — PROGRESS NOTES
Subjective:       Patient ID: Brandie Richmond is a 61 y.o. female.    Chief Complaint: Hypertension      The patient location is: home, [place]  The chief complaint leading to consultation is: BP f/u  Visit type: Virtual visit with synchronous audio and video  Total time spent with patient: 15mins incl doc  Each patient to whom he or she provides medical services by telemedicine is:  (1) informed of the relationship between the physician and patient and the respective role of any other health care provider with respect to management of the patient; and (2) notified that he or she may decline to receive medical services by telemedicine and may withdraw from such care at any time.    Notes: Patient seen for BP f/u and review. She has been checking BP at home and despite adj still elevated.  dilt PM, valsartan AM  Today: 171/83 163/83, took at clonidine at that time.   Yesterday: 172/92 138/82  Currently, needing clonidine 4x/week.     Review of Systems   Constitutional: Negative for fatigue and unexpected weight change.   HENT: Negative for congestion and sore throat.    Respiratory: Negative for cough and shortness of breath.    Cardiovascular: Negative for chest pain, palpitations and leg swelling.   Musculoskeletal: Negative for neck pain.   Neurological: Positive for headaches. Negative for dizziness.   Psychiatric/Behavioral: Negative for sleep disturbance (sleeping ok, taking tylenol pm prn).       Objective:        Physical Exam  Vitals and nursing note reviewed.   Constitutional:       General: She is not in acute distress.     Appearance: Normal appearance. She is well-developed and well-nourished.   HENT:      Head: Normocephalic and atraumatic.   Eyes:      General: No scleral icterus.        Right eye: No discharge.         Left eye: No discharge.      Conjunctiva/sclera: Conjunctivae normal.   Pulmonary:      Effort: Pulmonary effort is normal. No respiratory distress.   Skin:     General: Skin is warm and dry.    Neurological:      General: No focal deficit present.      Mental Status: She is alert and oriented to person, place, and time.   Psychiatric:         Mood and Affect: Mood and affect and mood normal.         Behavior: Behavior normal.             Assessment:   Essential hypertension  Comments:  uncontrolled, still requiring clonidine ~4x/week  increase valsartan to BID, cont dilt  cont home BP log    Polymyalgia rheumatica  Comments:  stable, no recent exacerbation    Headache, new daily persistent ([facility])  Comments:  noticing some HA that correlate more strongly with elev BP readings  no new/alarm sx    Other orders  -     valsartan (DIOVAN) 80 MG tablet; Take 1 tablet (80 mg total) by mouth 2 (two) times daily.  Dispense: 60 tablet; Refill: 1         Patient aware of limitations to assessment and exam of telemedicine. Deemed appropriate at this time given current covid-19 concerns.     Answers for HPI/ROS submitted by the patient on 1/24/2022  Chronicity: chronic  Onset: more than 1 year ago  Progression since onset: waxing and waning  Condition status: uncontrolled  anxiety: Yes  blurred vision: No  malaise/fatigue: Yes  orthopnea: No  peripheral edema: No  PND: No  sweats: No  Agents associated with hypertension: NSAIDs, thyroid hormones  CAD risks: obesity, sedentary lifestyle, stress  Compliance problems: exercise  Past treatments: calcium channel blockers  Improvement on treatment: no improvement

## 2022-01-26 ENCOUNTER — OFFICE VISIT (OUTPATIENT)
Dept: OPTOMETRY | Facility: CLINIC | Age: 62
End: 2022-01-26
Payer: COMMERCIAL

## 2022-01-26 DIAGNOSIS — H52.4 MYOPIA WITH ASTIGMATISM AND PRESBYOPIA, BILATERAL: ICD-10-CM

## 2022-01-26 DIAGNOSIS — Z46.0 CONTACT LENS/GLASSES FITTING: Primary | ICD-10-CM

## 2022-01-26 DIAGNOSIS — H52.203 MYOPIA WITH ASTIGMATISM AND PRESBYOPIA, BILATERAL: ICD-10-CM

## 2022-01-26 DIAGNOSIS — H43.393 VITREOUS FLOATERS, BILATERAL: ICD-10-CM

## 2022-01-26 DIAGNOSIS — H52.13 MYOPIA WITH ASTIGMATISM AND PRESBYOPIA, BILATERAL: ICD-10-CM

## 2022-01-26 DIAGNOSIS — Z01.00 EXAMINATION OF EYES AND VISION: Primary | ICD-10-CM

## 2022-01-26 DIAGNOSIS — Z46.0 CONTACT LENS/GLASSES FITTING: ICD-10-CM

## 2022-01-26 PROBLEM — Z97.3 WEARS CONTACT LENSES: Status: ACTIVE | Noted: 2022-01-26

## 2022-01-26 PROCEDURE — 99999 PR PBB SHADOW E&M-EST. PATIENT-LVL III: ICD-10-PCS | Mod: PBBFAC,,, | Performed by: OPTOMETRIST

## 2022-01-26 PROCEDURE — 92015 PR REFRACTION: ICD-10-PCS | Mod: S$GLB,,, | Performed by: OPTOMETRIST

## 2022-01-26 PROCEDURE — 99499 UNLISTED E&M SERVICE: CPT | Mod: S$GLB,,, | Performed by: OPTOMETRIST

## 2022-01-26 PROCEDURE — 92025 PR CORNEAL TOPOGRAPHY: ICD-10-PCS | Mod: S$GLB,,, | Performed by: OPTOMETRIST

## 2022-01-26 PROCEDURE — 4010F PR ACE/ARB THEARPY RXD/TAKEN: ICD-10-PCS | Mod: CPTII,S$GLB,, | Performed by: OPTOMETRIST

## 2022-01-26 PROCEDURE — 92015 DETERMINE REFRACTIVE STATE: CPT | Mod: S$GLB,,, | Performed by: OPTOMETRIST

## 2022-01-26 PROCEDURE — 99499 NO LOS: ICD-10-PCS | Mod: S$GLB,,, | Performed by: OPTOMETRIST

## 2022-01-26 PROCEDURE — 92014 PR EYE EXAM, EST PATIENT,COMPREHESV: ICD-10-PCS | Mod: S$GLB,,, | Performed by: OPTOMETRIST

## 2022-01-26 PROCEDURE — 92025 CPTRIZED CORNEAL TOPOGRAPHY: CPT | Mod: S$GLB,,, | Performed by: OPTOMETRIST

## 2022-01-26 PROCEDURE — 99999 PR PBB SHADOW E&M-EST. PATIENT-LVL III: CPT | Mod: PBBFAC,,, | Performed by: OPTOMETRIST

## 2022-01-26 PROCEDURE — 92310 PR CONTACT LENS FITTING (NO CHANGE): ICD-10-PCS | Mod: CSM,,, | Performed by: OPTOMETRIST

## 2022-01-26 PROCEDURE — 92310 CONTACT LENS FITTING OU: CPT | Mod: CSM,,, | Performed by: OPTOMETRIST

## 2022-01-26 PROCEDURE — 92014 COMPRE OPH EXAM EST PT 1/>: CPT | Mod: S$GLB,,, | Performed by: OPTOMETRIST

## 2022-01-26 PROCEDURE — 4010F ACE/ARB THERAPY RXD/TAKEN: CPT | Mod: CPTII,S$GLB,, | Performed by: OPTOMETRIST

## 2022-01-26 NOTE — PROGRESS NOTES
HPI     Pt here for annual eye exam. LDE- 03/02/2020    Pt sts VA OU is stable. Pt needing updated rx for glasses and CL. Pt   denies flashes/floaters/pain. Pt denies use of gtt.     Agree above  Feels [facility] slightly changed for distance   Wants to consider refit into RGP's instead of scl's   Last wore RGP 4+ yrs ago          Last edited by LUIZ Mckeon, OD on 1/26/2022  9:22 AM. (History)        ROS     Positive for: Eyes    Negative for: Constitutional, Gastrointestinal, Neurological, Skin,   Genitourinary, Musculoskeletal, HENT, Endocrine, Cardiovascular,   Respiratory, Psychiatric, Allergic/Imm, Heme/Lymph    Last edited by LUIZ Mckeon, OD on 1/26/2022  8:55 AM. (History)        Assessment /Plan     For exam results, see Encounter Report.    Examination of eyes and vision    Myopia with astigmatism and presbyopia, bilateral    Contact lens/glasses fitting    Vitreous floaters, bilateral      1. Ocular health exam OU  2. Updated specs rx, gave copy, fill prn  3. Pt desires to return to RGP lenses--not as happy with vision w/ scl's  Discussed options: distance OU, monovision, MF rgp  K topography obtained today--scanned to chart    Will order as RGP monovision--clfu in office on dispense    4. Mild floaters OU  RD precautions given and reviewed. Patient knows to call/ message if any further changes in symptoms occur.      Discussed and educated patient on current findings /plan.  RTC 1 year, prn if any changes / issues

## 2022-01-26 NOTE — PATIENT INSTRUCTIONS
"DRY EYES -- BURNING OR CARLA SYMPTOMS:  Use Over The Counter artificial tears as needed for dry eye symptoms.   Some common brands include:  Systane, Optive, Refresh, and Thera-Tears.  These drops can be used as frequently as desired, but may be most helpful use during long periods of concentrated work.  For example, reading / working at the computer. Start with 3-4x per day.     Nighttime Ophthalmic gel or ointments are available: Refresh PM, Genteal, and Lacrilube.    Avoid drops that "get redness out" (Visine, Murine, Clear Eyes), as these may contain medication that could further irritate the eyes, especially with chronic use.    ALLERGY EYES -- ITCHING SYMPTOMS:  Over the counter medications include--Pataday, Zaditor, and Alaway.  Use as directed 1-2 drops daily for symptoms of itching / watering eyes.  These drops will not help for dry eye or exposure symptoms.    REDNESS RELIEF:  Lumify---is a good redness reliever that will not cause irritation if used chronically.         FLASHES / FLOATERS / POSTERIOR VITREOUS DETACHMENT    Call the clinic if you have any further changes in symptoms.  Including:  Increased numbers of floaters or flashing lights, dimness or darkness that moves through or stays constant in your vision, or any pain in the eye (s).    You may sometimes see small specks or clouds moving in your field of vision.  They are called FLOATERS.  You can often see them when looking at a plain background, like a blank wall or blue breanne.  Floaters are actually tiny clumps of gel or cells inside the VITREOUS, the clear jelly-like fluid that fills the inside of your eye.    While these objects look like they are in front of your eye, they are actually floating inside.  What you see are the shadows they cast on the RETINA, the nerve layer at the back of the eye that senses light and allows you to see.      POSTERIOR VITREOUS DETACHMENT    The appearance of new floaters may be alarming.  If you suddenly " develop new floaters, you should contact your eye care professional  right away.    The retina can tear if the shrinking vitreous pulls away from the wall of the eye.  This sometimes causes a small amount of bleeding in the eye that may appear as new floaters.    A torn retina is always a serious problem, since it can lead to a retinal detachment.  You should see your eye care professional as soon as possible if:     even one new floater appears suddenly;   you see sudden flashes of light;   you notice other symptoms, like the loss of side vision, or a curtain closes down in your vision        POSTERIOR VITREOUS DETACHMENT is more common for people who:     are nearsighted;   have had cataract surgery;   have had YAG laser surgery of the eye;   have had inflammation inside the eye;   are over age 60.      While some floaters may remain visible, many of them will fade over time and become less noticeable.  Even if you've had some floaters for years, you should have your eyes checked as soon as possible if you notice new ones.    FLASHING LIGHTS    When the vitreous gel rubs or pulls on the retina, you may see what look like flashing lights or lightning streaks.  These flashes can appear off and on for several weeks or months.      Some people experience flashes of light that appear as jagged lines or heat waves in both eyes, lasting 10-20 minutes.  These flashes are caused by a spasm of blood vessels in the brain, which is called a migraine.    If a headache follows these flashes, it's called a migraine headache.  If   no headache occurs, these flashes are called Ophthalmic or Ocular Migraine.

## 2022-02-04 ENCOUNTER — PATIENT MESSAGE (OUTPATIENT)
Dept: FAMILY MEDICINE | Facility: CLINIC | Age: 62
End: 2022-02-04
Payer: COMMERCIAL

## 2022-02-07 ENCOUNTER — OFFICE VISIT (OUTPATIENT)
Dept: OPTOMETRY | Facility: CLINIC | Age: 62
End: 2022-02-07
Payer: COMMERCIAL

## 2022-02-07 DIAGNOSIS — Z46.0 CONTACT LENS/GLASSES FITTING: Primary | ICD-10-CM

## 2022-02-07 PROCEDURE — 99499 UNLISTED E&M SERVICE: CPT | Mod: S$GLB,,, | Performed by: OPTOMETRIST

## 2022-02-07 PROCEDURE — 99499 NO LOS: ICD-10-PCS | Mod: S$GLB,,, | Performed by: OPTOMETRIST

## 2022-02-08 NOTE — TELEPHONE ENCOUNTER
No new care gaps identified.  Powered by [employer]. Reference number: [id number].   2/08/2022 3:07:04 PM CST

## 2022-02-09 RX ORDER — DILTIAZEM HYDROCHLORIDE 240 MG/1
240 CAPSULE, EXTENDED RELEASE ORAL DAILY
Qty: 90 CAPSULE | Refills: 0 | Status: SHIPPED | OUTPATIENT
Start: 2022-02-09 | End: 2022-03-22 | Stop reason: SDUPTHER

## 2022-02-09 NOTE — TELEPHONE ENCOUNTER
I refilled the diltiazem. Based on BP readings, the trend is a little better, but definitely still room for improvement. Would she be ok with starting a low dose diuretic. I noticed she had dizziness with hctz, so we could try chlorthalidone as an alternative.

## 2022-02-10 ENCOUNTER — PATIENT MESSAGE (OUTPATIENT)
Dept: FAMILY MEDICINE | Facility: CLINIC | Age: 62
End: 2022-02-10
Payer: COMMERCIAL

## 2022-02-10 RX ORDER — CHLORTHALIDONE 25 MG/1
25 TABLET ORAL DAILY
Qty: 30 TABLET | Refills: 11 | Status: SHIPPED | OUTPATIENT
Start: 2022-02-10 | End: 2023-02-10

## 2022-02-10 NOTE — TELEPHONE ENCOUNTER
Called pt, Pt is ok with trying the alternative diuretic. Pt wants to know if she still needs to come in for follow up on Monday or if she can hold off for a few more weeks?

## 2022-02-22 RX ORDER — VALSARTAN 80 MG/1
TABLET ORAL
Qty: 180 TABLET | Refills: 1 | Status: SHIPPED | OUTPATIENT
Start: 2022-02-22 | End: 2022-09-15

## 2022-02-22 NOTE — TELEPHONE ENCOUNTER
This Rx Request does not qualify for assessment with the OR.    Please review protocol details and the Care Due Message for extra clinical information    Reasons Rx Request may be deferred:  1. Labs/Vitals overdue  2. Labs/Vitals abnormal  3. Patient has been seen in the ED/Hospital since the last PCP visit  4. Medication is non-delegated  5. Provider is a non-participating provider     I have personally performed a face to face diagnostic evaluation on this patient. I have reviewed the ACP note and agree with the history, exam and plan of care, except as noted.

## 2022-02-22 NOTE — TELEPHONE ENCOUNTER
No new care gaps identified.  Powered by [employer]. Reference number: [id number].   2/21/2022 7:48:09 PM CST

## 2022-03-13 DIAGNOSIS — E03.9 HYPOTHYROIDISM: ICD-10-CM

## 2022-03-13 DIAGNOSIS — E03.9 HYPOTHYROIDISM, UNSPECIFIED TYPE: ICD-10-CM

## 2022-03-13 NOTE — TELEPHONE ENCOUNTER
No new care gaps identified.  Powered by [employer]. Reference number: [id number].   3/13/2022 6:56:10 PM CDT

## 2022-03-15 DIAGNOSIS — E03.9 HYPOTHYROIDISM: ICD-10-CM

## 2022-03-15 NOTE — TELEPHONE ENCOUNTER
No new care gaps identified.  Powered by [employer]. Reference number: [id number].   3/15/2022 5:36:49 PM CDT

## 2022-03-21 RX ORDER — LEVOTHYROXINE SODIUM 75 UG/1
TABLET ORAL
Qty: 24 TABLET | Refills: 2 | Status: SHIPPED | OUTPATIENT
Start: 2022-03-21 | End: 2022-03-22 | Stop reason: SDUPTHER

## 2022-03-21 RX ORDER — LEVOTHYROXINE SODIUM 50 UG/1
TABLET ORAL
Qty: 30 TABLET | Refills: 11 | OUTPATIENT
Start: 2022-03-21

## 2022-03-21 RX ORDER — LEVOTHYROXINE SODIUM 50 UG/1
TABLET ORAL
Qty: 60 TABLET | Refills: 2 | Status: SHIPPED | OUTPATIENT
Start: 2022-03-21 | End: 2022-03-22 | Stop reason: SDUPTHER

## 2022-03-22 ENCOUNTER — PATIENT MESSAGE (OUTPATIENT)
Dept: FAMILY MEDICINE | Facility: CLINIC | Age: 62
End: 2022-03-22
Payer: COMMERCIAL

## 2022-03-22 DIAGNOSIS — E03.9 HYPOTHYROIDISM: ICD-10-CM

## 2022-03-22 DIAGNOSIS — E03.9 HYPOTHYROIDISM, UNSPECIFIED TYPE: ICD-10-CM

## 2022-03-22 RX ORDER — DILTIAZEM HYDROCHLORIDE 240 MG/1
240 CAPSULE, EXTENDED RELEASE ORAL DAILY
Qty: 90 CAPSULE | Refills: 2 | Status: SHIPPED | OUTPATIENT
Start: 2022-03-22 | End: 2023-03-22

## 2022-03-22 RX ORDER — LEVOTHYROXINE SODIUM 50 UG/1
TABLET ORAL
Qty: 90 TABLET | Refills: 2 | Status: SHIPPED | OUTPATIENT
Start: 2022-03-22

## 2022-03-22 RX ORDER — LEVOTHYROXINE SODIUM 75 UG/1
TABLET ORAL
Qty: 90 TABLET | Refills: 2 | Status: SHIPPED | OUTPATIENT
Start: 2022-03-22

## 2022-03-22 NOTE — TELEPHONE ENCOUNTER
Refill Authorization Note   Brandie Richmond  is requesting a refill authorization.  Brief Assessment and Rationale for Refill:  Approve     Medication Therapy Plan:       Medication Reconciliation Completed: No   Comments:   --->Care Gap information included below if applicable.   Orders Placed This Encounter    SYNTHROID 50 mcg tablet    SYNTHROID 75 mcg tablet      Requested Prescriptions   Signed Prescriptions Disp Refills    SYNTHROID 50 mcg tablet 60 tablet 2     Sig: TAKE 1 TABLET BY MOUTH EVERY MONDAY TUESDAY THURSDAY FRIDAY AND SUNDAY EVERY WEEK       Endocrinology:  Hypothyroid Agents Passed - 3/13/2022  6:56 PM        Passed - Patient is at least 18 years old        Passed - Valid encounter within last 15 months     Recent Visits  Date Type Provider Dept   01/24/22 Office Visit Amie Dupree MD [facility] Family Medicine   11/08/21 Office Visit Amie Dupree MD [facility] Family Medicine   06/29/21 Office Visit Amie Dupree MD [facility] Family Medicine   Showing recent visits within past 720 days and meeting all other requirements  Future Appointments  No visits were found meeting these conditions.  Showing future appointments within next 150 days and meeting all other requirements      Future Appointments              In 2 months MD Osmel Garcíaington - Cardiology, [place]    In 10 months MD Charline Latif - Nephrology, [facility]                Passed - Manual Review: FT4 is not required if last TSH is WNL.        Passed - TSH in normal range and within 360 days     TSH   Date Value Ref Range Status   11/16/2021 1.46 0.40 - 4.50 mIU/L Final   05/27/2021 1.107 0.400 - 4.000 uIU/mL Final   01/15/2021 1.01 0.40 - 4.50 mIU/L Final              Passed - T4 free within 1080 days     Free T4   Date Value Ref Range Status   05/25/2019 1.03 0.71 - 1.51 ng/dL Final                SYNTHROID 75 mcg tablet 24 tablet 2     Sig: TAKE 1 TABLET BY MOUTH TWICE WEEKLY       Endocrinology:  Hypothyroid Agents  Passed - 3/13/2022  6:56 PM        Passed - Patient is at least 18 years old        Passed - Valid encounter within last 15 months     Recent Visits  Date Type Provider Dept   01/24/22 Office Visit Amie Dupree MD [facility] Family Medicine   11/08/21 Office Visit Amie Dupree MD [facility] Family Medicine   06/29/21 Office Visit Amie Dupree MD [facility] Family Medicine   Showing recent visits within past 720 days and meeting all other requirements  Future Appointments  No visits were found meeting these conditions.  Showing future appointments within next 150 days and meeting all other requirements      Future Appointments              In 2 months MD Charline García - Cardiology, [place]    In 10 months MD Charline Latif - Nephrology, [facility]                Passed - Manual Review: FT4 is not required if last TSH is WNL.        Passed - TSH in normal range and within 360 days     TSH   Date Value Ref Range Status   11/16/2021 1.46 0.40 - 4.50 mIU/L Final   05/27/2021 1.107 0.400 - 4.000 uIU/mL Final   01/15/2021 1.01 0.40 - 4.50 mIU/L Final              Passed - T4 free within 1080 days     Free T4   Date Value Ref Range Status   05/25/2019 1.03 0.71 - 1.51 ng/dL Final                  Appointments  past 12m or future 3m with PCP    Date Provider   Last Visit   1/24/2022 Amie Dupree MD   Next Visit    Amie Dupree MD   ED visits in past 90 days: 0     Note composed:10:34 PM 03/21/2022

## 2022-03-22 NOTE — TELEPHONE ENCOUNTER
Meggan DC. Request already responded to by other means (e.g. phone or fax)   Refill Authorization Note   Brandie Richmond  is requesting a refill authorization.  Brief Assessment and Rationale for Refill:  Quick Discontinue  Medication Therapy Plan:       Medication Reconciliation Completed:  No      Comments:   Pended Medication(s)       Requested Prescriptions     Refused Prescriptions Disp Refills    SYNTHROID 50 mcg tablet [Pharmacy Med Name: SYNTHROID 0.05MG (50MCG)TABLETS] 30 tablet 11     Sig: TAKE 1 TABLET BY MOUTH EVERY MONDAY TUESDAY THURSDAY FRIDAY AND SUNDAY EVERY WEEK     Refused By: BRADY TOURE     Reason for Refusal: Duplicate        Duplicate Pended Encounter(s)/ Last Prescribed Details: (includes pharmacy & prescriber details)   Written Date: 03/21/22 Expiration Date: 03/21/23       Diagnosis Association: Hypothyroidism (E03.9)   Original Order:  SYNTHROID 50 mcg tablet [[id number]]     Providers    Authorizing Provider:    Amie Dupree MD   [address] [facility] [address]   Phone:  [phone]   Fax:  [phone]   SILVESTRE #:  [id number]   NPI:  [id number]        Ordering User:  Brady Toure    Cosigner:  Amie Dupree MD Signed:  --     Pharmacy    [id number] [address] [place] [address]   [address] [place] [id number]   Phone:  [phone]  Fax:  [phone]   SILVESTRE #:  [id number]   SEAN Reason: --       Outpatient Medication Detail     Disp Refills Start End SEAN   SYNTHROID 50 mcg tablet 60 tablet 2 3/21/2022  Yes   Sig: TAKE 1 TABLET BY MOUTH EVERY MONDAY TUESDAY THURSDAY FRIDAY AND SUNDAY EVERY WEEK   Sent to pharmacy as: SYNTHROID 50 mcg tablet   Class: Normal   Order: [id number]   Cosign for Ordering: Required by Amie Dupree MD   Date/Time Signed: 3/21/2022 22:34       E-Prescribing Status: Receipt confirmed by pharmacy (3/21/2022 10:34 PM CDT)        Note composed:10:36 PM 03/21/2022

## 2022-03-22 NOTE — TELEPHONE ENCOUNTER
No new care gaps identified.  Powered by [employer]. Reference number: [id number].   3/22/2022 7:34:53 AM CDT

## 2022-05-02 ENCOUNTER — TELEPHONE (OUTPATIENT)
Dept: CARDIOLOGY | Facility: CLINIC | Age: 62
End: 2022-05-02
Payer: COMMERCIAL

## 2022-05-02 NOTE — TELEPHONE ENCOUNTER
Spoke with patient cancel appointment patient wanted to know if you have any recommendations for cardio in [place]?

## 2022-05-31 ENCOUNTER — PATIENT MESSAGE (OUTPATIENT)
Dept: ADMINISTRATIVE | Facility: HOSPITAL | Age: 62
End: 2022-05-31
Payer: COMMERCIAL

## 2022-07-08 DIAGNOSIS — I10 ESSENTIAL HYPERTENSION: ICD-10-CM

## 2022-07-08 RX ORDER — DILTIAZEM HYDROCHLORIDE 180 MG/1
CAPSULE, COATED, EXTENDED RELEASE ORAL
Qty: 90 CAPSULE | Refills: 4 | Status: SHIPPED | OUTPATIENT
Start: 2022-07-08

## 2022-07-29 ENCOUNTER — PATIENT MESSAGE (OUTPATIENT)
Dept: ADMINISTRATIVE | Facility: HOSPITAL | Age: 62
End: 2022-07-29
Payer: COMMERCIAL

## 2022-08-11 NOTE — TELEPHONE ENCOUNTER
Called pt to inform [employer] products are in stock. Pt states she is in [place] today and will stop by to purchase.    no

## 2022-12-01 ENCOUNTER — PATIENT MESSAGE (OUTPATIENT)
Dept: NEPHROLOGY | Facility: CLINIC | Age: 62
End: 2022-12-01
Payer: COMMERCIAL

## 2023-08-22 NOTE — TELEPHONE ENCOUNTER
Called diltiazem 180mg to [facility], sent prescription  for dr traylor to sign   (1) More than 48 hours/None

## (undated) DEVICE — SUT 3-0 12-18IN SILK

## (undated) DEVICE — DRAPE THYROID WITH ARMBOARD

## (undated) DEVICE — SOL 9P NACL IRR PIC IL

## (undated) DEVICE — ELECTRODE REM PLYHSV RETURN 9

## (undated) DEVICE — DRESSING TELFA N ADH 3X8

## (undated) DEVICE — ELECTRODE BLD EXT INSUL 1

## (undated) DEVICE — SUT SILK 3-0 SH 18IN BLACK

## (undated) DEVICE — ADHESIVE DERMABOND ADVANCED

## (undated) DEVICE — SUT 2-0 12-18IN SILK

## (undated) DEVICE — SUT VICRYL PLUS 3-0 18IN

## (undated) DEVICE — GLOVE BIOGEL SKINSENSE PI 8.0

## (undated) DEVICE — SEE MEDLINE ITEM 152622

## (undated) DEVICE — Device

## (undated) DEVICE — SYR B-D DISP CONTROL 10CC100/C

## (undated) DEVICE — NDL HYPO REG 25G X 1 1/2

## (undated) DEVICE — GOWN SMART IMP BREATHABLE XXLG

## (undated) DEVICE — SEE MEDLINE ITEM 156930

## (undated) DEVICE — CLOSURE SKIN STERI STRIP 1/2X4